# Patient Record
Sex: FEMALE | Race: WHITE | Employment: UNEMPLOYED | URBAN - METROPOLITAN AREA
[De-identification: names, ages, dates, MRNs, and addresses within clinical notes are randomized per-mention and may not be internally consistent; named-entity substitution may affect disease eponyms.]

---

## 2017-12-20 PROBLEM — K21.9 GASTROESOPHAGEAL REFLUX DISEASE WITHOUT ESOPHAGITIS: Status: ACTIVE | Noted: 2017-12-20

## 2017-12-20 PROBLEM — I10 ESSENTIAL HYPERTENSION: Status: ACTIVE | Noted: 2017-12-20

## 2017-12-20 PROBLEM — E11.9 CONTROLLED TYPE 2 DIABETES MELLITUS WITHOUT COMPLICATION, WITHOUT LONG-TERM CURRENT USE OF INSULIN (HCC): Status: ACTIVE | Noted: 2017-12-20

## 2017-12-20 PROBLEM — G37.3 TRANSVERSE MYELITIS (HCC): Status: ACTIVE | Noted: 2017-12-20

## 2018-02-21 PROBLEM — I49.8 SINUS ARRHYTHMIA SEEN ON ELECTROCARDIOGRAM: Status: ACTIVE | Noted: 2018-02-21

## 2018-02-21 PROBLEM — I89.0 LYMPHEDEMA: Status: ACTIVE | Noted: 2018-02-21

## 2018-09-20 PROBLEM — I48.20 CHRONIC ATRIAL FIBRILLATION (HCC): Status: ACTIVE | Noted: 2018-09-20

## 2018-12-14 ENCOUNTER — TELEPHONE (OUTPATIENT)
Dept: PRIMARY CARE CLINIC | Age: 63
End: 2018-12-14

## 2018-12-14 NOTE — TELEPHONE ENCOUNTER
Margy Rush received my message. See original notes taken for this call. This diagnosis was already listed in her problem list and didn't need routed to Dr. Jeff Goins. Margy Beverly then states that was not the reason for the call. Margy Rush reports that [employer] denying patient's Lymphedema pump and/or boot. This would now require a peer to peer. Otherwise a new script with dx: severe Lymphedema along with clinical notes/office note that Lymphedema evaluation, clear details in sections of note (objective and subjective) with findings-severe edema. This info and office note must be submitted in order for insurance to cover the DME. Called patient and notified, scheduled appt.

## 2018-12-14 NOTE — TELEPHONE ENCOUNTER
Left detailed message on secure voicemail for [place] with update and confirmation of patient diagnosis.

## 2018-12-31 LAB
A/G RATIO: NORMAL
ALBUMIN SERPL-MCNC: NORMAL G/DL
ALP BLD-CCNC: NORMAL U/L
ALT SERPL-CCNC: NORMAL U/L
ANION GAP SERPL CALCULATED.3IONS-SCNC: NORMAL MMOL/L
AST SERPL-CCNC: NORMAL U/L
AVERAGE GLUCOSE: NORMAL
BASOPHILS ABSOLUTE: NORMAL /ΜL
BASOPHILS RELATIVE PERCENT: NORMAL %
BILIRUB SERPL-MCNC: NORMAL MG/DL (ref 0.1–1.4)
BILIRUBIN DIRECT: NORMAL MG/DL
BILIRUBIN, INDIRECT: NORMAL
BUN BLDV-MCNC: NORMAL MG/DL
BUN/CREAT BLD: NORMAL
CALCIUM SERPL-MCNC: NORMAL MG/DL
CHLORIDE BLD-SCNC: NORMAL MMOL/L
CHOLESTEROL, FASTING: 148
CO2: NORMAL MMOL/L
CREAT SERPL-MCNC: NORMAL MG/DL
EOSINOPHILS ABSOLUTE: NORMAL /ΜL
EOSINOPHILS RELATIVE PERCENT: NORMAL %
GFR AFRICAN AMERICAN: NORMAL
GFR NON-AFRICAN AMERICAN: NORMAL
GLOBULIN: NORMAL
GLUCOSE FASTING: NORMAL MG/DL
HBA1C MFR BLD: 5.5 %
HCT VFR BLD CALC: NORMAL % (ref 36–46)
HDLC SERPL-MCNC: 45 MG/DL (ref 35–70)
HEMOGLOBIN: NORMAL G/DL (ref 12–16)
LDL CHOLESTEROL CALCULATED: 75 MG/DL (ref 0–160)
LYMPHOCYTES ABSOLUTE: NORMAL /ΜL
LYMPHOCYTES RELATIVE PERCENT: NORMAL %
MCH RBC QN AUTO: NORMAL PG
MCHC RBC AUTO-ENTMCNC: NORMAL G/DL
MCV RBC AUTO: NORMAL FL
MONOCYTES ABSOLUTE: NORMAL /ΜL
MONOCYTES RELATIVE PERCENT: NORMAL %
NEUTROPHILS ABSOLUTE: NORMAL /ΜL
NEUTROPHILS RELATIVE PERCENT: NORMAL %
PDW BLD-RTO: NORMAL %
PLATELET # BLD: NORMAL K/ΜL
PMV BLD AUTO: NORMAL FL
POTASSIUM SERPL-SCNC: NORMAL MMOL/L
PROTEIN TOTAL: NORMAL G/DL
RBC # BLD: NORMAL 10^6/ΜL
SODIUM BLD-SCNC: NORMAL MMOL/L
T4 FREE: NORMAL
TRIGLYCERIDE, FASTING: 139
TSH SERPL DL<=0.05 MIU/L-ACNC: NORMAL UIU/ML
WBC # BLD: NORMAL 10^3/ML

## 2019-01-02 DIAGNOSIS — E11.9 CONTROLLED TYPE 2 DIABETES MELLITUS WITHOUT COMPLICATION, WITHOUT LONG-TERM CURRENT USE OF INSULIN (HCC): ICD-10-CM

## 2019-01-03 DIAGNOSIS — R53.83 FATIGUE, UNSPECIFIED TYPE: ICD-10-CM

## 2019-01-03 DIAGNOSIS — E11.9 CONTROLLED TYPE 2 DIABETES MELLITUS WITHOUT COMPLICATION, WITHOUT LONG-TERM CURRENT USE OF INSULIN (HCC): ICD-10-CM

## 2019-01-03 PROCEDURE — 36415 COLL VENOUS BLD VENIPUNCTURE: CPT | Performed by: FAMILY MEDICINE

## 2019-01-07 ENCOUNTER — OFFICE VISIT (OUTPATIENT)
Dept: PRIMARY CARE CLINIC | Age: 64
End: 2019-01-07
Payer: COMMERCIAL

## 2019-01-07 VITALS — DIASTOLIC BLOOD PRESSURE: 76 MMHG | SYSTOLIC BLOOD PRESSURE: 126 MMHG | OXYGEN SATURATION: 95 % | HEART RATE: 62 BPM

## 2019-01-07 DIAGNOSIS — R10.2 PELVIC PAIN: ICD-10-CM

## 2019-01-07 DIAGNOSIS — I49.9 IRREGULAR HEART RATE: ICD-10-CM

## 2019-01-07 DIAGNOSIS — M54.50 ACUTE MIDLINE LOW BACK PAIN WITHOUT SCIATICA: Primary | ICD-10-CM

## 2019-01-07 DIAGNOSIS — I89.0 LYMPHEDEMA: ICD-10-CM

## 2019-01-07 PROCEDURE — G8421 BMI NOT CALCULATED: HCPCS | Performed by: FAMILY MEDICINE

## 2019-01-07 PROCEDURE — 1036F TOBACCO NON-USER: CPT | Performed by: FAMILY MEDICINE

## 2019-01-07 PROCEDURE — 99214 OFFICE O/P EST MOD 30 MIN: CPT | Performed by: FAMILY MEDICINE

## 2019-01-07 PROCEDURE — G8482 FLU IMMUNIZE ORDER/ADMIN: HCPCS | Performed by: FAMILY MEDICINE

## 2019-01-07 PROCEDURE — 93000 ELECTROCARDIOGRAM COMPLETE: CPT | Performed by: FAMILY MEDICINE

## 2019-01-07 PROCEDURE — G8427 DOCREV CUR MEDS BY ELIG CLIN: HCPCS | Performed by: FAMILY MEDICINE

## 2019-01-07 PROCEDURE — 3017F COLORECTAL CA SCREEN DOC REV: CPT | Performed by: FAMILY MEDICINE

## 2019-01-07 ASSESSMENT — ENCOUNTER SYMPTOMS
EYE DISCHARGE: 0
BACK PAIN: 1
RHINORRHEA: 0
SHORTNESS OF BREATH: 0
DIARRHEA: 0
COUGH: 0
SORE THROAT: 0
WHEEZING: 0
EYE REDNESS: 0
NAUSEA: 0
VOMITING: 0
ABDOMINAL PAIN: 0

## 2019-01-16 DIAGNOSIS — R10.2 PELVIC PAIN: ICD-10-CM

## 2019-01-18 DIAGNOSIS — R79.89 ABNORMAL THYROID BLOOD TEST: ICD-10-CM

## 2019-01-18 DIAGNOSIS — R53.83 FATIGUE, UNSPECIFIED TYPE: Primary | ICD-10-CM

## 2019-01-21 DIAGNOSIS — M54.50 ACUTE MIDLINE LOW BACK PAIN WITHOUT SCIATICA: ICD-10-CM

## 2019-01-28 DIAGNOSIS — K21.9 GASTROESOPHAGEAL REFLUX DISEASE WITHOUT ESOPHAGITIS: ICD-10-CM

## 2019-01-28 RX ORDER — DULOXETIN HYDROCHLORIDE 60 MG/1
CAPSULE, DELAYED RELEASE ORAL
Qty: 28 CAPSULE | Refills: 2 | Status: SHIPPED | OUTPATIENT
Start: 2019-01-28 | End: 2019-04-30 | Stop reason: SDUPTHER

## 2019-01-28 RX ORDER — PYRIDOSTIGMINE BROMIDE 60 MG/1
TABLET ORAL
Qty: 28 TABLET | Refills: 2 | Status: SHIPPED | OUTPATIENT
Start: 2019-01-28 | End: 2019-04-30 | Stop reason: SDUPTHER

## 2019-01-28 RX ORDER — FUROSEMIDE 40 MG/1
TABLET ORAL
Qty: 28 TABLET | Refills: 2 | Status: SHIPPED | OUTPATIENT
Start: 2019-01-28 | End: 2019-04-30 | Stop reason: SDUPTHER

## 2019-01-28 RX ORDER — OMEPRAZOLE 20 MG/1
CAPSULE, DELAYED RELEASE ORAL
Qty: 28 CAPSULE | Refills: 2 | Status: SHIPPED | OUTPATIENT
Start: 2019-01-28 | End: 2019-04-30 | Stop reason: SDUPTHER

## 2019-01-28 RX ORDER — RIVAROXABAN 20 MG/1
TABLET, FILM COATED ORAL
Qty: 28 TABLET | Refills: 2 | Status: SHIPPED | OUTPATIENT
Start: 2019-01-28 | End: 2019-04-30 | Stop reason: SDUPTHER

## 2019-01-28 RX ORDER — POTASSIUM CHLORIDE 750 MG/1
TABLET, FILM COATED, EXTENDED RELEASE ORAL
Qty: 28 TABLET | Refills: 2 | Status: SHIPPED | OUTPATIENT
Start: 2019-01-28 | End: 2019-04-30 | Stop reason: SDUPTHER

## 2019-01-28 RX ORDER — OXYBUTYNIN CHLORIDE 5 MG/1
TABLET ORAL
Qty: 56 TABLET | Refills: 2 | Status: SHIPPED | OUTPATIENT
Start: 2019-01-28 | End: 2019-04-30 | Stop reason: SDUPTHER

## 2019-02-25 DIAGNOSIS — I10 ESSENTIAL HYPERTENSION: ICD-10-CM

## 2019-02-25 DIAGNOSIS — E11.9 CONTROLLED TYPE 2 DIABETES MELLITUS WITHOUT COMPLICATION, WITHOUT LONG-TERM CURRENT USE OF INSULIN (HCC): ICD-10-CM

## 2019-02-27 RX ORDER — ATORVASTATIN CALCIUM 20 MG/1
TABLET, FILM COATED ORAL
Qty: 28 TABLET | Refills: 0 | Status: SHIPPED | OUTPATIENT
Start: 2019-02-27 | End: 2019-04-02 | Stop reason: SDUPTHER

## 2019-02-27 RX ORDER — LOSARTAN POTASSIUM 25 MG/1
TABLET ORAL
Qty: 28 TABLET | Refills: 0 | Status: SHIPPED | OUTPATIENT
Start: 2019-02-27 | End: 2019-04-02 | Stop reason: SDUPTHER

## 2019-03-01 ENCOUNTER — TELEPHONE (OUTPATIENT)
Dept: PRIMARY CARE CLINIC | Age: 64
End: 2019-03-01

## 2019-03-07 DIAGNOSIS — G37.3 TRANSVERSE MYELITIS (HCC): ICD-10-CM

## 2019-03-08 RX ORDER — OXYCODONE HYDROCHLORIDE AND ACETAMINOPHEN 5; 325 MG/1; MG/1
1 TABLET ORAL EVERY 6 HOURS PRN
Qty: 60 TABLET | Refills: 0 | Status: SHIPPED | OUTPATIENT
Start: 2019-03-08 | End: 2019-04-24 | Stop reason: SDUPTHER

## 2019-03-08 RX ORDER — POLYETHYLENE GLYCOL 3350 17 G/17G
17 POWDER, FOR SOLUTION ORAL DAILY
Qty: 1530 G | Refills: 1 | Status: SHIPPED | OUTPATIENT
Start: 2019-03-08 | End: 2019-04-07

## 2019-03-27 ENCOUNTER — OFFICE VISIT (OUTPATIENT)
Dept: PRIMARY CARE CLINIC | Age: 64
End: 2019-03-27
Payer: COMMERCIAL

## 2019-03-27 VITALS — OXYGEN SATURATION: 97 % | DIASTOLIC BLOOD PRESSURE: 86 MMHG | HEART RATE: 57 BPM | SYSTOLIC BLOOD PRESSURE: 132 MMHG

## 2019-03-27 DIAGNOSIS — I48.20 CHRONIC ATRIAL FIBRILLATION (HCC): ICD-10-CM

## 2019-03-27 DIAGNOSIS — I89.0 LYMPHEDEMA: ICD-10-CM

## 2019-03-27 DIAGNOSIS — Z12.31 SCREENING MAMMOGRAM, ENCOUNTER FOR: ICD-10-CM

## 2019-03-27 DIAGNOSIS — E11.9 CONTROLLED TYPE 2 DIABETES MELLITUS WITHOUT COMPLICATION, WITHOUT LONG-TERM CURRENT USE OF INSULIN (HCC): Primary | ICD-10-CM

## 2019-03-27 PROCEDURE — 99214 OFFICE O/P EST MOD 30 MIN: CPT | Performed by: FAMILY MEDICINE

## 2019-03-27 PROCEDURE — 1036F TOBACCO NON-USER: CPT | Performed by: FAMILY MEDICINE

## 2019-03-27 PROCEDURE — 2022F DILAT RTA XM EVC RTNOPTHY: CPT | Performed by: FAMILY MEDICINE

## 2019-03-27 PROCEDURE — G8421 BMI NOT CALCULATED: HCPCS | Performed by: FAMILY MEDICINE

## 2019-03-27 PROCEDURE — G8427 DOCREV CUR MEDS BY ELIG CLIN: HCPCS | Performed by: FAMILY MEDICINE

## 2019-03-27 PROCEDURE — G8482 FLU IMMUNIZE ORDER/ADMIN: HCPCS | Performed by: FAMILY MEDICINE

## 2019-03-27 PROCEDURE — 3017F COLORECTAL CA SCREEN DOC REV: CPT | Performed by: FAMILY MEDICINE

## 2019-03-27 PROCEDURE — 3046F HEMOGLOBIN A1C LEVEL >9.0%: CPT | Performed by: FAMILY MEDICINE

## 2019-03-27 ASSESSMENT — ENCOUNTER SYMPTOMS
VOMITING: 0
NAUSEA: 0
RHINORRHEA: 0
EYE DISCHARGE: 0
COUGH: 0
SORE THROAT: 0
EYE REDNESS: 0
ABDOMINAL PAIN: 0
WHEEZING: 0
DIARRHEA: 0
SHORTNESS OF BREATH: 0

## 2019-03-27 ASSESSMENT — PATIENT HEALTH QUESTIONNAIRE - PHQ9
SUM OF ALL RESPONSES TO PHQ QUESTIONS 1-9: 0
SUM OF ALL RESPONSES TO PHQ QUESTIONS 1-9: 0
SUM OF ALL RESPONSES TO PHQ9 QUESTIONS 1 & 2: 0
1. LITTLE INTEREST OR PLEASURE IN DOING THINGS: 0
2. FEELING DOWN, DEPRESSED OR HOPELESS: 0

## 2019-04-02 DIAGNOSIS — E11.9 CONTROLLED TYPE 2 DIABETES MELLITUS WITHOUT COMPLICATION, WITHOUT LONG-TERM CURRENT USE OF INSULIN (HCC): ICD-10-CM

## 2019-04-02 DIAGNOSIS — I10 ESSENTIAL HYPERTENSION: ICD-10-CM

## 2019-04-02 RX ORDER — ATORVASTATIN CALCIUM 20 MG/1
TABLET, FILM COATED ORAL
Qty: 28 TABLET | Refills: 0 | Status: SHIPPED | OUTPATIENT
Start: 2019-04-02 | End: 2019-04-30 | Stop reason: SDUPTHER

## 2019-04-02 RX ORDER — LOSARTAN POTASSIUM 25 MG/1
TABLET ORAL
Qty: 28 TABLET | Refills: 0 | Status: SHIPPED | OUTPATIENT
Start: 2019-04-02 | End: 2019-04-30 | Stop reason: SDUPTHER

## 2019-04-04 ENCOUNTER — HOSPITAL ENCOUNTER (OUTPATIENT)
Dept: MAMMOGRAPHY | Age: 64
Discharge: HOME | End: 2019-04-06
Payer: COMMERCIAL

## 2019-04-04 DIAGNOSIS — Z12.31 SCREENING MAMMOGRAM, ENCOUNTER FOR: ICD-10-CM

## 2019-04-04 PROCEDURE — 77063 BREAST TOMOSYNTHESIS BI: CPT

## 2019-04-24 ENCOUNTER — TELEPHONE (OUTPATIENT)
Dept: PRIMARY CARE CLINIC | Age: 64
End: 2019-04-24

## 2019-04-24 DIAGNOSIS — G37.3 TRANSVERSE MYELITIS (HCC): ICD-10-CM

## 2019-04-24 RX ORDER — OXYCODONE HYDROCHLORIDE AND ACETAMINOPHEN 5; 325 MG/1; MG/1
1 TABLET ORAL EVERY 6 HOURS PRN
Qty: 60 TABLET | Refills: 0 | Status: SHIPPED | OUTPATIENT
Start: 2019-04-24 | End: 2019-08-08 | Stop reason: SDUPTHER

## 2019-04-30 DIAGNOSIS — I10 ESSENTIAL HYPERTENSION: ICD-10-CM

## 2019-04-30 DIAGNOSIS — K21.9 GASTROESOPHAGEAL REFLUX DISEASE WITHOUT ESOPHAGITIS: ICD-10-CM

## 2019-04-30 DIAGNOSIS — E11.9 CONTROLLED TYPE 2 DIABETES MELLITUS WITHOUT COMPLICATION, WITHOUT LONG-TERM CURRENT USE OF INSULIN (HCC): ICD-10-CM

## 2019-04-30 RX ORDER — OXYBUTYNIN CHLORIDE 5 MG/1
TABLET ORAL
Qty: 56 TABLET | Refills: 0 | Status: SHIPPED | OUTPATIENT
Start: 2019-04-30 | End: 2019-06-28 | Stop reason: SDUPTHER

## 2019-04-30 RX ORDER — ATORVASTATIN CALCIUM 20 MG/1
TABLET, FILM COATED ORAL
Qty: 28 TABLET | Refills: 0 | Status: SHIPPED | OUTPATIENT
Start: 2019-04-30 | End: 2019-06-28 | Stop reason: SDUPTHER

## 2019-04-30 RX ORDER — LOSARTAN POTASSIUM 25 MG/1
TABLET ORAL
Qty: 28 TABLET | Refills: 0 | Status: SHIPPED | OUTPATIENT
Start: 2019-04-30 | End: 2019-06-28 | Stop reason: SDUPTHER

## 2019-04-30 RX ORDER — RIVAROXABAN 20 MG/1
TABLET, FILM COATED ORAL
Qty: 28 TABLET | Refills: 0 | Status: SHIPPED | OUTPATIENT
Start: 2019-04-30 | End: 2019-06-28 | Stop reason: SDUPTHER

## 2019-04-30 RX ORDER — OMEPRAZOLE 20 MG/1
CAPSULE, DELAYED RELEASE ORAL
Qty: 28 CAPSULE | Refills: 0 | Status: SHIPPED | OUTPATIENT
Start: 2019-04-30 | End: 2019-06-28 | Stop reason: SDUPTHER

## 2019-04-30 RX ORDER — POTASSIUM CHLORIDE 750 MG/1
TABLET, FILM COATED, EXTENDED RELEASE ORAL
Qty: 28 TABLET | Refills: 0 | Status: SHIPPED | OUTPATIENT
Start: 2019-04-30 | End: 2019-06-28 | Stop reason: SDUPTHER

## 2019-04-30 RX ORDER — DULOXETIN HYDROCHLORIDE 60 MG/1
CAPSULE, DELAYED RELEASE ORAL
Qty: 28 CAPSULE | Refills: 0 | Status: SHIPPED | OUTPATIENT
Start: 2019-04-30 | End: 2019-06-28 | Stop reason: SDUPTHER

## 2019-04-30 RX ORDER — FUROSEMIDE 40 MG/1
TABLET ORAL
Qty: 28 TABLET | Refills: 0 | Status: SHIPPED | OUTPATIENT
Start: 2019-04-30 | End: 2019-06-28 | Stop reason: SDUPTHER

## 2019-04-30 RX ORDER — PYRIDOSTIGMINE BROMIDE 60 MG/1
TABLET ORAL
Qty: 28 TABLET | Refills: 0 | Status: SHIPPED | OUTPATIENT
Start: 2019-04-30 | End: 2019-06-28 | Stop reason: SDUPTHER

## 2019-05-14 PROBLEM — G82.21 PARAPLEGIA, COMPLETE (HCC): Status: ACTIVE | Noted: 2019-05-14

## 2019-05-14 PROBLEM — N31.9 NEUROGENIC BLADDER: Status: ACTIVE | Noted: 2019-02-25

## 2019-05-14 PROBLEM — G82.21 PARAPLEGIA, COMPLETE (HCC): Status: ACTIVE | Noted: 2017-12-01

## 2019-06-04 ENCOUNTER — OFFICE VISIT (OUTPATIENT)
Dept: PRIMARY CARE CLINIC | Age: 64
End: 2019-06-04
Payer: COMMERCIAL

## 2019-06-04 VITALS — OXYGEN SATURATION: 93 % | DIASTOLIC BLOOD PRESSURE: 72 MMHG | SYSTOLIC BLOOD PRESSURE: 126 MMHG | HEART RATE: 70 BPM

## 2019-06-04 DIAGNOSIS — Z12.11 ENCOUNTER FOR SCREENING FOR MALIGNANT NEOPLASM OF COLON: ICD-10-CM

## 2019-06-04 DIAGNOSIS — G37.3 TRANSVERSE MYELITIS (HCC): Primary | ICD-10-CM

## 2019-06-04 DIAGNOSIS — K52.9 COLITIS: ICD-10-CM

## 2019-06-04 DIAGNOSIS — G82.21 PARAPLEGIA, COMPLETE (HCC): ICD-10-CM

## 2019-06-04 DIAGNOSIS — I48.20 CHRONIC ATRIAL FIBRILLATION (HCC): ICD-10-CM

## 2019-06-04 PROCEDURE — G8427 DOCREV CUR MEDS BY ELIG CLIN: HCPCS | Performed by: FAMILY MEDICINE

## 2019-06-04 PROCEDURE — 99214 OFFICE O/P EST MOD 30 MIN: CPT | Performed by: FAMILY MEDICINE

## 2019-06-04 PROCEDURE — 1036F TOBACCO NON-USER: CPT | Performed by: FAMILY MEDICINE

## 2019-06-04 PROCEDURE — G8421 BMI NOT CALCULATED: HCPCS | Performed by: FAMILY MEDICINE

## 2019-06-04 PROCEDURE — 3017F COLORECTAL CA SCREEN DOC REV: CPT | Performed by: FAMILY MEDICINE

## 2019-06-04 RX ORDER — AMOXICILLIN AND CLAVULANATE POTASSIUM 875; 125 MG/1; MG/1
1 TABLET, FILM COATED ORAL 2 TIMES DAILY
COMMUNITY
End: 2019-09-11

## 2019-06-04 RX ORDER — PROMETHAZINE HYDROCHLORIDE 25 MG/1
25 TABLET ORAL 4 TIMES DAILY PRN
Qty: 20 TABLET | Refills: 0 | Status: SHIPPED | OUTPATIENT
Start: 2019-06-04 | End: 2019-06-11

## 2019-06-04 ASSESSMENT — ENCOUNTER SYMPTOMS
NAUSEA: 0
DIARRHEA: 1
EYE REDNESS: 0
RHINORRHEA: 0
EYE DISCHARGE: 0
SHORTNESS OF BREATH: 0
ABDOMINAL PAIN: 0
VOMITING: 0
WHEEZING: 0
CONSTIPATION: 1
SORE THROAT: 0
COUGH: 0

## 2019-06-04 NOTE — PROGRESS NOTES
0    metoprolol tartrate (LOPRESSOR) 25 MG tablet TAKE (1) TABLET EVERY MORNING AND EVENING 56 tablet 0    omeprazole (PRILOSEC) 20 MG delayed release capsule TAKE (1) CAPSULE EVERY MORNING 28 capsule 0    XARELTO 20 MG TABS tablet TAKE (1) TABLET IN THE EVENING. 28 tablet 0    potassium chloride (KLOR-CON) 10 MEQ extended release tablet TAKE (1) TABLET EVERY MORNING 28 tablet 0    DULoxetine (CYMBALTA) 60 MG extended release capsule TAKE (1) CAPSULE EVERY MORNING 28 capsule 0    furosemide (LASIX) 40 MG tablet TAKE (1) TABLET EVERY MORNING 28 tablet 0    metFORMIN (GLUCOPHAGE) 500 MG tablet TAKE (2) TABLETS EVERY MORNING AND EVENING 112 tablet 0    cyclobenzaprine (FLEXERIL) 10 MG tablet Take 1 tablet by mouth 2 times daily 60 tablet 5    Multiple Vitamin (MULTIVITAMIN) tablet TAKE 1 TABLET ONCE DAILY 28 tablet 3    nystatin (MYCOSTATIN) 484913 UNIT/GM powder Apply 3 times daily. 45 g 3    Multiple Vitamins-Minerals (RA CENTRAL-HANNA SENIOR) TABS take 1 tablet by mouth once daily for PREVENTATIVE  0    potassium chloride (KLOR-CON M) 20 MEQ extended release tablet take 1 tablet by mouth twice a day WITH 10 MEQ TABLET TO EQUAL 30 MEQ 2 TIMES PER DAY  0    RA HEMORRHOIDAL 1-0.25-14.4-15 % CREA rectal cream apply TO RECTUM AREA once daily if needed for HEMMORROIDS  0    TRUEPLUS LANCETS 30G MISC use as directed TESTING ONCE A WEEK  0    hydrocortisone 2.5 % cream apply to RIGHT HAND AND ARM three times a day if needed for itching  0    TRUE METRIX BLOOD GLUCOSE TEST strip TEST ONCE A WEEK  0    RA ALLERGY 25 MG tablet take 1 tablet by mouth three times a day if needed for itching  0    Blood Glucose Monitoring Suppl (TRUE METRIX METER) w/Device KIT use as directed ONCE A WEEK  0    RA ACETAMINOPHEN 325 MG tablet take 1 tablet by mouth every 4 hours if needed for **MILD** PAIN/...  (REFER TO PRESCRIPTION NOTES). 0     No current facility-administered medications for this visit.       Allergies Allergen Reactions    Compazine [Prochlorperazine]      Cause neurological sx    Sulfa Antibiotics      Childhood allergy       Health Maintenance   Topic Date Due    Diabetic retinal exam  02/14/1967    HIV screen  02/14/1972    Shingles Vaccine (1 of 2) 02/14/2007    A1C test (Diabetic or Prediabetic)  12/31/2019    DTaP/Tdap/Td vaccine (3 - Td) 09/20/2028    Flu vaccine  Completed    Pneumococcal 0-64 years Vaccine  Completed       Subjective:      Review of Systems   Constitutional: Negative for chills and fever. HENT: Negative for rhinorrhea and sore throat. Eyes: Negative for discharge and redness. Respiratory: Negative for cough, shortness of breath and wheezing. Cardiovascular: Negative for chest pain and palpitations. Gastrointestinal: Positive for constipation and diarrhea. Negative for abdominal pain, nausea and vomiting. Genitourinary: Negative for dysuria and frequency. Musculoskeletal: Negative for arthralgias and myalgias. Neurological: Negative for dizziness, light-headedness and headaches. Psychiatric/Behavioral: Negative for sleep disturbance. Objective:     /72   Pulse 70   SpO2 93%   Physical Exam   Constitutional: She is oriented to person, place, and time. She appears well-developed and well-nourished. No distress. HENT:   Head: Normocephalic and atraumatic. Mouth/Throat: Oropharynx is clear and moist.   Eyes: Pupils are equal, round, and reactive to light. Conjunctivae are normal. Right eye exhibits no discharge. Left eye exhibits no discharge. No scleral icterus. Neck: No tracheal deviation present. No thyromegaly present. Cardiovascular: Normal rate and normal heart sounds. No carotid bruits  Irregular rate but controlled   Pulmonary/Chest: Effort normal and breath sounds normal. No respiratory distress. She has no wheezes. Musculoskeletal: She exhibits no edema. Lymphadenopathy:     She has no cervical adenopathy.    Neurological:

## 2019-06-28 DIAGNOSIS — E11.9 CONTROLLED TYPE 2 DIABETES MELLITUS WITHOUT COMPLICATION, WITHOUT LONG-TERM CURRENT USE OF INSULIN (HCC): ICD-10-CM

## 2019-06-28 DIAGNOSIS — I10 ESSENTIAL HYPERTENSION: ICD-10-CM

## 2019-06-28 DIAGNOSIS — K21.9 GASTROESOPHAGEAL REFLUX DISEASE WITHOUT ESOPHAGITIS: ICD-10-CM

## 2019-06-28 RX ORDER — FUROSEMIDE 40 MG/1
TABLET ORAL
Qty: 28 TABLET | Refills: 0 | Status: SHIPPED | OUTPATIENT
Start: 2019-06-28 | End: 2019-07-30 | Stop reason: SDUPTHER

## 2019-06-28 RX ORDER — POTASSIUM CHLORIDE 750 MG/1
TABLET, FILM COATED, EXTENDED RELEASE ORAL
Qty: 28 TABLET | Refills: 0 | Status: SHIPPED | OUTPATIENT
Start: 2019-06-28 | End: 2019-07-30 | Stop reason: SDUPTHER

## 2019-06-28 RX ORDER — DULOXETIN HYDROCHLORIDE 60 MG/1
CAPSULE, DELAYED RELEASE ORAL
Qty: 28 CAPSULE | Refills: 0 | Status: SHIPPED | OUTPATIENT
Start: 2019-06-28 | End: 2019-07-30 | Stop reason: SDUPTHER

## 2019-06-28 RX ORDER — PROMETHAZINE HYDROCHLORIDE 25 MG/1
TABLET ORAL
Qty: 20 TABLET | Refills: 0 | Status: SHIPPED | OUTPATIENT
Start: 2019-06-28 | End: 2019-07-31 | Stop reason: SDUPTHER

## 2019-06-28 RX ORDER — LOSARTAN POTASSIUM 25 MG/1
TABLET ORAL
Qty: 28 TABLET | Refills: 0 | Status: SHIPPED | OUTPATIENT
Start: 2019-06-28 | End: 2019-07-30 | Stop reason: SDUPTHER

## 2019-06-28 RX ORDER — ATORVASTATIN CALCIUM 20 MG/1
TABLET, FILM COATED ORAL
Qty: 28 TABLET | Refills: 0 | Status: SHIPPED | OUTPATIENT
Start: 2019-06-28 | End: 2019-07-30 | Stop reason: SDUPTHER

## 2019-06-28 RX ORDER — RIVAROXABAN 20 MG/1
TABLET, FILM COATED ORAL
Qty: 28 TABLET | Refills: 0 | Status: SHIPPED | OUTPATIENT
Start: 2019-06-28 | End: 2019-07-30 | Stop reason: SDUPTHER

## 2019-06-28 RX ORDER — OXYBUTYNIN CHLORIDE 5 MG/1
TABLET ORAL
Qty: 56 TABLET | Refills: 0 | Status: SHIPPED | OUTPATIENT
Start: 2019-06-28 | End: 2019-07-30 | Stop reason: SDUPTHER

## 2019-06-28 RX ORDER — OMEPRAZOLE 20 MG/1
CAPSULE, DELAYED RELEASE ORAL
Qty: 28 CAPSULE | Refills: 0 | Status: SHIPPED | OUTPATIENT
Start: 2019-06-28 | End: 2019-07-30 | Stop reason: SDUPTHER

## 2019-06-28 RX ORDER — PYRIDOSTIGMINE BROMIDE 60 MG/1
TABLET ORAL
Qty: 28 TABLET | Refills: 0 | Status: SHIPPED | OUTPATIENT
Start: 2019-06-28 | End: 2019-07-30 | Stop reason: SDUPTHER

## 2019-07-30 DIAGNOSIS — I10 ESSENTIAL HYPERTENSION: ICD-10-CM

## 2019-07-30 DIAGNOSIS — K21.9 GASTROESOPHAGEAL REFLUX DISEASE WITHOUT ESOPHAGITIS: ICD-10-CM

## 2019-07-30 DIAGNOSIS — E11.9 CONTROLLED TYPE 2 DIABETES MELLITUS WITHOUT COMPLICATION, WITHOUT LONG-TERM CURRENT USE OF INSULIN (HCC): ICD-10-CM

## 2019-07-31 RX ORDER — OXYBUTYNIN CHLORIDE 5 MG/1
TABLET ORAL
Qty: 56 TABLET | Refills: 0 | Status: SHIPPED | OUTPATIENT
Start: 2019-07-31 | End: 2019-09-09 | Stop reason: SDUPTHER

## 2019-07-31 RX ORDER — ATORVASTATIN CALCIUM 20 MG/1
TABLET, FILM COATED ORAL
Qty: 28 TABLET | Refills: 0 | Status: SHIPPED | OUTPATIENT
Start: 2019-07-31 | End: 2019-09-09 | Stop reason: SDUPTHER

## 2019-07-31 RX ORDER — LOSARTAN POTASSIUM 25 MG/1
TABLET ORAL
Qty: 28 TABLET | Refills: 0 | Status: SHIPPED | OUTPATIENT
Start: 2019-07-31 | End: 2019-09-09 | Stop reason: SDUPTHER

## 2019-07-31 RX ORDER — FUROSEMIDE 40 MG/1
TABLET ORAL
Qty: 28 TABLET | Refills: 0 | Status: SHIPPED | OUTPATIENT
Start: 2019-07-31 | End: 2019-09-09 | Stop reason: SDUPTHER

## 2019-07-31 RX ORDER — RIVAROXABAN 20 MG/1
TABLET, FILM COATED ORAL
Qty: 28 TABLET | Refills: 0 | Status: SHIPPED | OUTPATIENT
Start: 2019-07-31 | End: 2019-09-09 | Stop reason: SDUPTHER

## 2019-07-31 RX ORDER — OMEPRAZOLE 20 MG/1
CAPSULE, DELAYED RELEASE ORAL
Qty: 28 CAPSULE | Refills: 0 | Status: SHIPPED | OUTPATIENT
Start: 2019-07-31 | End: 2019-09-09 | Stop reason: SDUPTHER

## 2019-07-31 RX ORDER — POTASSIUM CHLORIDE 750 MG/1
TABLET, FILM COATED, EXTENDED RELEASE ORAL
Qty: 28 TABLET | Refills: 0 | Status: SHIPPED | OUTPATIENT
Start: 2019-07-31 | End: 2019-09-09 | Stop reason: SDUPTHER

## 2019-07-31 RX ORDER — PYRIDOSTIGMINE BROMIDE 60 MG/1
TABLET ORAL
Qty: 28 TABLET | Refills: 0 | Status: SHIPPED | OUTPATIENT
Start: 2019-07-31 | End: 2019-09-09 | Stop reason: SDUPTHER

## 2019-07-31 RX ORDER — DULOXETIN HYDROCHLORIDE 60 MG/1
CAPSULE, DELAYED RELEASE ORAL
Qty: 28 CAPSULE | Refills: 0 | Status: SHIPPED | OUTPATIENT
Start: 2019-07-31 | End: 2019-09-09 | Stop reason: SDUPTHER

## 2019-08-01 RX ORDER — PROMETHAZINE HYDROCHLORIDE 25 MG/1
TABLET ORAL
Qty: 20 TABLET | Refills: 0 | Status: SHIPPED | OUTPATIENT
Start: 2019-08-01 | End: 2019-09-09 | Stop reason: SDUPTHER

## 2019-08-08 DIAGNOSIS — G37.3 TRANSVERSE MYELITIS (HCC): ICD-10-CM

## 2019-08-08 RX ORDER — OXYCODONE HYDROCHLORIDE AND ACETAMINOPHEN 5; 325 MG/1; MG/1
1 TABLET ORAL EVERY 6 HOURS PRN
Qty: 60 TABLET | Refills: 0 | Status: SHIPPED | OUTPATIENT
Start: 2019-08-08 | End: 2019-10-30 | Stop reason: SDUPTHER

## 2019-08-26 ENCOUNTER — TELEPHONE (OUTPATIENT)
Dept: PRIMARY CARE CLINIC | Age: 64
End: 2019-08-26

## 2019-08-27 ENCOUNTER — TELEPHONE (OUTPATIENT)
Dept: PRIMARY CARE CLINIC | Age: 64
End: 2019-08-27

## 2019-08-27 DIAGNOSIS — G82.21 PARAPLEGIA, COMPLETE (HCC): Primary | ICD-10-CM

## 2019-08-28 NOTE — TELEPHONE ENCOUNTER
Ryan Christianson called back about this letter and lift DME. Letter written and faxed to [facility].

## 2019-09-03 ENCOUNTER — HOSPITAL ENCOUNTER (OUTPATIENT)
Age: 64
Setting detail: SPECIMEN
Discharge: HOME | End: 2019-09-03
Payer: COMMERCIAL

## 2019-09-03 LAB
ANION GAP SERPL CALCULATED.3IONS-SCNC: 13 MMOL/L (ref 9–17)
BUN BLDV-MCNC: 15 MG/DL (ref 8–23)
BUN/CREAT BLD: ABNORMAL (ref 9–20)
CALCIUM SERPL-MCNC: 8.9 MG/DL (ref 8.6–10.4)
CHLORIDE BLD-SCNC: 102 MMOL/L (ref 98–107)
CO2: 24 MMOL/L (ref 20–31)
CREAT SERPL-MCNC: 0.47 MG/DL (ref 0.5–0.9)
GFR AFRICAN AMERICAN: >60 ML/MIN
GFR NON-AFRICAN AMERICAN: >60 ML/MIN
GFR SERPL CREATININE-BSD FRML MDRD: ABNORMAL ML/MIN/{1.73_M2}
GFR SERPL CREATININE-BSD FRML MDRD: ABNORMAL ML/MIN/{1.73_M2}
GLUCOSE BLD-MCNC: 69 MG/DL (ref 70–99)
HCT VFR BLD CALC: 37.3 % (ref 36.3–47.1)
HEMOGLOBIN: 11.7 G/DL (ref 11.9–15.1)
MCH RBC QN AUTO: 29.3 PG (ref 25.2–33.5)
MCHC RBC AUTO-ENTMCNC: 31.4 G/DL (ref 28.4–34.8)
MCV RBC AUTO: 93.3 FL (ref 82.6–102.9)
NRBC AUTOMATED: 0 PER 100 WBC
PDW BLD-RTO: 14.3 % (ref 11.8–14.4)
PLATELET # BLD: 211 K/UL (ref 138–453)
PMV BLD AUTO: 12 FL (ref 8.1–13.5)
POTASSIUM SERPL-SCNC: 3.5 MMOL/L (ref 3.7–5.3)
RBC # BLD: 4 M/UL (ref 3.95–5.11)
SODIUM BLD-SCNC: 139 MMOL/L (ref 135–144)
WBC # BLD: 9.5 K/UL (ref 3.5–11.3)

## 2019-09-03 PROCEDURE — 80048 BASIC METABOLIC PNL TOTAL CA: CPT

## 2019-09-03 PROCEDURE — 85027 COMPLETE CBC AUTOMATED: CPT

## 2019-09-03 PROCEDURE — P9603 ONE-WAY ALLOW PRORATED MILES: HCPCS

## 2019-09-03 PROCEDURE — 36415 COLL VENOUS BLD VENIPUNCTURE: CPT

## 2019-09-05 ENCOUNTER — TELEPHONE (OUTPATIENT)
Dept: PRIMARY CARE CLINIC | Age: 64
End: 2019-09-05

## 2019-09-09 DIAGNOSIS — E11.9 CONTROLLED TYPE 2 DIABETES MELLITUS WITHOUT COMPLICATION, WITHOUT LONG-TERM CURRENT USE OF INSULIN (HCC): ICD-10-CM

## 2019-09-09 DIAGNOSIS — I10 ESSENTIAL HYPERTENSION: ICD-10-CM

## 2019-09-09 DIAGNOSIS — K21.9 GASTROESOPHAGEAL REFLUX DISEASE WITHOUT ESOPHAGITIS: ICD-10-CM

## 2019-09-09 RX ORDER — PROMETHAZINE HYDROCHLORIDE 25 MG/1
TABLET ORAL
Qty: 20 TABLET | Refills: 0 | Status: SHIPPED | OUTPATIENT
Start: 2019-09-09 | End: 2019-10-09 | Stop reason: SDUPTHER

## 2019-09-09 RX ORDER — LOSARTAN POTASSIUM 25 MG/1
TABLET ORAL
Qty: 30 TABLET | Refills: 3 | Status: SHIPPED | OUTPATIENT
Start: 2019-09-09 | End: 2020-04-23

## 2019-09-09 RX ORDER — RIVAROXABAN 20 MG/1
TABLET, FILM COATED ORAL
Qty: 30 TABLET | Refills: 3 | Status: SHIPPED | OUTPATIENT
Start: 2019-09-09 | End: 2020-04-23

## 2019-09-09 RX ORDER — OXYBUTYNIN CHLORIDE 5 MG/1
TABLET ORAL
Qty: 60 TABLET | Refills: 3 | Status: SHIPPED | OUTPATIENT
Start: 2019-09-09 | End: 2020-04-23

## 2019-09-09 RX ORDER — DULOXETIN HYDROCHLORIDE 60 MG/1
CAPSULE, DELAYED RELEASE ORAL
Qty: 30 CAPSULE | Refills: 3 | Status: SHIPPED | OUTPATIENT
Start: 2019-09-09 | End: 2020-04-23

## 2019-09-09 RX ORDER — FUROSEMIDE 40 MG/1
TABLET ORAL
Qty: 30 TABLET | Refills: 3 | Status: SHIPPED | OUTPATIENT
Start: 2019-09-09 | End: 2020-04-23

## 2019-09-09 RX ORDER — POTASSIUM CHLORIDE 750 MG/1
TABLET, FILM COATED, EXTENDED RELEASE ORAL
Qty: 30 TABLET | Refills: 3 | Status: SHIPPED | OUTPATIENT
Start: 2019-09-09 | End: 2020-04-23

## 2019-09-09 RX ORDER — PYRIDOSTIGMINE BROMIDE 60 MG/1
TABLET ORAL
Qty: 30 TABLET | Refills: 3 | Status: SHIPPED | OUTPATIENT
Start: 2019-09-09 | End: 2020-04-23

## 2019-09-09 RX ORDER — OMEPRAZOLE 20 MG/1
CAPSULE, DELAYED RELEASE ORAL
Qty: 28 CAPSULE | Refills: 0 | Status: SHIPPED | OUTPATIENT
Start: 2019-09-09 | End: 2019-10-09 | Stop reason: SDUPTHER

## 2019-09-09 RX ORDER — ATORVASTATIN CALCIUM 20 MG/1
TABLET, FILM COATED ORAL
Qty: 30 TABLET | Refills: 3 | Status: SHIPPED | OUTPATIENT
Start: 2019-09-09 | End: 2020-04-23

## 2019-09-11 ENCOUNTER — OFFICE VISIT (OUTPATIENT)
Dept: PRIMARY CARE CLINIC | Age: 64
End: 2019-09-11
Payer: COMMERCIAL

## 2019-09-11 VITALS — HEART RATE: 72 BPM | OXYGEN SATURATION: 97 % | SYSTOLIC BLOOD PRESSURE: 138 MMHG | DIASTOLIC BLOOD PRESSURE: 88 MMHG

## 2019-09-11 DIAGNOSIS — Z23 NEED FOR VIRAL IMMUNIZATION: ICD-10-CM

## 2019-09-11 DIAGNOSIS — E11.9 CONTROLLED TYPE 2 DIABETES MELLITUS WITHOUT COMPLICATION, WITHOUT LONG-TERM CURRENT USE OF INSULIN (HCC): Primary | ICD-10-CM

## 2019-09-11 LAB — HBA1C MFR BLD: 5.3 %

## 2019-09-11 PROCEDURE — 99213 OFFICE O/P EST LOW 20 MIN: CPT | Performed by: FAMILY MEDICINE

## 2019-09-11 PROCEDURE — 3017F COLORECTAL CA SCREEN DOC REV: CPT | Performed by: FAMILY MEDICINE

## 2019-09-11 PROCEDURE — 83036 HEMOGLOBIN GLYCOSYLATED A1C: CPT | Performed by: FAMILY MEDICINE

## 2019-09-11 PROCEDURE — G0008 ADMIN INFLUENZA VIRUS VAC: HCPCS | Performed by: FAMILY MEDICINE

## 2019-09-11 PROCEDURE — 90686 IIV4 VACC NO PRSV 0.5 ML IM: CPT | Performed by: FAMILY MEDICINE

## 2019-09-11 PROCEDURE — 1036F TOBACCO NON-USER: CPT | Performed by: FAMILY MEDICINE

## 2019-09-11 PROCEDURE — 3044F HG A1C LEVEL LT 7.0%: CPT | Performed by: FAMILY MEDICINE

## 2019-09-11 PROCEDURE — G8427 DOCREV CUR MEDS BY ELIG CLIN: HCPCS | Performed by: FAMILY MEDICINE

## 2019-09-11 PROCEDURE — 2022F DILAT RTA XM EVC RTNOPTHY: CPT | Performed by: FAMILY MEDICINE

## 2019-09-11 PROCEDURE — G8421 BMI NOT CALCULATED: HCPCS | Performed by: FAMILY MEDICINE

## 2019-09-11 RX ORDER — AMOXICILLIN AND CLAVULANATE POTASSIUM 875; 125 MG/1; MG/1
1 TABLET, FILM COATED ORAL 2 TIMES DAILY
Qty: 14 TABLET | Refills: 0 | Status: SHIPPED | OUTPATIENT
Start: 2019-09-11 | End: 2019-09-18

## 2019-09-11 ASSESSMENT — ENCOUNTER SYMPTOMS
DIARRHEA: 0
SHORTNESS OF BREATH: 0
SORE THROAT: 0
RHINORRHEA: 0
COUGH: 0
EYE DISCHARGE: 0
WHEEZING: 0
ABDOMINAL PAIN: 0
NAUSEA: 0
EYE REDNESS: 0
VOMITING: 0

## 2019-09-11 NOTE — PROGRESS NOTES
[facility] PRIMARY CARE  [address] [address] [facility] [address]  Dept: [phone]    Gaurav Wesley is a 60 y.o. female who presents today for her medical conditions/complaintsas noted below. Chief Complaint   Patient presents with    Diabetes     Follow up       HPI:     HPI  Patient here for recheck. Patient did have a respite care for 1 week by her stand-up lift was repaired. Denies any chest pain. No low blood sugar reactions. States having odor and some blood in the urine requesting Augmentin antibiotic. Denies any fevers or chills. No change in mental status. LDL Calculated (mg/dL)   Date Value   12/31/2018 75   02/09/2018 135       (goal LDL is <100)   BUN (mg/dL)   Date Value   09/03/2019 15     BP Readings from Last 3 Encounters:   09/11/19 138/88   06/04/19 126/72   03/27/19 132/86          (goal 120/80)    No past medical history on file. Past Surgical History:   Procedure Laterality Date    APPENDECTOMY  80    CHOLECYSTECTOMY, OPEN  1984    HYSTERECTOMY, TOTAL ABDOMINAL  1988       No family history on file. Social History     Tobacco Use    Smoking status: Never Smoker    Smokeless tobacco: Never Used   Substance Use Topics    Alcohol use: No      Current Outpatient Medications   Medication Sig Dispense Refill    metFORMIN (GLUCOPHAGE) 500 MG tablet TAKE (2) TABLETS EVERY MORNING AND ONE TABLET IN THE EVENING 270 tablet 3    amoxicillin-clavulanate (AUGMENTIN) 875-125 MG per tablet Take 1 tablet by mouth 2 times daily for 7 days 14 tablet 0    potassium chloride (KLOR-CON) 10 MEQ extended release tablet TAKE (1) TABLET EVERY MORNING 30 tablet 3    promethazine (PHENERGAN) 25 MG tablet TAKE (1) TABLET FOUR TIMES A DAY AS NEEDED FOR NAUSEA 20 tablet 0    atorvastatin (LIPITOR) 20 MG tablet TAKE (1) TABLET IN THE EVENING.  30 tablet 3    losartan (COZAAR) 25 MG tablet TAKE (1) TABLET EVERY MORNING 30 tablet 3    metoprolol person, place, and time. Flaccid on the right   Skin: Skin is warm. No rash noted. Psychiatric: She has a normal mood and affect. Her behavior is normal. Thought content normal.   Nursing note and vitals reviewed. Assessment:       Diagnosis Orders   1. Controlled type 2 diabetes mellitus without complication, without long-term current use of insulin (HCC)  [place] COLLECTION CAPILLARY BLOOD SPECIMEN    POCT glycosylated hemoglobin (Hb A1C)   2. Need for viral immunization  INFLUENZA, QUADV, 0.5ML, 6 MO AND OLDER, IM, PF, PREFILL SYR OR SDV (FLUZONE QUADV, PF)        Plan:    Augmentin for 7 days. Decrease metformin to 2 tablets in a.m. 1 in p.m. Flu shot today. Continue other medications as is    Return in about 3 months (around 12/11/2019), or medicare wellness. Orders Placed This Encounter   Procedures    INFLUENZA, QUADV, 0.5ML, 6 MO AND OLDER, IM, PF, PREFILL SYR OR SDV (FLUZONE QUADV, PF)    POCT glycosylated hemoglobin (Hb A1C)    [place] COLLECTION CAPILLARY BLOOD SPECIMEN     Orders Placed This Encounter   Medications    metFORMIN (GLUCOPHAGE) 500 MG tablet     Sig: TAKE (2) TABLETS EVERY MORNING AND ONE TABLET IN THE EVENING     Dispense:  270 tablet     Refill:  3    amoxicillin-clavulanate (AUGMENTIN) 875-125 MG per tablet     Sig: Take 1 tablet by mouth 2 times daily for 7 days     Dispense:  14 tablet     Refill:  0       Patient given educationalmaterials - see patient instructions. Discussed use, benefit, and side effectsof prescribed medications. All patient questions answered. Pt voiced understanding. Reviewed health maintenance. Instructed to continue current medications, diet andexercise. Patient agreed with treatment plan. Follow up as directed.      Electronicallysigned by Dedra Daniels MD on 9/11/2019 at 11:35 AM

## 2019-09-11 NOTE — PATIENT INSTRUCTIONS
pneumococcal vaccine (PCV13) and/or DTaP vaccine at the same time might be slightly more likely to have a seizure caused by fever. Ask your doctor for more information. Tell your doctor if a child who is getting flu vaccine has ever had a seizure  Problems that could happen after any injected vaccine:  · People sometimes faint after a medical procedure, including vaccination. Sitting or lying down for about 15 minutes can help prevent fainting, and injuries caused by a fall. Tell your doctor if you feel dizzy, or have vision changes or ringing in the ears. · Some people get severe pain in the shoulder and have difficulty moving the arm where a shot was given. This happens very rarely. · Any medication can cause a severe allergic reaction. Such reactions from a vaccine are very rare, estimated at about 1 in a million doses, and would happen within a few minutes to a few hours after the vaccination. As with any medicine, there is a very remote chance of a vaccine causing a serious injury or death. The safety of vaccines is always being monitored. For more information, visit: www.cdc.gov/vaccinesafety/. What if there is a serious reaction? What should I look for? · Look for anything that concerns you, such as signs of a severe allergic reaction, very high fever, or unusual behavior. Signs of a severe allergic reaction can include hives, swelling of the face and throat, difficulty breathing, a fast heartbeat, dizziness, and weakness - usually within a few minutes to a few hours after the vaccination. What should I do? · If you think it is a severe allergic reaction or other emergency that can't wait, call 9-1-1 and get the person to the nearest hospital. Otherwise, call your doctor. · Reactions should be reported to the \"Vaccine Adverse Event Reporting System\" (VAERS). Your doctor should file this report, or you can do it yourself through the VAERS website at www.vaers. [email or web address].gov, or by calling

## 2019-10-09 DIAGNOSIS — K21.9 GASTROESOPHAGEAL REFLUX DISEASE WITHOUT ESOPHAGITIS: ICD-10-CM

## 2019-10-09 RX ORDER — CYCLOBENZAPRINE HCL 10 MG
TABLET ORAL
Qty: 60 TABLET | Refills: 0 | Status: SHIPPED | OUTPATIENT
Start: 2019-10-09 | End: 2019-10-30 | Stop reason: SDUPTHER

## 2019-10-09 RX ORDER — PROMETHAZINE HYDROCHLORIDE 25 MG/1
TABLET ORAL
Qty: 20 TABLET | Refills: 0 | Status: SHIPPED | OUTPATIENT
Start: 2019-10-09 | End: 2019-11-21 | Stop reason: SDUPTHER

## 2019-10-09 RX ORDER — OMEPRAZOLE 20 MG/1
CAPSULE, DELAYED RELEASE ORAL
Qty: 28 CAPSULE | Refills: 0 | Status: SHIPPED | OUTPATIENT
Start: 2019-10-09 | End: 2019-11-21 | Stop reason: SDUPTHER

## 2019-10-30 DIAGNOSIS — G37.3 TRANSVERSE MYELITIS (HCC): ICD-10-CM

## 2019-10-30 RX ORDER — CYCLOBENZAPRINE HCL 10 MG
10 TABLET ORAL 2 TIMES DAILY
Qty: 60 TABLET | Refills: 5 | Status: SHIPPED | OUTPATIENT
Start: 2019-10-30 | End: 2020-09-09

## 2019-10-30 RX ORDER — OXYCODONE HYDROCHLORIDE AND ACETAMINOPHEN 5; 325 MG/1; MG/1
1 TABLET ORAL EVERY 6 HOURS PRN
Qty: 60 TABLET | Refills: 0 | Status: SHIPPED | OUTPATIENT
Start: 2019-10-30 | End: 2020-01-14 | Stop reason: SDUPTHER

## 2019-11-19 ENCOUNTER — TELEPHONE (OUTPATIENT)
Dept: PRIMARY CARE CLINIC | Age: 64
End: 2019-11-19

## 2019-11-21 DIAGNOSIS — K21.9 GASTROESOPHAGEAL REFLUX DISEASE WITHOUT ESOPHAGITIS: ICD-10-CM

## 2019-11-22 RX ORDER — PROMETHAZINE HYDROCHLORIDE 25 MG/1
TABLET ORAL
Qty: 20 TABLET | Refills: 0 | Status: SHIPPED | OUTPATIENT
Start: 2019-11-22 | End: 2020-04-02

## 2019-11-22 RX ORDER — OMEPRAZOLE 20 MG/1
CAPSULE, DELAYED RELEASE ORAL
Qty: 28 CAPSULE | Refills: 2 | Status: SHIPPED | OUTPATIENT
Start: 2019-11-22 | End: 2020-05-19

## 2020-01-13 ENCOUNTER — TELEPHONE (OUTPATIENT)
Dept: PRIMARY CARE CLINIC | Age: 65
End: 2020-01-13

## 2020-01-13 NOTE — TELEPHONE ENCOUNTER
Last filled 10/30/19  Last OV 9/11/19  [facility] listed. Pt has appt on the 29th of Jan with you.

## 2020-01-13 NOTE — TELEPHONE ENCOUNTER
According to chart, patient has an allergy to Sulfa and thus Bactrim has sulfa in it. Recommend get urine sample and have it tested.  Would she like Macrobid or Augmentin sent in?

## 2020-01-13 NOTE — TELEPHONE ENCOUNTER
Pt c/o urinary freq, strong odor times 1 week. Asking if Bactrim can be sent in to the [address] [facility] listed?

## 2020-01-14 RX ORDER — OXYCODONE HYDROCHLORIDE AND ACETAMINOPHEN 5; 325 MG/1; MG/1
1 TABLET ORAL EVERY 6 HOURS PRN
Qty: 60 TABLET | Refills: 0 | Status: SHIPPED | OUTPATIENT
Start: 2020-01-14 | End: 2020-01-29

## 2020-01-14 RX ORDER — AMOXICILLIN AND CLAVULANATE POTASSIUM 875; 125 MG/1; MG/1
1 TABLET, FILM COATED ORAL 2 TIMES DAILY
Qty: 14 TABLET | Refills: 0 | Status: SHIPPED | OUTPATIENT
Start: 2020-01-14 | End: 2020-01-21

## 2020-01-14 NOTE — TELEPHONE ENCOUNTER
Pt in wheelchair, and would need to call for transportation to come in for sample. She states she doesn't have a home care company. She states she usually uses Augmentin  [facility] [address].

## 2020-01-29 ENCOUNTER — OFFICE VISIT (OUTPATIENT)
Dept: PRIMARY CARE CLINIC | Age: 65
End: 2020-01-29
Payer: COMMERCIAL

## 2020-01-29 VITALS — DIASTOLIC BLOOD PRESSURE: 84 MMHG | SYSTOLIC BLOOD PRESSURE: 134 MMHG | HEART RATE: 75 BPM | OXYGEN SATURATION: 97 %

## 2020-01-29 LAB — HBA1C MFR BLD: 5.5 %

## 2020-01-29 PROCEDURE — 3044F HG A1C LEVEL LT 7.0%: CPT | Performed by: FAMILY MEDICINE

## 2020-01-29 PROCEDURE — 83036 HEMOGLOBIN GLYCOSYLATED A1C: CPT | Performed by: FAMILY MEDICINE

## 2020-01-29 PROCEDURE — G0439 PPPS, SUBSEQ VISIT: HCPCS | Performed by: FAMILY MEDICINE

## 2020-01-29 PROCEDURE — G8482 FLU IMMUNIZE ORDER/ADMIN: HCPCS | Performed by: FAMILY MEDICINE

## 2020-01-29 PROCEDURE — 3017F COLORECTAL CA SCREEN DOC REV: CPT | Performed by: FAMILY MEDICINE

## 2020-01-29 RX ORDER — NYSTATIN 100000 [USP'U]/G
POWDER TOPICAL 3 TIMES DAILY
Qty: 60 G | Refills: 3 | Status: SHIPPED | OUTPATIENT
Start: 2020-01-29 | End: 2020-05-19

## 2020-01-29 RX ORDER — OXYCODONE HYDROCHLORIDE AND ACETAMINOPHEN 5; 325 MG/1; MG/1
1 TABLET ORAL DAILY PRN
Qty: 30 TABLET | Refills: 0
Start: 2020-01-29 | End: 2020-02-28 | Stop reason: SDUPTHER

## 2020-01-29 ASSESSMENT — PATIENT HEALTH QUESTIONNAIRE - PHQ9
SUM OF ALL RESPONSES TO PHQ QUESTIONS 1-9: 2
SUM OF ALL RESPONSES TO PHQ QUESTIONS 1-9: 2

## 2020-01-29 NOTE — PATIENT INSTRUCTIONS
Personalized Preventive Plan for Jemal Screws - 1/29/2020  Medicare offers a range of preventive health benefits. Some of the tests and screenings are paid in full while other may be subject to a deductible, co-insurance, and/or copay. Some of these benefits include a comprehensive review of your medical history including lifestyle, illnesses that may run in your family, and various assessments and screenings as appropriate. After reviewing your medical record and screening and assessments performed today your provider may have ordered immunizations, labs, imaging, and/or referrals for you. A list of these orders (if applicable) as well as your Preventive Care list are included within your After Visit Summary for your review. Other Preventive Recommendations:    · A preventive eye exam performed by an eye specialist is recommended every 1-2 years to screen for glaucoma; cataracts, macular degeneration, and other eye disorders. · A preventive dental visit is recommended every 6 months. · Try to get at least 150 minutes of exercise per week or 10,000 steps per day on a pedometer . · Order or download the FREE \"Exercise & Physical Activity: Your Everyday Guide\" from [employer]. Call 9-664.412.6694 or search [employer] online. · You need 4809-2869 mg of calcium and 4762-5175 IU of vitamin D per day. It is possible to meet your calcium requirement with diet alone, but a vitamin D supplement is usually necessary to meet this goal.  · When exposed to the sun, use a sunscreen that protects against both UVA and UVB radiation with an SPF of 30 or greater. Reapply every 2 to 3 hours or after sweating, drying off with a towel, or swimming. · Always wear a seat belt when traveling in a car. Always wear a helmet when riding a bicycle or motorcycle.

## 2020-01-29 NOTE — PROGRESS NOTES
providers/suppliers regularly involved in providing care):   Patient Care Team:  Bebeto Ferguson MD as PCP - General (Family Medicine)  Bebeto Ferguson MD as PCP - [facility] Provider    Wt Readings from Last 3 Encounters:   No data found for Wt     Vitals:    01/29/20 1113   BP: 134/84   Pulse: 75   SpO2: 97%     There is no height or weight on file to calculate BMI. Based upon direct observation of the patient, evaluation of cognition reveals recent and remote memory intact. General Appearance: alert and oriented to person, place and time, well developed and well- nourished, in no acute distress  Skin: warm and dry, no rash or erythema  Head: normocephalic and atraumatic  Eyes: pupils equal, round, and reactive to light, extraocular eye movements intact, conjunctivae normal  ENT: tympanic membrane, external ear and ear canal normal bilaterally, nose without deformity, nasal mucosa and turbinates normal without polyps  Neck: supple and non-tender without mass, no thyromegaly or thyroid nodules, no cervical lymphadenopathy  Pulmonary/Chest: clear to auscultation bilaterally- no wheezes, rales or rhonchi, normal air movement, no respiratory distress  Cardiovascular: normal rate, regular rhythm, normal S1 and S2, no murmurs, rubs, clicks, or gallops, distal pulses intact, no carotid bruits  Abdomen: soft, non-tender, non-distended, normal bowel sounds, no masses or organomegaly  Extremities: no cyanosis, clubbing or edema  Musculoskeletal: normal range of motion, no joint swelling, deformity or tenderness  Neurologic: reflexes normal and symmetric, no cranial nerve deficit, gait, coordination and speech normal    Patient's complete Health Risk Assessment and screening values have been reviewed and are found in Flowsheets. The following problems were reviewed today and where indicated follow up appointments were made and/or referrals ordered.     Positive Risk Factor Screenings with Interventions:

## 2020-02-24 LAB
ANION GAP SERPL CALCULATED.3IONS-SCNC: NORMAL MMOL/L
BUN BLDV-MCNC: NORMAL MG/DL
BUN/CREAT BLD: NORMAL
CALCIUM SERPL-MCNC: NORMAL MG/DL
CHLORIDE BLD-SCNC: NORMAL MMOL/L
CHOLESTEROL, FASTING: 184
CO2: NORMAL
CREAT SERPL-MCNC: NORMAL MG/DL
GFR AFRICAN AMERICAN: NORMAL
GFR NON-AFRICAN AMERICAN: NORMAL
GLUCOSE FASTING: 83 MG/DL
HDLC SERPL-MCNC: 50 MG/DL (ref 35–70)
LDL CHOLESTEROL CALCULATED: 117 MG/DL (ref 0–160)
POTASSIUM SERPL-SCNC: NORMAL MMOL/L
SODIUM BLD-SCNC: NORMAL MMOL/L
T4 FREE: NORMAL
TRIGLYCERIDE, FASTING: 87
TSH SERPL DL<=0.05 MIU/L-ACNC: NORMAL M[IU]/L

## 2020-02-28 RX ORDER — OXYCODONE HYDROCHLORIDE AND ACETAMINOPHEN 5; 325 MG/1; MG/1
1 TABLET ORAL DAILY PRN
Qty: 30 TABLET | Refills: 0 | Status: SHIPPED | OUTPATIENT
Start: 2020-02-28 | End: 2020-03-27 | Stop reason: SDUPTHER

## 2020-03-20 ENCOUNTER — TELEPHONE (OUTPATIENT)
Dept: PRIMARY CARE CLINIC | Age: 65
End: 2020-03-20

## 2020-03-20 NOTE — TELEPHONE ENCOUNTER
Krystyna with [facility] calling and states she did a re-cert today. She states that there is no medication changes or plan of care changes. She would like to know if you are still going to sign orders. Please advise.

## 2020-03-27 RX ORDER — OXYCODONE HYDROCHLORIDE AND ACETAMINOPHEN 5; 325 MG/1; MG/1
1 TABLET ORAL DAILY PRN
Qty: 30 TABLET | Refills: 0 | Status: SHIPPED | OUTPATIENT
Start: 2020-03-27 | End: 2020-04-21 | Stop reason: SDUPTHER

## 2020-04-02 RX ORDER — PROMETHAZINE HYDROCHLORIDE 25 MG/1
TABLET ORAL
Qty: 20 TABLET | Refills: 0 | Status: SHIPPED | OUTPATIENT
Start: 2020-04-02

## 2020-04-21 RX ORDER — OXYCODONE HYDROCHLORIDE AND ACETAMINOPHEN 5; 325 MG/1; MG/1
1 TABLET ORAL DAILY PRN
Qty: 30 TABLET | Refills: 0 | Status: SHIPPED | OUTPATIENT
Start: 2020-04-21 | End: 2020-05-26

## 2020-04-22 ENCOUNTER — TELEPHONE (OUTPATIENT)
Dept: PRIMARY CARE CLINIC | Age: 65
End: 2020-04-22

## 2020-04-22 NOTE — TELEPHONE ENCOUNTER
Pt called stating her percocet 5-325mg was sent in yesterday 4/21/20 and pharmacy advised her it was to early to fill, fill day wouldn't be until next Thursday 4/27/20, she is asking if you could change the date?   (I was not sure how to go about this encounter, either a refill request again or telephone message)         Please advise       Please call pt to notify

## 2020-04-23 RX ORDER — FUROSEMIDE 40 MG/1
TABLET ORAL
Qty: 28 TABLET | Refills: 0 | Status: SHIPPED | OUTPATIENT
Start: 2020-04-23 | End: 2020-05-19

## 2020-04-23 RX ORDER — LOSARTAN POTASSIUM 25 MG/1
TABLET ORAL
Qty: 28 TABLET | Refills: 0 | Status: SHIPPED | OUTPATIENT
Start: 2020-04-23 | End: 2020-05-19

## 2020-04-23 RX ORDER — DULOXETIN HYDROCHLORIDE 60 MG/1
CAPSULE, DELAYED RELEASE ORAL
Qty: 28 CAPSULE | Refills: 0 | Status: SHIPPED | OUTPATIENT
Start: 2020-04-23 | End: 2020-05-19

## 2020-04-23 RX ORDER — ATORVASTATIN CALCIUM 20 MG/1
TABLET, FILM COATED ORAL
Qty: 28 TABLET | Refills: 0 | Status: SHIPPED | OUTPATIENT
Start: 2020-04-23 | End: 2020-05-19

## 2020-04-23 RX ORDER — RIVAROXABAN 20 MG/1
TABLET, FILM COATED ORAL
Qty: 28 TABLET | Refills: 0 | Status: SHIPPED | OUTPATIENT
Start: 2020-04-23 | End: 2020-05-19

## 2020-04-23 RX ORDER — POTASSIUM CHLORIDE 750 MG/1
TABLET, FILM COATED, EXTENDED RELEASE ORAL
Qty: 28 TABLET | Refills: 0 | Status: SHIPPED | OUTPATIENT
Start: 2020-04-23 | End: 2020-05-19

## 2020-04-23 RX ORDER — PYRIDOSTIGMINE BROMIDE 60 MG/1
TABLET ORAL
Qty: 28 TABLET | Refills: 0 | Status: SHIPPED | OUTPATIENT
Start: 2020-04-23 | End: 2020-05-19

## 2020-04-23 RX ORDER — OXYBUTYNIN CHLORIDE 5 MG/1
TABLET ORAL
Qty: 56 TABLET | Refills: 0 | Status: SHIPPED | OUTPATIENT
Start: 2020-04-23 | End: 2020-05-19

## 2020-05-26 ENCOUNTER — OFFICE VISIT (OUTPATIENT)
Dept: PRIMARY CARE CLINIC | Age: 65
End: 2020-05-26
Payer: COMMERCIAL

## 2020-05-26 VITALS
SYSTOLIC BLOOD PRESSURE: 138 MMHG | TEMPERATURE: 97.6 F | HEART RATE: 75 BPM | DIASTOLIC BLOOD PRESSURE: 86 MMHG | OXYGEN SATURATION: 97 %

## 2020-05-26 LAB — HBA1C MFR BLD: 5.8 %

## 2020-05-26 PROCEDURE — 4004F PT TOBACCO SCREEN RCVD TLK: CPT | Performed by: FAMILY MEDICINE

## 2020-05-26 PROCEDURE — 83036 HEMOGLOBIN GLYCOSYLATED A1C: CPT | Performed by: FAMILY MEDICINE

## 2020-05-26 PROCEDURE — 99214 OFFICE O/P EST MOD 30 MIN: CPT | Performed by: FAMILY MEDICINE

## 2020-05-26 PROCEDURE — 3044F HG A1C LEVEL LT 7.0%: CPT | Performed by: FAMILY MEDICINE

## 2020-05-26 PROCEDURE — 3017F COLORECTAL CA SCREEN DOC REV: CPT | Performed by: FAMILY MEDICINE

## 2020-05-26 PROCEDURE — 2022F DILAT RTA XM EVC RTNOPTHY: CPT | Performed by: FAMILY MEDICINE

## 2020-05-26 PROCEDURE — G8421 BMI NOT CALCULATED: HCPCS | Performed by: FAMILY MEDICINE

## 2020-05-26 PROCEDURE — G8427 DOCREV CUR MEDS BY ELIG CLIN: HCPCS | Performed by: FAMILY MEDICINE

## 2020-05-26 RX ORDER — POTASSIUM CHLORIDE 750 MG/1
TABLET, FILM COATED, EXTENDED RELEASE ORAL
Qty: 90 TABLET | Refills: 1 | Status: SHIPPED | OUTPATIENT
Start: 2020-05-26 | End: 2020-11-24

## 2020-05-26 RX ORDER — ATORVASTATIN CALCIUM 20 MG/1
TABLET, FILM COATED ORAL
Qty: 90 TABLET | Refills: 1 | Status: SHIPPED | OUTPATIENT
Start: 2020-05-26 | End: 2021-01-06

## 2020-05-26 RX ORDER — PYRIDOSTIGMINE BROMIDE 60 MG/1
TABLET ORAL
Qty: 90 TABLET | Refills: 1 | Status: SHIPPED | OUTPATIENT
Start: 2020-05-26 | End: 2021-01-06

## 2020-05-26 RX ORDER — OMEPRAZOLE 20 MG/1
CAPSULE, DELAYED RELEASE ORAL
Qty: 90 CAPSULE | Refills: 0 | Status: SHIPPED | OUTPATIENT
Start: 2020-05-26 | End: 2020-09-10

## 2020-05-26 RX ORDER — LOSARTAN POTASSIUM 25 MG/1
TABLET ORAL
Qty: 90 TABLET | Refills: 1 | Status: SHIPPED | OUTPATIENT
Start: 2020-05-26 | End: 2021-01-06

## 2020-05-26 RX ORDER — FUROSEMIDE 40 MG/1
TABLET ORAL
Qty: 90 TABLET | Refills: 1 | Status: SHIPPED | OUTPATIENT
Start: 2020-05-26 | End: 2021-01-07 | Stop reason: SDUPTHER

## 2020-05-26 RX ORDER — OXYBUTYNIN CHLORIDE 5 MG/1
TABLET ORAL
Qty: 180 TABLET | Refills: 1 | Status: SHIPPED | OUTPATIENT
Start: 2020-05-26 | End: 2021-02-24

## 2020-05-26 RX ORDER — DULOXETIN HYDROCHLORIDE 60 MG/1
CAPSULE, DELAYED RELEASE ORAL
Qty: 90 CAPSULE | Refills: 1 | Status: SHIPPED | OUTPATIENT
Start: 2020-05-26 | End: 2021-01-06

## 2020-05-26 RX ORDER — OXYCODONE HYDROCHLORIDE AND ACETAMINOPHEN 5; 325 MG/1; MG/1
1 TABLET ORAL DAILY PRN
Qty: 30 TABLET | Refills: 0 | Status: SHIPPED | OUTPATIENT
Start: 2020-05-26 | End: 2020-07-21 | Stop reason: SDUPTHER

## 2020-05-26 ASSESSMENT — ENCOUNTER SYMPTOMS
RHINORRHEA: 0
CONSTIPATION: 1
ABDOMINAL PAIN: 0
WHEEZING: 0
NAUSEA: 0
COUGH: 0
EYE DISCHARGE: 0
EYE REDNESS: 0
VOMITING: 0
SHORTNESS OF BREATH: 0
SORE THROAT: 0
DIARRHEA: 1

## 2020-05-26 NOTE — PROGRESS NOTES
[address] [place] [address] PRIMARY CARE  [address]  [facility] [address]  Dept: [phone]    Brian Aguilar is a 63 y.o. female who presents today for her medical conditions/complaintsas noted below. Chief Complaint   Patient presents with    Diabetes       HPI:     HPI  Patient here for diabetes recheck. Denies any low blood sugar reactions. No chest pain or shortness of breath. Patient did not get her Cologuard done. States having a hard time collecting a sample due to her transverse myelitis. Patient not checking her blood pressure outside the office. Patient states has been using her omeprazole only on an as-needed basis. Patient had blood work ordered by her neurologist.  Patient states bowels are regular. Can go 1 or 2 weeks without having a movement and will go 1 or 2 weeks with watery stools. States MiraLAX had not helped in the past.    LDL Calculated (mg/dL)   Date Value   02/24/2020 117   12/31/2018 75   02/09/2018 135       (goal LDL is <100)   BUN (mg/dL)   Date Value   09/03/2019 15     BP Readings from Last 3 Encounters:   05/26/20 138/86   01/29/20 134/84   09/11/19 138/88          (goal 120/80)    No past medical history on file. Past Surgical History:   Procedure Laterality Date    APPENDECTOMY  80    CHOLECYSTECTOMY, OPEN  48    FEMUR FRACTURE SURGERY Left 05/2018    HYSTERECTOMY, TOTAL ABDOMINAL  1988       No family history on file. Social History     Tobacco Use    Smoking status: Never Smoker    Smokeless tobacco: Never Used   Substance Use Topics    Alcohol use: No      Current Outpatient Medications   Medication Sig Dispense Refill    potassium chloride (KLOR-CON) 10 MEQ extended release tablet TAKE (1) TABLET EVERY MORNING 90 tablet 1    atorvastatin (LIPITOR) 20 MG tablet TAKE (1) TABLET IN THE EVENING.  90 tablet 1    furosemide (LASIX) 40 MG tablet TAKE (1) TABLET EVERY MORNING 90 tablet 1    pyridostigmine (MESTINON) 60 MG tablet TAKE (1) TABLET EVERY MORNING 90 tablet 1    losartan (COZAAR) 25 MG tablet TAKE (1) TABLET EVERY MORNING 90 tablet 1    oxybutynin (DITROPAN) 5 MG tablet TAKE (1) TABLET EVERY MORNING AND EVENING 180 tablet 1    DULoxetine (CYMBALTA) 60 MG extended release capsule TAKE (1) CAPSULE EVERY MORNING 90 capsule 1    rivaroxaban (XARELTO) 20 MG TABS tablet TAKE (1) TABLET IN THE EVENING. 90 tablet 1    omeprazole (PRILOSEC) 20 MG delayed release capsule TAKE (1) CAPSULE EVERY MORNING 90 capsule 0    metoprolol tartrate (LOPRESSOR) 25 MG tablet TAKE (1) TABLET EVERY MORNING AND EVENING 180 tablet 1    oxyCODONE-acetaminophen (PERCOCET) 5-325 MG per tablet Take 1 tablet by mouth daily as needed for Pain for up to 30 days.  30 tablet 0    NYAMYC 727601 UNIT/GM powder APPLY TOPICALLY 3 TO 4 TIMES DAILY 60 g 0    promethazine (PHENERGAN) 25 MG tablet TAKE (1) TABLET FOUR TIMES A DAY AS NEEDED FOR NAUSEA 20 tablet 0    metFORMIN (GLUCOPHAGE) 500 MG tablet Take 1 tablet by mouth 2 times daily (with meals) TAKE (2) TABLETS EVERY MORNING AND ONE TABLET IN THE EVENING 180 tablet 3    cyclobenzaprine (FLEXERIL) 10 MG tablet Take 1 tablet by mouth 2 times daily 60 tablet 5    Multiple Vitamin (MULTIVITAMIN) tablet TAKE 1 TABLET ONCE DAILY 28 tablet 3    Multiple Vitamins-Minerals (RA CENTRAL-HANNA SENIOR) TABS take 1 tablet by mouth once daily for PREVENTATIVE  0    potassium chloride (KLOR-CON M) 20 MEQ extended release tablet take 1 tablet by mouth twice a day WITH 10 MEQ TABLET TO EQUAL 30 MEQ 2 TIMES PER DAY  0    RA HEMORRHOIDAL 1-0.25-14.4-15 % CREA rectal cream apply TO RECTUM AREA once daily if needed for HEMMORROIDS  0    TRUEPLUS LANCETS 30G MISC use as directed TESTING ONCE A WEEK  0    hydrocortisone 2.5 % cream apply to RIGHT HAND AND ARM three times a day if needed for itching  0    TRUE METRIX BLOOD GLUCOSE TEST strip TEST ONCE A WEEK  0    RA ALLERGY 25 MG tablet take 1 tablet by mouth well-developed. HENT:      Head: Normocephalic and atraumatic. Eyes:      General: No scleral icterus. Right eye: No discharge. Left eye: No discharge. Conjunctiva/sclera: Conjunctivae normal.      Pupils: Pupils are equal, round, and reactive to light. Neck:      Thyroid: No thyromegaly. Trachea: No tracheal deviation. Cardiovascular:      Rate and Rhythm: Normal rate and regular rhythm. Heart sounds: Normal heart sounds. Comments: No carotid bruits  Pulmonary:      Effort: Pulmonary effort is normal. No respiratory distress. Breath sounds: Normal breath sounds. No wheezing. Lymphadenopathy:      Cervical: No cervical adenopathy. Skin:     General: Skin is warm. Findings: No rash. Neurological:      Mental Status: She is alert and oriented to person, place, and time. Psychiatric:         Behavior: Behavior normal.         Thought Content: Thought content normal.       Controlled Substance Monitoring:    Acute and Chronic Pain Monitoring:   RX Monitoring 5/26/2020   Periodic Controlled Substance Monitoring Possible medication side effects, risk of tolerance/dependence & alternative treatments discussed. ;No signs of potential drug abuse or diversion identified. Chronic Pain > 50 MEDD Obtained or confirmed \"Consent for Opioid Use\" on file. Chronic Pain > 120 MEDD Obtained or confirmed \"Medication Contract\" on file. Assessment:       Diagnosis Orders   1. Controlled type 2 diabetes mellitus without complication, without long-term current use of insulin ([facility])  [place] COLLECTION CAPILLARY BLOOD SPECIMEN    POCT glycosylated hemoglobin (Hb A1C)    atorvastatin (LIPITOR) 20 MG tablet   2. Essential hypertension  losartan (COZAAR) 25 MG tablet   3. Gastroesophageal reflux disease without esophagitis  omeprazole (PRILOSEC) 20 MG delayed release capsule   4.  Transverse myelitis ([address]  oxyCODONE-acetaminophen (PERCOCET) 5-325 MG per tablet        Plan:

## 2020-06-29 ENCOUNTER — TELEPHONE (OUTPATIENT)
Dept: PRIMARY CARE CLINIC | Age: 65
End: 2020-06-29

## 2020-06-29 NOTE — TELEPHONE ENCOUNTER
[address] calling and states the patient is requesting PT due to her legs.  Please approve/deny

## 2020-07-21 RX ORDER — OXYCODONE HYDROCHLORIDE AND ACETAMINOPHEN 5; 325 MG/1; MG/1
1 TABLET ORAL DAILY PRN
Qty: 30 TABLET | Refills: 0 | Status: SHIPPED | OUTPATIENT
Start: 2020-07-21 | End: 2020-09-09 | Stop reason: SDUPTHER

## 2020-08-04 ENCOUNTER — OFFICE VISIT (OUTPATIENT)
Dept: PRIMARY CARE CLINIC | Age: 65
End: 2020-08-04
Payer: COMMERCIAL

## 2020-08-04 VITALS
HEART RATE: 82 BPM | DIASTOLIC BLOOD PRESSURE: 78 MMHG | OXYGEN SATURATION: 96 % | TEMPERATURE: 97.5 F | SYSTOLIC BLOOD PRESSURE: 124 MMHG

## 2020-08-04 PROCEDURE — 3044F HG A1C LEVEL LT 7.0%: CPT | Performed by: FAMILY MEDICINE

## 2020-08-04 PROCEDURE — 4004F PT TOBACCO SCREEN RCVD TLK: CPT | Performed by: FAMILY MEDICINE

## 2020-08-04 PROCEDURE — 2022F DILAT RTA XM EVC RTNOPTHY: CPT | Performed by: FAMILY MEDICINE

## 2020-08-04 PROCEDURE — 99214 OFFICE O/P EST MOD 30 MIN: CPT | Performed by: FAMILY MEDICINE

## 2020-08-04 PROCEDURE — G8421 BMI NOT CALCULATED: HCPCS | Performed by: FAMILY MEDICINE

## 2020-08-04 PROCEDURE — G8427 DOCREV CUR MEDS BY ELIG CLIN: HCPCS | Performed by: FAMILY MEDICINE

## 2020-08-04 PROCEDURE — 3017F COLORECTAL CA SCREEN DOC REV: CPT | Performed by: FAMILY MEDICINE

## 2020-08-04 ASSESSMENT — PATIENT HEALTH QUESTIONNAIRE - PHQ9
2. FEELING DOWN, DEPRESSED OR HOPELESS: 0
SUM OF ALL RESPONSES TO PHQ QUESTIONS 1-9: 0
1. LITTLE INTEREST OR PLEASURE IN DOING THINGS: 0
SUM OF ALL RESPONSES TO PHQ QUESTIONS 1-9: 0
SUM OF ALL RESPONSES TO PHQ9 QUESTIONS 1 & 2: 0

## 2020-08-04 ASSESSMENT — ENCOUNTER SYMPTOMS
EYE DISCHARGE: 0
COUGH: 0
SORE THROAT: 0
NAUSEA: 0
ABDOMINAL PAIN: 0
VOMITING: 0
SHORTNESS OF BREATH: 0
EYE REDNESS: 0
RHINORRHEA: 0
DIARRHEA: 0
WHEEZING: 0

## 2020-08-04 NOTE — PROGRESS NOTES
tablet 1    oxybutynin (DITROPAN) 5 MG tablet TAKE (1) TABLET EVERY MORNING AND EVENING 180 tablet 1    DULoxetine (CYMBALTA) 60 MG extended release capsule TAKE (1) CAPSULE EVERY MORNING 90 capsule 1    rivaroxaban (XARELTO) 20 MG TABS tablet TAKE (1) TABLET IN THE EVENING.  90 tablet 1    omeprazole (PRILOSEC) 20 MG delayed release capsule TAKE (1) CAPSULE EVERY MORNING 90 capsule 0    metoprolol tartrate (LOPRESSOR) 25 MG tablet TAKE (1) TABLET EVERY MORNING AND EVENING 180 tablet 1    NYAMYC 733188 UNIT/GM powder APPLY TOPICALLY 3 TO 4 TIMES DAILY 60 g 0    promethazine (PHENERGAN) 25 MG tablet TAKE (1) TABLET FOUR TIMES A DAY AS NEEDED FOR NAUSEA 20 tablet 0    metFORMIN (GLUCOPHAGE) 500 MG tablet Take 1 tablet by mouth 2 times daily (with meals) TAKE (2) TABLETS EVERY MORNING AND ONE TABLET IN THE EVENING 180 tablet 3    cyclobenzaprine (FLEXERIL) 10 MG tablet Take 1 tablet by mouth 2 times daily 60 tablet 5    Multiple Vitamin (MULTIVITAMIN) tablet TAKE 1 TABLET ONCE DAILY 28 tablet 3    Multiple Vitamins-Minerals (RA CENTRAL-HANNA SENIOR) TABS take 1 tablet by mouth once daily for PREVENTATIVE  0    potassium chloride (KLOR-CON M) 20 MEQ extended release tablet take 1 tablet by mouth twice a day WITH 10 MEQ TABLET TO EQUAL 30 MEQ 2 TIMES PER DAY  0    RA HEMORRHOIDAL 1-0.25-14.4-15 % CREA rectal cream apply TO RECTUM AREA once daily if needed for HEMMORROIDS  0    TRUEPLUS LANCETS 30G MISC use as directed TESTING ONCE A WEEK  0    hydrocortisone 2.5 % cream apply to RIGHT HAND AND ARM three times a day if needed for itching  0    TRUE METRIX BLOOD GLUCOSE TEST strip TEST ONCE A WEEK  0    RA ALLERGY 25 MG tablet take 1 tablet by mouth three times a day if needed for itching  0    Blood Glucose Monitoring Suppl (TRUE METRIX METER) w/Device KIT use as directed ONCE A WEEK  0    RA ACETAMINOPHEN 325 MG tablet take 1 tablet by mouth every 4 hours if needed for **MILD** PAIN/...  (REFER TO PRESCRIPTION NOTES). 0     No current facility-administered medications for this visit. Allergies   Allergen Reactions    Compazine [Prochlorperazine]      Cause neurological sx    Sulfa Antibiotics      Childhood allergy       Health Maintenance   Topic Date Due    Shingles Vaccine (1 of 2) 02/14/2007    Colon cancer screen colonoscopy  02/14/2007    Flu vaccine (1) 09/01/2020    Annual Wellness Visit (AWV)  01/29/2021    Potassium monitoring  02/24/2021    Creatinine monitoring  02/24/2021    Breast cancer screen  04/04/2021    A1C test (Diabetic or Prediabetic)  05/26/2021    DTaP/Tdap/Td vaccine (3 - Td) 09/20/2028    Pneumococcal 0-64 years Vaccine  Completed    Hepatitis A vaccine  Aged Out    Hib vaccine  Aged Out    Meningococcal (ACWY) vaccine  Aged Out       Subjective:      Review of Systems   Constitutional: Negative for chills and fever. HENT: Negative for rhinorrhea and sore throat. Eyes: Negative for discharge and redness. Respiratory: Negative for cough, shortness of breath and wheezing. Cardiovascular: Negative for chest pain and palpitations. Gastrointestinal: Negative for abdominal pain, diarrhea, nausea and vomiting. Genitourinary: Negative for dysuria and frequency. Musculoskeletal: Negative for arthralgias and myalgias. Neurological: Negative for dizziness, light-headedness and headaches. Psychiatric/Behavioral: Negative for sleep disturbance. Objective:     /78   Pulse 82   Temp 97.5 °F (36.4 °C)   SpO2 96%   Physical Exam  Vitals signs and nursing note reviewed. Constitutional:       General: She is not in acute distress. Appearance: She is well-developed. HENT:      Head: Normocephalic and atraumatic. Right Ear: External ear normal.      Left Ear: External ear normal.   Eyes:      General: No scleral icterus. Right eye: No discharge. Left eye: No discharge.       Conjunctiva/sclera: Conjunctivae normal. Pupils: Pupils are equal, round, and reactive to light. Neck:      Thyroid: No thyromegaly. Trachea: No tracheal deviation. Cardiovascular:      Rate and Rhythm: Normal rate. Rhythm irregular. Heart sounds: Normal heart sounds. Comments: No carotid bruits  Pulmonary:      Effort: Pulmonary effort is normal. No respiratory distress. Breath sounds: Normal breath sounds. No wheezing. Lymphadenopathy:      Cervical: No cervical adenopathy. Skin:     General: Skin is warm and dry. Findings: No rash. Neurological:      Mental Status: She is alert and oriented to person, place, and time. Comments: Diabetic foot check: Bunions: none . Hammertoes mild. Plantar calluses mod. No cyanosis or clubbing. sensation intact on 6 of 6 test points with the 10 gram filament. Dorsalis pedis pulses intact bilaterally. Capillary refill at the toes was less than 2 seconds. No skin breakdown, erythema, blisters, scaling, or ulcers. No evidence of fungal infection. Emaciated skin noted between the toes. Psychiatric:         Behavior: Behavior normal.         Thought Content: Thought content normal.         Assessment:       Diagnosis Orders   1. Tinea pedis of both feet     2. Encounter for screening mammogram for malignant neoplasm of breast  [facility] DIGITAL SCREEN W OR WO CAD BILATERAL   3. Chronic atrial fibrillation     4. Controlled type 2 diabetes mellitus without complication, without long-term current use of insulin ([facility] [address]   DIABETES FOOT EXAM        Plan:    Order for diabetic shoes. Order for mammogram.  Loprox cream twice daily between the toes. Continue other medications as is. Return in about 3 months (around 11/4/2020).     Orders Placed This Encounter   Procedures    [facility] DIGITAL SCREEN W OR WO CAD BILATERAL     Standing Status:   Future     Standing Expiration Date:   10/4/2021     Order Specific Question:   Reason for exam:     Answer:   screen     DIABETES FOOT EXAM     Orders

## 2020-08-27 ENCOUNTER — HOSPITAL ENCOUNTER (OUTPATIENT)
Dept: MAMMOGRAPHY | Age: 65
Discharge: HOME | End: 2020-08-29
Payer: COMMERCIAL

## 2020-08-27 PROCEDURE — 77063 BREAST TOMOSYNTHESIS BI: CPT

## 2020-09-09 RX ORDER — OXYCODONE HYDROCHLORIDE AND ACETAMINOPHEN 5; 325 MG/1; MG/1
1 TABLET ORAL DAILY PRN
Qty: 30 TABLET | Refills: 0 | Status: SHIPPED | OUTPATIENT
Start: 2020-09-09 | End: 2020-11-24

## 2020-09-09 RX ORDER — BACLOFEN 10 MG/1
10 TABLET ORAL 3 TIMES DAILY
Qty: 90 TABLET | Refills: 5 | Status: SHIPPED | OUTPATIENT
Start: 2020-09-09 | End: 2022-04-19

## 2020-09-10 RX ORDER — OMEPRAZOLE 20 MG/1
CAPSULE, DELAYED RELEASE ORAL
Qty: 28 CAPSULE | Refills: 0 | Status: SHIPPED | OUTPATIENT
Start: 2020-09-10 | End: 2020-10-09

## 2020-09-14 ENCOUNTER — TELEPHONE (OUTPATIENT)
Dept: PRIMARY CARE CLINIC | Age: 65
End: 2020-09-14

## 2020-10-06 ENCOUNTER — TELEPHONE (OUTPATIENT)
Dept: PRIMARY CARE CLINIC | Age: 65
End: 2020-10-06

## 2020-10-06 NOTE — TELEPHONE ENCOUNTER
Pt states her daughter whom usually cares for her has been dx with covid. Pt states she is feeling weak and nauseous, she is drinking fluids but not really eating and has chills but hasn't checked her temp. Should pt go to ER to be seen for fluids and  assistance or what do you recommend? She states there is no one else that can help her.  She is a paraplegic

## 2020-10-07 ENCOUNTER — HOSPITAL ENCOUNTER (EMERGENCY)
Age: 65
Discharge: ANOTHER HOSPITAL | End: 2020-10-07
Attending: EMERGENCY MEDICINE
Payer: COMMERCIAL

## 2020-10-07 VITALS
TEMPERATURE: 98.2 F | HEART RATE: 99 BPM | DIASTOLIC BLOOD PRESSURE: 83 MMHG | OXYGEN SATURATION: 95 % | SYSTOLIC BLOOD PRESSURE: 143 MMHG | RESPIRATION RATE: 16 BRPM | BODY MASS INDEX: 37.21 KG/M2 | HEIGHT: 63 IN | WEIGHT: 210 LBS

## 2020-10-07 LAB
ABSOLUTE EOS #: 0 K/UL (ref 0–0.4)
ABSOLUTE IMMATURE GRANULOCYTE: ABNORMAL K/UL (ref 0–0.3)
ABSOLUTE LYMPH #: 1.5 K/UL (ref 1–4.8)
ABSOLUTE MONO #: 0.4 K/UL (ref 0.1–1.2)
ALBUMIN SERPL-MCNC: 3.8 G/DL (ref 3.5–5.2)
ALBUMIN/GLOBULIN RATIO: 1.3 (ref 1–2.5)
ALP BLD-CCNC: 86 U/L (ref 35–104)
ALT SERPL-CCNC: 12 U/L (ref 5–33)
ANION GAP SERPL CALCULATED.3IONS-SCNC: 13 MMOL/L (ref 9–17)
AST SERPL-CCNC: 16 U/L
BASOPHILS # BLD: 0 % (ref 0–2)
BASOPHILS ABSOLUTE: 0 K/UL (ref 0–0.2)
BILIRUB SERPL-MCNC: 1.21 MG/DL (ref 0.3–1.2)
BUN BLDV-MCNC: 19 MG/DL (ref 8–23)
BUN/CREAT BLD: ABNORMAL (ref 9–20)
CALCIUM SERPL-MCNC: 8.8 MG/DL (ref 8.6–10.4)
CHLORIDE BLD-SCNC: 102 MMOL/L (ref 98–107)
CO2: 22 MMOL/L (ref 20–31)
CREAT SERPL-MCNC: 0.5 MG/DL (ref 0.5–0.9)
DIFFERENTIAL TYPE: ABNORMAL
EOSINOPHILS RELATIVE PERCENT: 0 % (ref 1–4)
GFR AFRICAN AMERICAN: >60 ML/MIN
GFR NON-AFRICAN AMERICAN: >60 ML/MIN
GFR SERPL CREATININE-BSD FRML MDRD: ABNORMAL ML/MIN/{1.73_M2}
GFR SERPL CREATININE-BSD FRML MDRD: ABNORMAL ML/MIN/{1.73_M2}
GLUCOSE BLD-MCNC: 92 MG/DL (ref 70–99)
HCT VFR BLD CALC: 43.9 % (ref 36–46)
HEMOGLOBIN: 14.2 G/DL (ref 12–16)
IMMATURE GRANULOCYTES: ABNORMAL %
LACTIC ACID, SEPSIS WHOLE BLOOD: NORMAL MMOL/L (ref 0.5–1.9)
LACTIC ACID, SEPSIS: 1 MMOL/L (ref 0.5–1.9)
LYMPHOCYTES # BLD: 23 % (ref 24–44)
MCH RBC QN AUTO: 30 PG (ref 26–34)
MCHC RBC AUTO-ENTMCNC: 32.4 G/DL (ref 31–37)
MCV RBC AUTO: 92.6 FL (ref 80–100)
MONOCYTES # BLD: 7 % (ref 2–11)
NRBC AUTOMATED: ABNORMAL PER 100 WBC
PDW BLD-RTO: 14.5 % (ref 12.5–15.4)
PLATELET # BLD: 159 K/UL (ref 140–450)
PLATELET ESTIMATE: ABNORMAL
PMV BLD AUTO: 9.7 FL (ref 6–12)
POTASSIUM SERPL-SCNC: 3.6 MMOL/L (ref 3.7–5.3)
RBC # BLD: 4.74 M/UL (ref 4–5.2)
RBC # BLD: ABNORMAL 10*6/UL
SEG NEUTROPHILS: 70 % (ref 36–66)
SEGMENTED NEUTROPHILS ABSOLUTE COUNT: 4.7 K/UL (ref 1.8–7.7)
SODIUM BLD-SCNC: 137 MMOL/L (ref 135–144)
TOTAL PROTEIN: 6.7 G/DL (ref 6.4–8.3)
WBC # BLD: 6.7 K/UL (ref 3.5–11)
WBC # BLD: ABNORMAL 10*3/UL

## 2020-10-07 PROCEDURE — 80053 COMPREHEN METABOLIC PANEL: CPT

## 2020-10-07 PROCEDURE — 36415 COLL VENOUS BLD VENIPUNCTURE: CPT

## 2020-10-07 PROCEDURE — 2580000003 HC RX 258: Performed by: EMERGENCY MEDICINE

## 2020-10-07 PROCEDURE — 99283 EMERGENCY DEPT VISIT LOW MDM: CPT

## 2020-10-07 PROCEDURE — 85025 COMPLETE CBC W/AUTO DIFF WBC: CPT

## 2020-10-07 PROCEDURE — 83605 ASSAY OF LACTIC ACID: CPT

## 2020-10-07 RX ORDER — 0.9 % SODIUM CHLORIDE 0.9 %
1000 INTRAVENOUS SOLUTION INTRAVENOUS ONCE
Status: COMPLETED | OUTPATIENT
Start: 2020-10-07 | End: 2020-10-07

## 2020-10-07 RX ADMIN — SODIUM CHLORIDE 1000 ML: 9 INJECTION, SOLUTION INTRAVENOUS at 13:02

## 2020-10-07 NOTE — ED NOTES
At bedside with Dr Humaira Contreras to speak with patient. She was advised, per Dr Nikolas Castillo request, that if admitted she could be looking at out of pocket expense because her stay will probably not be covered by insurance due to observation status for respite care. Pt Upset and reports she cannot go home because her daughter cannot care for her due to her own illness with COVID. Pt agreeable to transfer to [facility] despite possible out of pocket expense due to insurance not covering her stay.  [facility] notified by Dr Kelvin Read, RN  10/07/20 [id number]

## 2020-10-07 NOTE — ED NOTES
Call placed to [facility] by Dr Yoandy Tinajero for transfer of patient.       Evelia Simon RN  10/07/20 9232

## 2020-10-07 NOTE — ED NOTES
Pt to room 12 via EMS cart. Pt reports 4 days ago she had an onset of diarrhea and nausea. Pt reports she lives with her daughter that is COVID-19 + and her daughter has been taking care of her. Pt reports she has had increased weakness and fatigue since onset of s/s. Pt reports she has not had a fever, denies any cough or congestion. Pt reports she is able to keep water down but reports a decreased appetite and inability to eat. Pt reports the reason for coming to ED today is because her caregiver can no longer take care of her due to her own illness. Pt reports she does have help in the morning but states she has no help during the day to toilet her or put her to bed. Pt alert and oriented speaking sentences no distress noted. Pt skin pink warm and dry, mucous membranes pink and moist. Pt abd soft no non tender.       Stacy Jaeger, ARABELLA  10/07/20 [id number]

## 2020-10-07 NOTE — TELEPHONE ENCOUNTER
Continue to increase fluids and use Delsym for cough.   If she should become too weak or too short of breath, then should go to the emergency room for evaluation

## 2020-10-07 NOTE — ED NOTES
Pt transported to [address] [place] [address] with all belongings.       Rick Vuong RN  10/07/20 [id number]

## 2020-10-07 NOTE — ED PROVIDER NOTES
[address]      Pt Name: Kaiden Fisher  MRN: [id number]  [place] 1955  Date of evaluation: 10/7/2020      CHIEF COMPLAINT       Chief Complaint   Patient presents with    Diarrhea    Nausea         HISTORY OF PRESENT ILLNESS      The patient presents with diarrhea and nausea since Saturday. It is now Wednesday. The patient has a history of transverse myelitis and is unable to stand on her legs. She has a caregiver that comes in once a day to help, but she is also relying upon her family member. Her family member is COVID positive. The patient has not really had a fever or difficulty breathing. She is concerned because her caregiver with [employer] is having difficulty caring for her. She thinks she may need to be admitted to the hospital because she has no other resources. The patient says she is thinks she is getting dehydrated. She is not confident she is been able to drink enough fluids. She has not noted any blood in her stool. She denies abdominal pain. Nothing makes her symptoms better or worse otherwise. REVIEW OF SYSTEMS       All systems reviewed and negative unless noted in HPI. The patient denies fever or constitutional symptoms. Denies vision change. Denies any sore throat or rhinorrhea. Denies any neck pain or stiffness. Denies chest pain or shortness of breath. Nausea and diarrhea; about 3 diarrheal stools per day. Denies any dysuria. Denies urinary frequency or hematuria. Denies musculoskeletal injury or pain. Right wrist fused. Wears leg braces. History of paraplegia secondary to transverse myelitis. Denies any skin rash or edema. No recent psychiatric issues. Takes blood thinners. History of DM.        PAST MEDICAL HISTORY    has a past medical history of Acute transverse myelitis in demyelinating disease of central nervous system ([address] Atrial fibrillation ([address] Depression, Hyperlipidemia, and Hypertension. SURGICAL HISTORY      has a past surgical history that includes Hysterectomy, total abdominal (1988); Cholecystectomy, open (1984); Appendectomy (1988); and Femur fracture surgery (Left, 05/2018). CURRENT MEDICATIONS       Previous Medications    ATORVASTATIN (LIPITOR) 20 MG TABLET    TAKE (1) TABLET IN THE EVENING. BACLOFEN (LIORESAL) 10 MG TABLET    Take 1 tablet by mouth 3 times daily    BLOOD GLUCOSE MONITORING SUPPL (TRUE METRIX METER) W/DEVICE KIT    use as directed ONCE A WEEK    CICLOPIROX (LOPROX) 0.77 % CREAM    Apply topically 2 times daily. DULOXETINE (CYMBALTA) 60 MG EXTENDED RELEASE CAPSULE    TAKE (1) CAPSULE EVERY MORNING    FUROSEMIDE (LASIX) 40 MG TABLET    TAKE (1) TABLET EVERY MORNING    HYDROCORTISONE 2.5 % CREAM    apply to RIGHT HAND AND ARM three times a day if needed for itching    LOSARTAN (COZAAR) 25 MG TABLET    TAKE (1) TABLET EVERY MORNING    METFORMIN (GLUCOPHAGE) 500 MG TABLET    TAKE (2) TABLETS EVERY MORNING AND 1 TABLET IN THE EVENING    METOPROLOL TARTRATE (LOPRESSOR) 25 MG TABLET    TAKE (1) TABLET EVERY MORNING AND EVENING    MULTIPLE VITAMIN (MULTIVITAMIN) TABLET    TAKE 1 TABLET ONCE DAILY    MULTIPLE VITAMINS-MINERALS (RA CENTRAL-HANNA SENIOR) TABS    take 1 tablet by mouth once daily for PREVENTATIVE    NYAMYC 947666 UNIT/GM POWDER    APPLY TOPICALLY 3 TO 4 TIMES DAILY    OMEPRAZOLE (PRILOSEC) 20 MG DELAYED RELEASE CAPSULE    TAKE (1) CAPSULE EVERY MORNING    OXYBUTYNIN (DITROPAN) 5 MG TABLET    TAKE (1) TABLET EVERY MORNING AND EVENING    OXYCODONE-ACETAMINOPHEN (PERCOCET) 5-325 MG PER TABLET    Take 1 tablet by mouth daily as needed for Pain for up to 30 days.     POTASSIUM CHLORIDE (KLOR-CON M) 20 MEQ EXTENDED RELEASE TABLET    take 1 tablet by mouth twice a day WITH 10 MEQ TABLET TO EQUAL 30 MEQ 2 TIMES PER DAY    POTASSIUM CHLORIDE (KLOR-CON) 10 MEQ EXTENDED RELEASE TABLET    TAKE (1) TABLET EVERY MORNING    PROMETHAZINE (PHENERGAN) 25 MG TABLET TAKE (1) TABLET FOUR TIMES A DAY AS NEEDED FOR NAUSEA    PYRIDOSTIGMINE (MESTINON) 60 MG TABLET    TAKE (1) TABLET EVERY MORNING    RA ACETAMINOPHEN 325 MG TABLET    take 1 tablet by mouth every 4 hours if needed for **MILD** PAIN/...  (REFER TO PRESCRIPTION NOTES). RA ALLERGY 25 MG TABLET    take 1 tablet by mouth three times a day if needed for itching    RA HEMORRHOIDAL 1-0.25-14.4-15 % CREA RECTAL CREAM    apply TO RECTUM AREA once daily if needed for HEMMORROIDS    RIVAROXABAN (XARELTO) 20 MG TABS TABLET    TAKE (1) TABLET IN THE EVENING. TRUE METRIX BLOOD GLUCOSE TEST STRIP    TEST ONCE A WEEK    TRUEPLUS LANCETS 30G MISC    use as directed TESTING ONCE A WEEK       ALLERGIES     is allergic to compazine [prochlorperazine] and sulfa antibiotics. FAMILY HISTORY     has no family status information on file. family history is not on file. SOCIAL HISTORY      reports that she has never smoked. She has never used smokeless tobacco. She reports that she does not drink alcohol or use drugs. PHYSICAL EXAM     INITIAL VITALS:  height is 5' 3\" (1.6 m) and weight is 95.3 kg (210 lb). Her oral temperature is 97.9 °F (36.6 °C). Her blood pressure is 143/83 (abnormal) and her pulse is 99. Her respiration is 16 and oxygen saturation is 95%. The patient is alert and oriented, in no apparent distress. HEENT is atraumatic. Pupils are PERRL at 4 mm with normal extraocular motion. Mucous membranes moist.    Neck is supple. Heart:  regular rate and rhythm. Lungs: No wheezes or respiratory distress. Abdomen: soft, nontender with no pain to palpation. No pulsatile mass. No rebound or guarding. Musculoskeletal exam shows no evidence of trauma. Normal distal pulses in all extremities. Skin: trace edema in legs, bilat. Neurological exam reveals cranial nerves 2 through 12 grossly intact. Right wrist fused; decreased tone in lower extremities with trace edema.  Patient has equal  and normal deep tendon reflexes. Psychiatric: no hallucinations or suicidal ideation. Lymphatics.:  No lymphadenopathy.          DIFFERENTIAL DIAGNOSIS/ MDM:     Covid-19, diarrhea, dehydration, electrolyte abnormality    DIAGNOSTIC RESULTS         LABS:  Results for orders placed or performed during the hospital encounter of 10/07/20   Lactate, Sepsis   Result Value Ref Range    Lactic Acid, Sepsis 1.0 0.5 - 1.9 mmol/L    Lactic Acid, Sepsis, Whole Blood NOT REPORTED 0.5 - 1.9 mmol/L   CBC Auto Differential   Result Value Ref Range    WBC 6.7 3.5 - 11.0 k/uL    RBC 4.74 4.0 - 5.2 m/uL    Hemoglobin 14.2 12.0 - 16.0 g/dL    Hematocrit 43.9 36 - 46 %    MCV 92.6 80 - 100 fL    MCH 30.0 26 - 34 pg    MCHC 32.4 31 - 37 g/dL    RDW 14.5 12.5 - 15.4 %    Platelets 122 015 - 454 k/uL    MPV 9.7 6.0 - 12.0 fL    NRBC Automated NOT REPORTED per 100 WBC    Differential Type NOT REPORTED     Seg Neutrophils 70 (H) 36 - 66 %    Lymphocytes 23 (L) 24 - 44 %    Monocytes 7 2 - 11 %    Eosinophils % 0 (L) 1 - 4 %    Basophils 0 0 - 2 %    Immature Granulocytes NOT REPORTED 0 %    Segs Absolute 4.70 1.8 - 7.7 k/uL    Absolute Lymph # 1.50 1.0 - 4.8 k/uL    Absolute Mono # 0.40 0.1 - 1.2 k/uL    Absolute Eos # 0.00 0.0 - 0.4 k/uL    Basophils Absolute 0.00 0.0 - 0.2 k/uL    Absolute Immature Granulocyte NOT REPORTED 0.00 - 0.30 k/uL    WBC Morphology NOT REPORTED     RBC Morphology NOT REPORTED     Platelet Estimate NOT REPORTED    Comprehensive Metabolic Panel   Result Value Ref Range    Glucose 92 70 - 99 mg/dL    BUN 19 8 - 23 mg/dL    CREATININE 0.50 0.50 - 0.90 mg/dL    Bun/Cre Ratio NOT REPORTED 9 - 20    Calcium 8.8 8.6 - 10.4 mg/dL    Sodium 137 135 - 144 mmol/L    Potassium 3.6 (L) 3.7 - 5.3 mmol/L    Chloride 102 98 - 107 mmol/L    CO2 22 20 - 31 mmol/L    Anion Gap 13 9 - 17 mmol/L    Alkaline Phosphatase 86 35 - 104 U/L    ALT 12 5 - 33 U/L    AST 16 <32 U/L    Total Bilirubin 1.21 (H) 0.3 - 1.2 mg/dL    Total Protein Emergency Physician         Hortencia Maddox MD  10/07/20 [id number]

## 2020-10-09 ENCOUNTER — TELEPHONE (OUTPATIENT)
Dept: PRIMARY CARE CLINIC | Age: 65
End: 2020-10-09

## 2020-10-09 RX ORDER — OMEPRAZOLE 20 MG/1
CAPSULE, DELAYED RELEASE ORAL
Qty: 28 CAPSULE | Refills: 0 | Status: SHIPPED | OUTPATIENT
Start: 2020-10-09 | End: 2021-01-06

## 2020-10-09 NOTE — TELEPHONE ENCOUNTER
Tj Sánchez from [place] (Pt's ) calling. Pt is at [facility], dx'd with covid. Pt's daughter also has covid. They are trying to place pt in [facility], or somewhere. Pt's daughter too sick to take care of her.  Tj Sánchez will let office know where pt will be. [phone]

## 2020-11-13 RX ORDER — OXYCODONE HYDROCHLORIDE AND ACETAMINOPHEN 5; 325 MG/1; MG/1
1 TABLET ORAL DAILY PRN
Qty: 30 TABLET | Refills: 0 | OUTPATIENT
Start: 2020-11-13 | End: 2020-12-13

## 2020-11-24 ENCOUNTER — OFFICE VISIT (OUTPATIENT)
Dept: PRIMARY CARE CLINIC | Age: 65
End: 2020-11-24
Payer: COMMERCIAL

## 2020-11-24 VITALS
OXYGEN SATURATION: 96 % | DIASTOLIC BLOOD PRESSURE: 74 MMHG | TEMPERATURE: 97 F | HEART RATE: 86 BPM | SYSTOLIC BLOOD PRESSURE: 128 MMHG

## 2020-11-24 LAB — HBA1C MFR BLD: 5.9 %

## 2020-11-24 PROCEDURE — 90686 IIV4 VACC NO PRSV 0.5 ML IM: CPT | Performed by: FAMILY MEDICINE

## 2020-11-24 PROCEDURE — G0008 ADMIN INFLUENZA VIRUS VAC: HCPCS | Performed by: FAMILY MEDICINE

## 2020-11-24 PROCEDURE — 99214 OFFICE O/P EST MOD 30 MIN: CPT | Performed by: FAMILY MEDICINE

## 2020-11-24 PROCEDURE — 1111F DSCHRG MED/CURRENT MED MERGE: CPT | Performed by: FAMILY MEDICINE

## 2020-11-24 PROCEDURE — 83036 HEMOGLOBIN GLYCOSYLATED A1C: CPT | Performed by: FAMILY MEDICINE

## 2020-11-24 RX ORDER — OXYCODONE HYDROCHLORIDE AND ACETAMINOPHEN 5; 325 MG/1; MG/1
1 TABLET ORAL DAILY PRN
Qty: 30 TABLET | Refills: 0 | Status: SHIPPED | OUTPATIENT
Start: 2020-11-24 | End: 2021-01-05 | Stop reason: SDUPTHER

## 2020-11-24 RX ORDER — OXYBUTYNIN CHLORIDE 5 MG/1
TABLET, EXTENDED RELEASE ORAL
COMMUNITY
Start: 2020-10-09 | End: 2020-11-24

## 2020-11-24 RX ORDER — NYSTATIN 100000 [USP'U]/G
POWDER TOPICAL
Qty: 60 G | Refills: 0 | Status: SHIPPED | OUTPATIENT
Start: 2020-11-24 | End: 2021-01-06

## 2020-11-24 RX ORDER — POTASSIUM CHLORIDE 750 MG/1
TABLET, FILM COATED, EXTENDED RELEASE ORAL
Qty: 90 TABLET | Refills: 3 | Status: SHIPPED | OUTPATIENT
Start: 2020-11-24 | End: 2021-10-21 | Stop reason: SDUPTHER

## 2020-11-24 ASSESSMENT — ENCOUNTER SYMPTOMS
DIARRHEA: 0
SHORTNESS OF BREATH: 0
EYE DISCHARGE: 0
EYE REDNESS: 0
SORE THROAT: 0
BACK PAIN: 1
NAUSEA: 0
ABDOMINAL PAIN: 0
COUGH: 0
RHINORRHEA: 0
VOMITING: 0
WHEEZING: 0

## 2020-11-24 ASSESSMENT — PATIENT HEALTH QUESTIONNAIRE - PHQ9
SUM OF ALL RESPONSES TO PHQ QUESTIONS 1-9: 0
SUM OF ALL RESPONSES TO PHQ9 QUESTIONS 1 & 2: 0
2. FEELING DOWN, DEPRESSED OR HOPELESS: 0
SUM OF ALL RESPONSES TO PHQ QUESTIONS 1-9: 0
1. LITTLE INTEREST OR PLEASURE IN DOING THINGS: 0
SUM OF ALL RESPONSES TO PHQ QUESTIONS 1-9: 0

## 2020-11-24 NOTE — PROGRESS NOTES
furosemide (LASIX) 40 MG tablet  TAKE (1) TABLET EVERY MORNING             hydrocortisone 2.5 % cream  apply to RIGHT HAND AND ARM three times a day if needed for itching             losartan (COZAAR) 25 MG tablet  TAKE (1) TABLET EVERY MORNING             metFORMIN (GLUCOPHAGE) 500 MG tablet  Take 1 tablet by mouth daily (with breakfast)             metoprolol tartrate (LOPRESSOR) 25 MG tablet  TAKE (1) TABLET EVERY MORNING AND EVENING             Multiple Vitamin (MULTIVITAMIN) tablet  TAKE 1 TABLET ONCE DAILY             Multiple Vitamins-Minerals (RA CENTRAL-HANNA SENIOR) TABS  take 1 tablet by mouth once daily for PREVENTATIVE             nystatin (NYAMYC) 969474 UNIT/GM powder  APPLY TOPICALLY 3 TO 4 TIMES DAILY             omeprazole (PRILOSEC) 20 MG delayed release capsule  TAKE (1) CAPSULE EVERY MORNING             oxybutynin (DITROPAN) 5 MG tablet  TAKE (1) TABLET EVERY MORNING AND EVENING             oxyCODONE-acetaminophen (PERCOCET) 5-325 MG per tablet  Take 1 tablet by mouth daily as needed for Pain for up to 30 days. potassium chloride (KLOR-CON) 10 MEQ extended release tablet  TAKE (1) TABLET EVERY MORNING             promethazine (PHENERGAN) 25 MG tablet  TAKE (1) TABLET FOUR TIMES A DAY AS NEEDED FOR NAUSEA             pyridostigmine (MESTINON) 60 MG tablet  TAKE (1) TABLET EVERY MORNING             RA ACETAMINOPHEN 325 MG tablet  take 1 tablet by mouth every 4 hours if needed for **MILD** PAIN/...  (REFER TO PRESCRIPTION NOTES). RA ALLERGY 25 MG tablet  take 1 tablet by mouth three times a day if needed for itching             RA HEMORRHOIDAL 1-0.25-14.4-15 % CREA rectal cream  apply TO RECTUM AREA once daily if needed for HEMMORROIDS             rivaroxaban (XARELTO) 20 MG TABS tablet  TAKE (1) TABLET IN THE EVENING.              TRUE METRIX BLOOD GLUCOSE TEST strip  TEST ONCE A WEEK             TRUEPLUS LANCETS 30G MISC  use as directed TESTING ONCE A WEEK Medications marked \"taking\" at this time  Outpatient Medications Marked as Taking for the 11/24/20 encounter (Office Visit) with Carolyn Nunez MD   Medication Sig Dispense Refill    nystatin [facility]) 924986 UNIT/GM powder APPLY TOPICALLY 3 TO 4 TIMES DAILY 60 g 0    oxyCODONE-acetaminophen (PERCOCET) 5-325 MG per tablet Take 1 tablet by mouth daily as needed for Pain for up to 30 days. 30 tablet 0    potassium chloride (KLOR-CON) 10 MEQ extended release tablet TAKE (1) TABLET EVERY MORNING 90 tablet 3    metFORMIN (GLUCOPHAGE) 500 MG tablet Take 1 tablet by mouth daily (with breakfast) 90 tablet 3    omeprazole (PRILOSEC) 20 MG delayed release capsule TAKE (1) CAPSULE EVERY MORNING 28 capsule 0    baclofen (LIORESAL) 10 MG tablet Take 1 tablet by mouth 3 times daily 90 tablet 5    ciclopirox (LOPROX) 0.77 % cream Apply topically 2 times daily. 90 g 1    atorvastatin (LIPITOR) 20 MG tablet TAKE (1) TABLET IN THE EVENING. 90 tablet 1    furosemide (LASIX) 40 MG tablet TAKE (1) TABLET EVERY MORNING 90 tablet 1    pyridostigmine (MESTINON) 60 MG tablet TAKE (1) TABLET EVERY MORNING 90 tablet 1    losartan (COZAAR) 25 MG tablet TAKE (1) TABLET EVERY MORNING 90 tablet 1    oxybutynin (DITROPAN) 5 MG tablet TAKE (1) TABLET EVERY MORNING AND EVENING 180 tablet 1    DULoxetine (CYMBALTA) 60 MG extended release capsule TAKE (1) CAPSULE EVERY MORNING 90 capsule 1    rivaroxaban (XARELTO) 20 MG TABS tablet TAKE (1) TABLET IN THE EVENING.  90 tablet 1    metoprolol tartrate (LOPRESSOR) 25 MG tablet TAKE (1) TABLET EVERY MORNING AND EVENING 180 tablet 1    promethazine (PHENERGAN) 25 MG tablet TAKE (1) TABLET FOUR TIMES A DAY AS NEEDED FOR NAUSEA 20 tablet 0    Multiple Vitamin (MULTIVITAMIN) tablet TAKE 1 TABLET ONCE DAILY 28 tablet 3    Multiple Vitamins-Minerals (RA CENTRAL-HANNA SENIOR) TABS take 1 tablet by mouth once daily for PREVENTATIVE  0    RA HEMORRHOIDAL 1-0.25-14.4-15 % CREA rectal cream apply TO RECTUM AREA once daily if needed for HEMMORROIDS  0    TRUEPLUS LANCETS 30G MISC use as directed TESTING ONCE A WEEK  0    hydrocortisone 2.5 % cream apply to RIGHT HAND AND ARM three times a day if needed for itching  0    TRUE METRIX BLOOD GLUCOSE TEST strip TEST ONCE A WEEK  0    RA ALLERGY 25 MG tablet take 1 tablet by mouth three times a day if needed for itching  0    Blood Glucose Monitoring Suppl (TRUE METRIX METER) w/Device KIT use as directed ONCE A WEEK  0    RA ACETAMINOPHEN 325 MG tablet take 1 tablet by mouth every 4 hours if needed for **MILD** PAIN/...  (REFER TO PRESCRIPTION NOTES). 0        Medications patient taking as of now reconciled against medications ordered at time of hospital discharge: Yes    Chief Complaint   Patient presents with    Other     Nursing home follow up    Flu Vaccine       HPI    Inpatient course: Discharge summary reviewed- see chart. Interval history/Current status: Patient hospitalized for Covid gastrointestinal.   was then discharged to skilled nursing facility.  bowels have resolved. Requesting Percocet to be given mostly at nighttime for her neuropathy. Denies any chest pain. Denies any other associated complaints. Medication list updated. Patient  has been using her Metformin at 1 daily. Review of Systems   Constitutional: Negative for chills and fever. HENT: Negative for rhinorrhea and sore throat. Eyes: Negative for discharge and redness. Respiratory: Negative for cough, shortness of breath and wheezing. Cardiovascular: Negative for chest pain and palpitations. Gastrointestinal: Negative for abdominal pain, diarrhea, nausea and vomiting. Genitourinary: Negative for dysuria and frequency. Musculoskeletal: Positive for back pain. Negative for arthralgias and myalgias. Neurological: Negative for dizziness, light-headedness and headaches. Psychiatric/Behavioral: Negative for sleep disturbance. Vitals:    11/24/20 1426   BP: 128/74   Pulse: 86   Temp: 97 °F (36.1 °C)   SpO2: 96%     There is no height or weight on file to calculate BMI. Wt Readings from Last 3 Encounters:   10/07/20 210 lb (95.3 kg)     BP Readings from Last 3 Encounters:   11/24/20 128/74   10/07/20 (!) 143/83   08/04/20 124/78       Physical Exam  Vitals signs and nursing note reviewed. Constitutional:       General: She is not in acute distress. Appearance: She is well-developed. She is not ill-appearing. HENT:      Head: Normocephalic and atraumatic. Right Ear: External ear normal.      Left Ear: External ear normal.   Eyes:      General: No scleral icterus. Right eye: No discharge. Left eye: No discharge. Conjunctiva/sclera: Conjunctivae normal.      Pupils: Pupils are equal, round, and reactive to light. Neck:      Thyroid: No thyromegaly. Trachea: No tracheal deviation. Cardiovascular:      Rate and Rhythm: Normal rate and regular rhythm. Heart sounds: Normal heart sounds. Pulmonary:      Effort: Pulmonary effort is normal. No respiratory distress. Breath sounds: Normal breath sounds. No wheezing. Lymphadenopathy:      Cervical: No cervical adenopathy. Skin:     General: Skin is warm. Findings: No rash. Neurological:      Mental Status: She is alert and oriented to person, place, and time. Psychiatric:         Mood and Affect: Mood normal.         Behavior: Behavior normal.         Thought Content: Thought content normal.             Assessment/Plan:  1. Need for vaccination  Flu shot today. - INFLUENZA, QUADV, 0.5ML, 6 MO AND OLDER, IM, PF, PREFILL SYR OR SDV (FLUZONE QUADV, PF)    2. Pneumonia due to COVID-19 virus  Resolved. No further treatment  - SC DISCHARGE MEDS RECONCILED W/ CURRENT OUTPATIENT MED LIST    3. Transverse myelitis ([address]  Renew Percocet  - oxyCODONE-acetaminophen (PERCOCET) 5-325 MG per tablet;  Take 1 tablet by mouth daily as needed for Pain for up to 30 days. Dispense: 30 tablet; Refill: 0    4. Paraplegia, complete (HCC)  Stable. No changes.         Medical Decision Making: moderate complexity

## 2020-11-25 ENCOUNTER — TELEPHONE (OUTPATIENT)
Dept: PRIMARY CARE CLINIC | Age: 65
End: 2020-11-25

## 2020-12-09 ENCOUNTER — TELEPHONE (OUTPATIENT)
Dept: PRIMARY CARE CLINIC | Age: 65
End: 2020-12-09

## 2020-12-09 RX ORDER — CEPHALEXIN 500 MG/1
500 CAPSULE ORAL 4 TIMES DAILY
Qty: 20 CAPSULE | Refills: 0 | Status: SHIPPED | OUTPATIENT
Start: 2020-12-09 | End: 2020-12-14

## 2020-12-09 NOTE — TELEPHONE ENCOUNTER
Pt has strong odor to her urine x3 days and frequency. Pharm [facility] [address]  Pt states she is unable to come in due to lack of transportation

## 2020-12-14 ENCOUNTER — TELEPHONE (OUTPATIENT)
Dept: PRIMARY CARE CLINIC | Age: 65
End: 2020-12-14

## 2020-12-14 NOTE — TELEPHONE ENCOUNTER
[facility] received a call from:    Name of Caller: Adry Smith    Relationship to patient: Healthcare Rep    Organization name: [facility] contact number: [phone]    Reason for call: Adry Smith is calling because patient needs orders for new leg braces for both legs.  Please advise 88

## 2020-12-22 RX ORDER — CYCLOBENZAPRINE HCL 10 MG
10 TABLET ORAL 2 TIMES DAILY
Qty: 60 TABLET | Refills: 5 | Status: SHIPPED | OUTPATIENT
Start: 2020-12-22 | End: 2021-12-02

## 2020-12-28 ENCOUNTER — TELEPHONE (OUTPATIENT)
Dept: PRIMARY CARE CLINIC | Age: 65
End: 2020-12-28

## 2020-12-28 NOTE — TELEPHONE ENCOUNTER
[address] calling to request to have the pt's demographic sheet faxed to her at [phone]  Faxed/scanned

## 2021-01-05 DIAGNOSIS — G37.3 TRANSVERSE MYELITIS (HCC): ICD-10-CM

## 2021-01-05 RX ORDER — OXYCODONE HYDROCHLORIDE AND ACETAMINOPHEN 5; 325 MG/1; MG/1
1 TABLET ORAL DAILY PRN
Qty: 30 TABLET | Refills: 0 | Status: SHIPPED | OUTPATIENT
Start: 2021-01-05 | End: 2021-02-15 | Stop reason: SDUPTHER

## 2021-01-06 DIAGNOSIS — I10 ESSENTIAL HYPERTENSION: ICD-10-CM

## 2021-01-06 DIAGNOSIS — K21.9 GASTROESOPHAGEAL REFLUX DISEASE WITHOUT ESOPHAGITIS: ICD-10-CM

## 2021-01-06 DIAGNOSIS — E11.9 CONTROLLED TYPE 2 DIABETES MELLITUS WITHOUT COMPLICATION, WITHOUT LONG-TERM CURRENT USE OF INSULIN (HCC): ICD-10-CM

## 2021-01-06 RX ORDER — NYSTATIN 100000 [USP'U]/G
POWDER TOPICAL
Qty: 60 G | Refills: 0 | Status: SHIPPED | OUTPATIENT
Start: 2021-01-06 | End: 2021-01-28

## 2021-01-06 RX ORDER — PYRIDOSTIGMINE BROMIDE 60 MG/1
TABLET ORAL
Qty: 28 TABLET | Refills: 0 | Status: SHIPPED | OUTPATIENT
Start: 2021-01-06 | End: 2021-01-28

## 2021-01-06 RX ORDER — DULOXETIN HYDROCHLORIDE 60 MG/1
CAPSULE, DELAYED RELEASE ORAL
Qty: 28 CAPSULE | Refills: 0 | Status: SHIPPED | OUTPATIENT
Start: 2021-01-06 | End: 2021-01-28

## 2021-01-06 RX ORDER — OMEPRAZOLE 20 MG/1
CAPSULE, DELAYED RELEASE ORAL
Qty: 28 CAPSULE | Refills: 0 | Status: SHIPPED | OUTPATIENT
Start: 2021-01-06 | End: 2021-01-28

## 2021-01-06 RX ORDER — LOSARTAN POTASSIUM 25 MG/1
TABLET ORAL
Qty: 28 TABLET | Refills: 0 | Status: SHIPPED | OUTPATIENT
Start: 2021-01-06 | End: 2021-01-28

## 2021-01-06 RX ORDER — ATORVASTATIN CALCIUM 20 MG/1
TABLET, FILM COATED ORAL
Qty: 28 TABLET | Refills: 0 | Status: SHIPPED | OUTPATIENT
Start: 2021-01-06 | End: 2021-01-28

## 2021-01-08 RX ORDER — FUROSEMIDE 40 MG/1
TABLET ORAL
Qty: 90 TABLET | Refills: 1 | Status: SHIPPED | OUTPATIENT
Start: 2021-01-08 | End: 2021-06-17

## 2021-01-28 DIAGNOSIS — I10 ESSENTIAL HYPERTENSION: ICD-10-CM

## 2021-01-28 DIAGNOSIS — K21.9 GASTROESOPHAGEAL REFLUX DISEASE WITHOUT ESOPHAGITIS: ICD-10-CM

## 2021-01-28 DIAGNOSIS — E11.9 CONTROLLED TYPE 2 DIABETES MELLITUS WITHOUT COMPLICATION, WITHOUT LONG-TERM CURRENT USE OF INSULIN (HCC): ICD-10-CM

## 2021-01-28 RX ORDER — NYSTATIN 100000 [USP'U]/G
POWDER TOPICAL
Qty: 60 G | Refills: 0 | Status: SHIPPED | OUTPATIENT
Start: 2021-01-28 | End: 2021-02-25

## 2021-01-28 RX ORDER — OMEPRAZOLE 20 MG/1
CAPSULE, DELAYED RELEASE ORAL
Qty: 28 CAPSULE | Refills: 0 | Status: SHIPPED | OUTPATIENT
Start: 2021-01-28 | End: 2021-02-25

## 2021-01-28 RX ORDER — LOSARTAN POTASSIUM 25 MG/1
TABLET ORAL
Qty: 28 TABLET | Refills: 0 | Status: SHIPPED | OUTPATIENT
Start: 2021-01-28 | End: 2021-02-25

## 2021-01-28 RX ORDER — ATORVASTATIN CALCIUM 20 MG/1
TABLET, FILM COATED ORAL
Qty: 28 TABLET | Refills: 0 | Status: SHIPPED | OUTPATIENT
Start: 2021-01-28 | End: 2021-02-25

## 2021-01-28 RX ORDER — DULOXETIN HYDROCHLORIDE 60 MG/1
CAPSULE, DELAYED RELEASE ORAL
Qty: 28 CAPSULE | Refills: 0 | Status: SHIPPED | OUTPATIENT
Start: 2021-01-28 | End: 2021-02-25

## 2021-01-28 RX ORDER — PYRIDOSTIGMINE BROMIDE 60 MG/1
TABLET ORAL
Qty: 28 TABLET | Refills: 0 | Status: SHIPPED | OUTPATIENT
Start: 2021-01-28 | End: 2021-02-25

## 2021-02-15 ENCOUNTER — TELEPHONE (OUTPATIENT)
Dept: PRIMARY CARE CLINIC | Age: 66
End: 2021-02-15

## 2021-02-15 DIAGNOSIS — G37.3 TRANSVERSE MYELITIS (HCC): ICD-10-CM

## 2021-02-15 RX ORDER — OXYCODONE HYDROCHLORIDE AND ACETAMINOPHEN 5; 325 MG/1; MG/1
1 TABLET ORAL DAILY PRN
Qty: 30 TABLET | Refills: 0 | Status: SHIPPED | OUTPATIENT
Start: 2021-02-15 | End: 2021-03-22 | Stop reason: SDUPTHER

## 2021-02-15 NOTE — TELEPHONE ENCOUNTER
Patient is requesting her pain medication be refilled at [facility] # [phone]  # [phone]  The name she stated was not listed so I could not enter it. Patient can be reached at [phone]. Okay to leave a message.  Last OV 11/21/2020    next OV 02/24/2021

## 2021-02-23 ENCOUNTER — TELEPHONE (OUTPATIENT)
Dept: PRIMARY CARE CLINIC | Age: 66
End: 2021-02-23

## 2021-02-23 NOTE — TELEPHONE ENCOUNTER
Sandra  with Addy Arcos. Nothing new to report on pt. Pt has appt with Dr. Adrianna Arnold on 2/24/21.  Heath Nesbitt  [phone]

## 2021-02-24 ENCOUNTER — OFFICE VISIT (OUTPATIENT)
Dept: PRIMARY CARE CLINIC | Age: 66
End: 2021-02-24
Payer: COMMERCIAL

## 2021-02-24 VITALS
HEIGHT: 63 IN | DIASTOLIC BLOOD PRESSURE: 74 MMHG | BODY MASS INDEX: 37.2 KG/M2 | TEMPERATURE: 97.1 F | SYSTOLIC BLOOD PRESSURE: 128 MMHG | HEART RATE: 87 BPM | OXYGEN SATURATION: 98 %

## 2021-02-24 DIAGNOSIS — R23.4 WOUND ESCHAR OF FOOT: ICD-10-CM

## 2021-02-24 DIAGNOSIS — E11.9 CONTROLLED TYPE 2 DIABETES MELLITUS WITHOUT COMPLICATION, WITHOUT LONG-TERM CURRENT USE OF INSULIN (HCC): Primary | ICD-10-CM

## 2021-02-24 LAB — HBA1C MFR BLD: 5.6 %

## 2021-02-24 PROCEDURE — 83036 HEMOGLOBIN GLYCOSYLATED A1C: CPT | Performed by: FAMILY MEDICINE

## 2021-02-24 PROCEDURE — 99213 OFFICE O/P EST LOW 20 MIN: CPT | Performed by: FAMILY MEDICINE

## 2021-02-24 RX ORDER — OMEPRAZOLE 20 MG/1
20 TABLET, DELAYED RELEASE ORAL
COMMUNITY
Start: 2020-11-03 | End: 2022-04-19

## 2021-02-24 RX ORDER — OXYBUTYNIN CHLORIDE 10 MG/1
TABLET, EXTENDED RELEASE ORAL
COMMUNITY
Start: 2021-02-04

## 2021-02-24 SDOH — ECONOMIC STABILITY: FOOD INSECURITY: WITHIN THE PAST 12 MONTHS, YOU WORRIED THAT YOUR FOOD WOULD RUN OUT BEFORE YOU GOT MONEY TO BUY MORE.: NEVER TRUE

## 2021-02-24 SDOH — ECONOMIC STABILITY: TRANSPORTATION INSECURITY
IN THE PAST 12 MONTHS, HAS LACK OF TRANSPORTATION KEPT YOU FROM MEETINGS, WORK, OR FROM GETTING THINGS NEEDED FOR DAILY LIVING?: NO

## 2021-02-24 SDOH — ECONOMIC STABILITY: TRANSPORTATION INSECURITY
IN THE PAST 12 MONTHS, HAS THE LACK OF TRANSPORTATION KEPT YOU FROM MEDICAL APPOINTMENTS OR FROM GETTING MEDICATIONS?: NO

## 2021-02-24 ASSESSMENT — ENCOUNTER SYMPTOMS
EYE DISCHARGE: 0
SHORTNESS OF BREATH: 0
ABDOMINAL PAIN: 0
VOMITING: 0
COUGH: 0
DIARRHEA: 0
SORE THROAT: 0
NAUSEA: 0
RHINORRHEA: 0
WHEEZING: 0
EYE REDNESS: 0

## 2021-02-24 ASSESSMENT — PATIENT HEALTH QUESTIONNAIRE - PHQ9
SUM OF ALL RESPONSES TO PHQ QUESTIONS 1-9: 0
2. FEELING DOWN, DEPRESSED OR HOPELESS: 0
1. LITTLE INTEREST OR PLEASURE IN DOING THINGS: 0

## 2021-02-24 NOTE — PROGRESS NOTES
[facility]  [address]  [facility] [address]  Dept: [phone]    Manuel Justice is a 61 y.o. female Established patient, who presents today for her medical conditions/complaintsas noted below. Chief Complaint   Patient presents with    3 Month Follow-Up     Diabetes       HPI:     HPI   Patient here today for 3 month follow up. She has a history of diabetes for which she takes metformin. She reports that she tolerates the medication well. She does not regularly check her blood sugars at home. Patient denies low blood sugar reactions. She denies chest pain, shortness of breath, lightheadedness, or dizziness. Patient has a history of hypertension and hyperlipidemia for which she takes atorvastatin, metoprolol, and losartan. She is tolerating the medications well and denies any side effects. She doesn't check her BP regularly at home. Patient states that she normally has AFOs for her shoes. She is in the process of getting new shoes fitted so she doesn't have to wear them. She hopes to have them pretty soon. On the side of her left foot she reports that she has a sore. Sore has been there for the past 3-4 months. She is unsure if the area is getting worse. She thinks it has to do with the AFO and friction.      Reviewed prior notes None  Reviewed previous Labs    LDL Calculated (mg/dL)   Date Value   02/24/2020 117   12/31/2018 75   02/09/2018 135       (goal LDL is <100)   AST (U/L)   Date Value   10/07/2020 16     ALT (U/L)   Date Value   10/07/2020 12     BUN (mg/dL)   Date Value   10/07/2020 19     Hemoglobin A1C (%)   Date Value   02/24/2021 5.6     BP Readings from Last 3 Encounters:   02/24/21 128/74   11/24/20 128/74   10/07/20 (!) 143/83          (goal 120/80)    Past Medical History:   Diagnosis Date    Acute transverse myelitis in demyelinating disease of central nervous system ([facility] [address]     Atrial fibrillation ([facility] [address] [facility]     Depression     Hyperlipidemia     Hypertension       Past Surgical History:   Procedure Laterality Date    APPENDECTOMY  1988    CHOLECYSTECTOMY, OPEN  48    FEMUR FRACTURE SURGERY Left 05/2018    HYSTERECTOMY, TOTAL ABDOMINAL  1988       No family history on file. Social History     Tobacco Use    Smoking status: Never Smoker    Smokeless tobacco: Never Used   Substance Use Topics    Alcohol use: No      Current Outpatient Medications   Medication Sig Dispense Refill    omeprazole (PRILOSEC OTC) 20 MG tablet Take 20 mg by mouth every morning (before breakfast)      metFORMIN (GLUCOPHAGE) 500 MG tablet Take 1 tablet by mouth daily (with breakfast) 90 tablet 3    oxyCODONE-acetaminophen (PERCOCET) 5-325 MG per tablet Take 1 tablet by mouth daily as needed for Pain for up to 30 days. 30 tablet 0    omeprazole (PRILOSEC) 20 MG delayed release capsule TAKE (1) CAPSULE EVERY MORNING BEFORE BREAKFAST 28 capsule 0    nystatin (NYSTATIN) 008756 UNIT/GM powder APPLY TOPICALLY 3 TO 4 TIMES DAILY 60 g 0    atorvastatin (LIPITOR) 20 MG tablet TAKE (1) TABLET IN THE EVENING. 28 tablet 0    rivaroxaban (XARELTO) 20 MG TABS tablet TAKE (1) TABLET IN THE EVENING.  28 tablet 0    metoprolol tartrate (LOPRESSOR) 25 MG tablet TAKE (1) TABLET EVERY MORNING AND EVENING 56 tablet 0    pyridostigmine (MESTINON) 60 MG tablet TAKE (1) TABLET EVERY MORNING 28 tablet 0    losartan (COZAAR) 25 MG tablet TAKE (1) TABLET EVERY MORNING 28 tablet 0    DULoxetine (CYMBALTA) 60 MG extended release capsule TAKE (1) CAPSULE EVERY MORNING 28 capsule 0    furosemide (LASIX) 40 MG tablet TAKE (1) TABLET EVERY MORNING 90 tablet 1    cyclobenzaprine (FLEXERIL) 10 MG tablet Take 1 tablet by mouth 2 times daily 60 tablet 5    potassium chloride (KLOR-CON) 10 MEQ extended release tablet TAKE (1) TABLET EVERY MORNING 90 tablet 3    baclofen (LIORESAL) 10 MG tablet Take 1 tablet by mouth 3 times daily 90 tablet 5    ciclopirox (LOPROX) 0.77 % cream Apply topically 2 times daily. 90 g 1    promethazine (PHENERGAN) 25 MG tablet TAKE (1) TABLET FOUR TIMES A DAY AS NEEDED FOR NAUSEA 20 tablet 0    Multiple Vitamin (MULTIVITAMIN) tablet TAKE 1 TABLET ONCE DAILY 28 tablet 3    Multiple Vitamins-Minerals (RA CENTRAL-HANNA SENIOR) TABS take 1 tablet by mouth once daily for PREVENTATIVE  0    RA HEMORRHOIDAL 1-0.25-14.4-15 % CREA rectal cream apply TO RECTUM AREA once daily if needed for HEMMORROIDS  0    TRUEPLUS LANCETS 30G MISC use as directed TESTING ONCE A WEEK  0    hydrocortisone 2.5 % cream apply to RIGHT HAND AND ARM three times a day if needed for itching  0    TRUE METRIX BLOOD GLUCOSE TEST strip TEST ONCE A WEEK  0    RA ALLERGY 25 MG tablet take 1 tablet by mouth three times a day if needed for itching  0    Blood Glucose Monitoring Suppl (TRUE METRIX METER) w/Device KIT use as directed ONCE A WEEK  0    RA ACETAMINOPHEN 325 MG tablet take 1 tablet by mouth every 4 hours if needed for **MILD** PAIN/...  (REFER TO PRESCRIPTION NOTES). 0    oxybutynin (DITROPAN-XL) 10 MG extended release tablet        No current facility-administered medications for this visit. Allergies   Allergen Reactions    Compazine [Prochlorperazine]      Cause neurological sx    Sulfa Antibiotics      Childhood allergy       Health Maintenance   Topic Date Due    Shingles Vaccine (1 of 2) 02/14/2007    Colon cancer screen colonoscopy  02/14/2007    Potassium monitoring  10/07/2021    Creatinine monitoring  10/07/2021    A1C test (Diabetic or Prediabetic)  02/24/2022    Breast cancer screen  08/27/2022    DTaP/Tdap/Td vaccine (3 - Td) 09/20/2028    Flu vaccine  Completed    Pneumococcal 0-64 years Vaccine  Completed    Hepatitis A vaccine  Aged Out    Hib vaccine  Aged Out    Meningococcal (ACWY) vaccine  Aged Out       Subjective:      Review of Systems   Constitutional: Negative for chills and fever. HENT: Negative for rhinorrhea and sore throat. Eyes: Negative for discharge and redness. Respiratory: Negative for cough, shortness of breath and wheezing. Cardiovascular: Negative for chest pain and palpitations. Gastrointestinal: Negative for abdominal pain, diarrhea, nausea and vomiting. Genitourinary: Negative for dysuria and frequency. Musculoskeletal: Negative for arthralgias and myalgias. Skin: Positive for wound. Neurological: Negative for dizziness, light-headedness and headaches. Psychiatric/Behavioral: Negative for sleep disturbance. Objective:     /74   Pulse 87   Temp 97.1 °F (36.2 °C)   Ht 5' 3\" (1.6 m)   SpO2 98%   BMI 37.20 kg/m²   Physical Exam  Vitals signs and nursing note reviewed. Constitutional:       General: She is not in acute distress. Appearance: She is well-developed. She is not ill-appearing. HENT:      Head: Normocephalic and atraumatic. Right Ear: External ear normal.      Left Ear: External ear normal.   Eyes:      General: No scleral icterus. Right eye: No discharge. Left eye: No discharge. Conjunctiva/sclera: Conjunctivae normal.      Pupils: Pupils are equal, round, and reactive to light. Neck:      Thyroid: No thyromegaly. Trachea: No tracheal deviation. Cardiovascular:      Rate and Rhythm: Normal rate and regular rhythm. Heart sounds: Normal heart sounds. Pulmonary:      Effort: Pulmonary effort is normal. No respiratory distress. Breath sounds: Normal breath sounds. No wheezing. Lymphadenopathy:      Cervical: No cervical adenopathy. Skin:     General: Skin is warm. Findings: No rash. Comments: 1 cm lesion to the lateral plantar aspect of the left foot just posterior to the left 5th toe   Neurological:      Mental Status: She is alert and oriented to person, place, and time. Psychiatric:         Mood and Affect: Mood normal.         Behavior: Behavior normal.         Thought Content:  Thought content normal. Assessment:       Diagnosis Orders   1. Controlled type 2 diabetes mellitus without complication, without long-term current use of insulin (HCC)  DE COLLECTION CAPILLARY BLOOD SPECIMEN    POCT glycosylated hemoglobin (Hb A1C)   2. Wound eschar of foot  Gerda Miles DPM, Cher Copeland        Plan:    refer to Podiatry for wound on left foot  Decrease metformin to daily    Return in about 3 months (around 5/24/2021). Orders Placed This Encounter   Procedures   Roberto Cunningham DPM, PodCher dietz     Referral Priority:   Routine     Referral Type:   Eval and Treat     Referral Reason:   Specialty Services Required     Referred to Provider:   Dougie Kidd DPM     Requested Specialty:   Podiatry     Number of Visits Requested:   1    POCT glycosylated hemoglobin (Hb A1C)    DE COLLECTION CAPILLARY BLOOD SPECIMEN     Orders Placed This Encounter   Medications    metFORMIN (GLUCOPHAGE) 500 MG tablet     Sig: Take 1 tablet by mouth daily (with breakfast)     Dispense:  90 tablet     Refill:  3       Patient given educationalmaterials - see patient instructions. Discussed use, benefit, and side effectsof prescribed medications. All patient questions answered. Pt voiced understanding. Reviewed health maintenance. Instructed to continue current medications, diet andexercise. Patient agreed with treatment plan. Follow up as directed.      Electronicallysigned by Mirela Carpenter MD on 2/24/2021 at 12:06 PM

## 2021-02-25 DIAGNOSIS — I10 ESSENTIAL HYPERTENSION: ICD-10-CM

## 2021-02-25 DIAGNOSIS — K21.9 GASTROESOPHAGEAL REFLUX DISEASE WITHOUT ESOPHAGITIS: ICD-10-CM

## 2021-02-25 DIAGNOSIS — E11.9 CONTROLLED TYPE 2 DIABETES MELLITUS WITHOUT COMPLICATION, WITHOUT LONG-TERM CURRENT USE OF INSULIN (HCC): ICD-10-CM

## 2021-02-25 RX ORDER — NYSTATIN 100000 [USP'U]/G
POWDER TOPICAL
Qty: 60 G | Refills: 0 | Status: SHIPPED | OUTPATIENT
Start: 2021-02-25 | End: 2021-08-16

## 2021-02-25 RX ORDER — ATORVASTATIN CALCIUM 20 MG/1
TABLET, FILM COATED ORAL
Qty: 28 TABLET | Refills: 0 | Status: SHIPPED | OUTPATIENT
Start: 2021-02-25 | End: 2021-03-11 | Stop reason: SDUPTHER

## 2021-02-25 RX ORDER — PYRIDOSTIGMINE BROMIDE 60 MG/1
TABLET ORAL
Qty: 28 TABLET | Refills: 0 | Status: SHIPPED | OUTPATIENT
Start: 2021-02-25 | End: 2021-03-25

## 2021-02-25 RX ORDER — LOSARTAN POTASSIUM 25 MG/1
TABLET ORAL
Qty: 28 TABLET | Refills: 0 | Status: SHIPPED | OUTPATIENT
Start: 2021-02-25 | End: 2021-03-11 | Stop reason: SDUPTHER

## 2021-02-25 RX ORDER — DULOXETIN HYDROCHLORIDE 60 MG/1
CAPSULE, DELAYED RELEASE ORAL
Qty: 28 CAPSULE | Refills: 0 | Status: SHIPPED | OUTPATIENT
Start: 2021-02-25 | End: 2021-03-25

## 2021-02-25 RX ORDER — OMEPRAZOLE 20 MG/1
CAPSULE, DELAYED RELEASE ORAL
Qty: 28 CAPSULE | Refills: 0 | Status: SHIPPED | OUTPATIENT
Start: 2021-02-25 | End: 2021-03-31

## 2021-03-11 ENCOUNTER — TELEPHONE (OUTPATIENT)
Dept: PRIMARY CARE CLINIC | Age: 66
End: 2021-03-11

## 2021-03-11 DIAGNOSIS — E11.9 CONTROLLED TYPE 2 DIABETES MELLITUS WITHOUT COMPLICATION, WITHOUT LONG-TERM CURRENT USE OF INSULIN (HCC): ICD-10-CM

## 2021-03-11 DIAGNOSIS — I10 ESSENTIAL HYPERTENSION: ICD-10-CM

## 2021-03-11 RX ORDER — CEPHALEXIN 500 MG/1
500 CAPSULE ORAL 4 TIMES DAILY
Qty: 20 CAPSULE | Refills: 0 | Status: SHIPPED | OUTPATIENT
Start: 2021-03-11 | End: 2021-03-25

## 2021-03-12 RX ORDER — LOSARTAN POTASSIUM 25 MG/1
25 TABLET ORAL DAILY
Qty: 28 TABLET | Refills: 11 | Status: SHIPPED | OUTPATIENT
Start: 2021-03-12 | End: 2022-03-02

## 2021-03-12 RX ORDER — ATORVASTATIN CALCIUM 20 MG/1
20 TABLET, FILM COATED ORAL DAILY
Qty: 28 TABLET | Refills: 11 | Status: SHIPPED | OUTPATIENT
Start: 2021-03-12 | End: 2022-03-02

## 2021-03-15 NOTE — TELEPHONE ENCOUNTER
Noted losartan and atorvastatin refills approved, thank you!    =======================================================   For Pharmacy Admin Tracking Only    PHSO: Yes  Total # of Interventions Recommended: 2  - Refills Provided #: 2  Recommended intervention potential cost savings: 1  Total Interventions Accepted: 2  Time Spent (min): 15
Renetta Duckworth MD, patient out of refills. Rx pended for your signature/modification as appropriate    LOV: 2/24/21  Next: 5/26/21    Thank you,  Robert Ascencio, PharmD, [place]  Direct: [phone]  Department, toll free: [phone], option 7     =======================================================   Reviewed and agree with PharmD candidate below.
filled on 03/04/2021 for 28 day supply. 0 refills remaining. Billed through [facility]   Component Value Date    CHOL 207 02/09/2018    TRIG 150 02/09/2018    HDL 50 02/24/2020    LDLCALC 117 02/24/2020     ALT   Date Value Ref Range Status   10/07/2020 12 5 - 33 U/L Final     AST   Date Value Ref Range Status   10/07/2020 16 <32 U/L Final     The 10-year ASCVD risk score (Marlene Saleh, et al., 2013) is: 12.6%*    Values used to calculate the score:      Age: 61 years      Sex: Female      Is Non- : No      Diabetic: Yes      Tobacco smoker: No      Systolic Blood Pressure: 809 mmHg      Is BP treated: Yes      HDL Cholesterol: 50 mg/dL      Total Cholesterol: 184 mg/dL*      * - Cholesterol units were assumed for this score calculation     PLAN  The following are interventions that have been identified:   Per pharmacy- patient is getting pre-packaged medications therefore patient is not eligible for a 90 DS    No patient out reach planned at this time. - Patient follows closely with PCP and regularly has office visits   - Per pharmacy: they follow closely with patient as well. Pharmacy states they are pretty good at faxing Dr. Willian Kingston ( patient's PCP) for refills when needed.   - Patient is out of refills for her atorvastatin and Losartan- current fill will not make it to her next appt with PCP on 05/26/2021    Leon Bahena, PharmD Candidate of [address] [place], [place] [address]  [facility], toll free: [phone], option 7

## 2021-03-22 DIAGNOSIS — G37.3 TRANSVERSE MYELITIS (HCC): ICD-10-CM

## 2021-03-22 RX ORDER — OXYCODONE HYDROCHLORIDE AND ACETAMINOPHEN 5; 325 MG/1; MG/1
1 TABLET ORAL DAILY PRN
Qty: 30 TABLET | Refills: 0 | Status: SHIPPED | OUTPATIENT
Start: 2021-03-22 | End: 2021-04-27 | Stop reason: SDUPTHER

## 2021-03-25 RX ORDER — CEPHALEXIN 500 MG/1
500 CAPSULE ORAL 4 TIMES DAILY
Qty: 20 CAPSULE | Refills: 0 | Status: SHIPPED | OUTPATIENT
Start: 2021-03-25 | End: 2021-08-26

## 2021-03-25 RX ORDER — PYRIDOSTIGMINE BROMIDE 60 MG/1
TABLET ORAL
Qty: 28 TABLET | Refills: 0 | Status: SHIPPED | OUTPATIENT
Start: 2021-03-25 | End: 2021-04-23

## 2021-03-25 RX ORDER — DULOXETIN HYDROCHLORIDE 60 MG/1
CAPSULE, DELAYED RELEASE ORAL
Qty: 28 CAPSULE | Refills: 0 | Status: SHIPPED | OUTPATIENT
Start: 2021-03-25 | End: 2021-04-23

## 2021-03-31 DIAGNOSIS — K21.9 GASTROESOPHAGEAL REFLUX DISEASE WITHOUT ESOPHAGITIS: ICD-10-CM

## 2021-03-31 RX ORDER — OMEPRAZOLE 20 MG/1
CAPSULE, DELAYED RELEASE ORAL
Qty: 28 CAPSULE | Refills: 0 | Status: SHIPPED | OUTPATIENT
Start: 2021-03-31 | End: 2021-04-23

## 2021-04-07 ENCOUNTER — TELEPHONE (OUTPATIENT)
Dept: PRIMARY CARE CLINIC | Age: 66
End: 2021-04-07

## 2021-04-07 RX ORDER — AMOXICILLIN 500 MG/1
500 CAPSULE ORAL 3 TIMES DAILY
Qty: 21 CAPSULE | Refills: 0 | Status: SHIPPED | OUTPATIENT
Start: 2021-04-07 | End: 2021-04-14

## 2021-04-07 NOTE — TELEPHONE ENCOUNTER
Pt calling with c/o UTI sx. Pt states she has urinary urgency, burning and bad odor x1-2 weeks. I offered the patient a appt but she needs transportation and it takes 3 days for the request to be approved. Pt wants to know if something can be sent in? [facility].

## 2021-04-22 DIAGNOSIS — K21.9 GASTROESOPHAGEAL REFLUX DISEASE WITHOUT ESOPHAGITIS: ICD-10-CM

## 2021-04-23 RX ORDER — OMEPRAZOLE 20 MG/1
CAPSULE, DELAYED RELEASE ORAL
Qty: 28 CAPSULE | Refills: 0 | Status: SHIPPED | OUTPATIENT
Start: 2021-04-23 | End: 2021-05-19

## 2021-04-23 RX ORDER — DULOXETIN HYDROCHLORIDE 60 MG/1
CAPSULE, DELAYED RELEASE ORAL
Qty: 28 CAPSULE | Refills: 0 | Status: SHIPPED | OUTPATIENT
Start: 2021-04-23 | End: 2021-05-19

## 2021-04-23 RX ORDER — PYRIDOSTIGMINE BROMIDE 60 MG/1
TABLET ORAL
Qty: 28 TABLET | Refills: 0 | Status: SHIPPED | OUTPATIENT
Start: 2021-04-23 | End: 2021-05-19

## 2021-04-27 DIAGNOSIS — G37.3 TRANSVERSE MYELITIS (HCC): ICD-10-CM

## 2021-04-27 RX ORDER — OXYCODONE HYDROCHLORIDE AND ACETAMINOPHEN 5; 325 MG/1; MG/1
1 TABLET ORAL DAILY PRN
Qty: 30 TABLET | Refills: 0 | Status: SHIPPED | OUTPATIENT
Start: 2021-04-27 | End: 2021-05-21

## 2021-05-05 ENCOUNTER — OFFICE VISIT (OUTPATIENT)
Dept: PODIATRY | Age: 66
End: 2021-05-05
Payer: MEDICARE

## 2021-05-05 VITALS — HEIGHT: 63 IN | BODY MASS INDEX: 35.44 KG/M2 | WEIGHT: 200 LBS

## 2021-05-05 DIAGNOSIS — G82.21 PARAPLEGIA, COMPLETE (HCC): ICD-10-CM

## 2021-05-05 DIAGNOSIS — G37.3 TRANSVERSE MYELITIS (HCC): Primary | ICD-10-CM

## 2021-05-05 DIAGNOSIS — E11.9 CONTROLLED TYPE 2 DIABETES MELLITUS WITHOUT COMPLICATION, WITHOUT LONG-TERM CURRENT USE OF INSULIN (HCC): ICD-10-CM

## 2021-05-05 DIAGNOSIS — L97.521 ULCERATED, FOOT, LEFT, LIMITED TO BREAKDOWN OF SKIN (HCC): ICD-10-CM

## 2021-05-05 PROCEDURE — 3017F COLORECTAL CA SCREEN DOC REV: CPT | Performed by: PODIATRIST

## 2021-05-05 PROCEDURE — 2022F DILAT RTA XM EVC RTNOPTHY: CPT | Performed by: PODIATRIST

## 2021-05-05 PROCEDURE — 99204 OFFICE O/P NEW MOD 45 MIN: CPT | Performed by: PODIATRIST

## 2021-05-05 PROCEDURE — 1036F TOBACCO NON-USER: CPT | Performed by: PODIATRIST

## 2021-05-05 PROCEDURE — G8417 CALC BMI ABV UP PARAM F/U: HCPCS | Performed by: PODIATRIST

## 2021-05-05 PROCEDURE — 3044F HG A1C LEVEL LT 7.0%: CPT | Performed by: PODIATRIST

## 2021-05-05 PROCEDURE — G8427 DOCREV CUR MEDS BY ELIG CLIN: HCPCS | Performed by: PODIATRIST

## 2021-05-05 ASSESSMENT — ENCOUNTER SYMPTOMS
VOMITING: 0
DIARRHEA: 0
CONSTIPATION: 0
NAUSEA: 0
COLOR CHANGE: 0

## 2021-05-05 NOTE — PROGRESS NOTES
[facility]  New Patient History and Physical    Chief Complaint:   Chief Complaint   Patient presents with    Wound Check     left foot, AFO AND SHOES CAUSED IT     Other     last a1c 5.6       HPI: Fabiola Melo is a 61 y.o. female who presents to the office today complaining of spot on her left foot. She has transverse myelitis. Was in AFO's both feet, but feet are changing. The brace rubbed on her left foot, no pain. Getting new AFO's. Using a band aid for protection. She lives with her daughter. Currently denies F/C/N/V. Allergies   Allergen Reactions    Compazine [Prochlorperazine]      Cause neurological sx    Sulfa Antibiotics      Childhood allergy       Past Medical History:   Diagnosis Date    Acute transverse myelitis in demyelinating disease of central nervous system [facility]     Atrial fibrillation [facility] [facility]     Depression     Hyperlipidemia     Hypertension        Prior to Admission medications    Medication Sig Start Date End Date Taking? Authorizing Provider   oxyCODONE-acetaminophen (PERCOCET) 5-325 MG per tablet Take 1 tablet by mouth daily as needed for Pain for up to 30 days.  4/27/21 5/27/21 Yes Nani Calderon MD   omeprazole (PRILOSEC) 20 MG delayed release capsule TAKE (1) CAPSULE EVERY MORNING BEFORE BREAKFAST 4/23/21  Yes Nani Calderon MD   rivaroxaban (XARELTO) 20 MG TABS tablet TAKE (1) TABLET IN THE EVENING. 4/23/21  Yes Nani Calderon MD   DULoxetine (CYMBALTA) 60 MG extended release capsule TAKE (1) CAPSULE EVERY MORNING 4/23/21  Yes Nani Calderon MD   pyridostigmine (MESTINON) 60 MG tablet TAKE (1) TABLET EVERY MORNING 4/23/21  Yes Nani Calderon MD   metoprolol tartrate (LOPRESSOR) 25 MG tablet TAKE (1) TABLET EVERY MORNING AND EVENING 4/23/21  Yes Nani Calderon MD   cephALEXin (KEFLEX) 500 MG capsule TAKE 1 CAPSULE BY MOUTH 4 TIMES DAILY 3/25/21  Yes Nani Calderon MD   atorvastatin (LIPITOR) 20 MG tablet Take 1 tablet by mouth daily 3/12/21  Yes Rosa Hermosillo MD   losartan (COZAAR) 25 MG tablet Take 1 tablet by mouth daily 3/12/21  Yes Rosa Hermosillo MD   nystatin (NYSTATIN) 416962 UNIT/GM powder APPLY TOPICALLY 3 TO 4 TIMES DAILY 2/25/21  Yes Rosa Hermosillo MD   omeprazole (PRILOSEC OTC) 20 MG tablet Take 20 mg by mouth every morning (before breakfast) 11/3/20  Yes Historical Provider, MD   oxybutynin (DITROPAN-XL) 10 MG extended release tablet  2/4/21  Yes Historical Provider, MD   metFORMIN (GLUCOPHAGE) 500 MG tablet Take 1 tablet by mouth daily (with breakfast) 2/24/21  Yes Rosa Hermosillo MD   furosemide (LASIX) 40 MG tablet TAKE (1) TABLET EVERY MORNING 1/8/21  Yes Rosa Hermosillo MD   cyclobenzaprine (FLEXERIL) 10 MG tablet Take 1 tablet by mouth 2 times daily 12/22/20  Yes Rosa Hermosillo MD   potassium chloride (KLOR-CON) 10 MEQ extended release tablet TAKE (1) TABLET EVERY MORNING 11/24/20  Yes Rosa Hermosillo MD   baclofen (LIORESAL) 10 MG tablet Take 1 tablet by mouth 3 times daily 9/9/20  Yes Rosa Hermosillo MD   ciclopirox (LOPROX) 0.77 % cream Apply topically 2 times daily.  8/4/20  Yes Rosa Hermosillo MD   promethazine (PHENERGAN) 25 MG tablet TAKE (1) TABLET FOUR TIMES A DAY AS NEEDED FOR NAUSEA 4/2/20  Yes LITA Murcia - CNP   Multiple Vitamin (MULTIVITAMIN) tablet TAKE 1 TABLET ONCE DAILY 4/30/18  Yes Rosa Hermosillo MD   Multiple Vitamins-Minerals (RA CENTRAL-HANNA SENIOR) TABS take 1 tablet by mouth once daily for PREVENTATIVE 12/4/17  Yes Historical Provider, MD MO HEMORRHOIDAL 1-0.25-14.4-15 % CREA rectal cream apply TO RECTUM AREA once daily if needed for HEMMORROIDS 12/4/17  Yes Historical Provider, MD   TRUEPLUS LANCETS 30G MISC use as directed TESTING ONCE A WEEK 12/5/17  Yes Historical Provider, MD   hydrocortisone 2.5 % cream apply to RIGHT HAND AND ARM three times a day if needed for itching 12/4/17  Yes Historical Provider, MD VASQUEZ METCHUNG BLOOD GLUCOSE TEST strip TEST ONCE A WEEK 12/5/17  Yes Historical Provider, MD MO ALLERGY 25 MG tablet take 1 tablet by mouth three times a day if needed for itching 12/4/17  Yes Historical Provider, MD   Blood Glucose Monitoring Suppl (TRUE METRIX METER) w/Device KIT use as directed ONCE A WEEK 12/5/17  Yes Historical Provider, MD MO ACETAMINOPHEN 325 MG tablet take 1 tablet by mouth every 4 hours if needed for **MILD** PAIN/...  (REFER TO PRESCRIPTION NOTES). 12/4/17  Yes Historical Provider, MD       Past Surgical History:   Procedure Laterality Date    APPENDECTOMY  1988    CHOLECYSTECTOMY, OPEN  48    FEMUR FRACTURE SURGERY Left 05/2018    HYSTERECTOMY, TOTAL ABDOMINAL  1988       No family history on file. Social History     Tobacco Use    Smoking status: Never Smoker    Smokeless tobacco: Never Used   Substance Use Topics    Alcohol use: No       Review of Systems   Constitutional: Negative for chills, diaphoresis, fatigue, fever and unexpected weight change. Cardiovascular: Negative for leg swelling. Gastrointestinal: Negative for constipation, diarrhea, nausea and vomiting. Musculoskeletal: Negative for arthralgias, gait problem and joint swelling. Skin: Positive for wound. Negative for color change, pallor and rash. Neurological: Positive for weakness and numbness. Lower Extremity Physical Examination:     Vitals: There were no vitals filed for this visit. General: AAO x 3 in NAD. Vascular: DP and PT pulses palpable 2/4, Bilateral.  CFT <3 seconds, Bilateral.  Hair growth absent to the level of the digits, Bilateral.  Edema present, Bilateral.  Varicosities absent, Bilateral. Erythema absent, Bilateral    Neurological:  Sensation absent to light touch to level of digits, Bilateral.    Musculoskeletal: ankle contracture bilateral, foot inverted.      Integument: Warm, dry, supple, Bilateral.  Dry eschar plantar left 5th metatarsal head, stable, no fluctuance, no pain, no erythema, no drainage. Gait analysis:     Asessment: Patient is a 61 y.o. female with:   1. Transverse myelitis ([address]    2. Ulcerated, foot, left, limited to breakdown of skin ([address]    3. Controlled type 2 diabetes mellitus without complication, without long-term current use of insulin ([facility])    4. Paraplegia, complete ([address]          Plan: Pt was evaluated and examined. Patient was given personalized discharge instructions. Pt will follow up in  As needed or sooner if any problems arise. Diagnosis was discussed with the pt and all of their questions were answered in detail. Proper foot hygiene and care was discussed with the pt. Patient to check feet daily and contact the office with any questions/problems/concerns. Other comorbidity noted and will be managed by PCP. The lesion was partially debrided to healthy pink skin. No bleeding was noted. No ulceration noted. The patient tolerated the procedure well and without complication. Apperature pad applied. Discussed with patient options for offloading pressure on the area. Obtain soft AFO's from     No orders of the defined types were placed in this encounter. No orders of the defined types were placed in this encounter. RTC in as needed.     5/5/2021

## 2021-05-19 DIAGNOSIS — K21.9 GASTROESOPHAGEAL REFLUX DISEASE WITHOUT ESOPHAGITIS: ICD-10-CM

## 2021-05-19 RX ORDER — DULOXETIN HYDROCHLORIDE 60 MG/1
CAPSULE, DELAYED RELEASE ORAL
Qty: 28 CAPSULE | Refills: 0 | Status: SHIPPED | OUTPATIENT
Start: 2021-05-19 | End: 2021-06-17

## 2021-05-19 RX ORDER — OMEPRAZOLE 20 MG/1
CAPSULE, DELAYED RELEASE ORAL
Qty: 28 CAPSULE | Refills: 0 | Status: SHIPPED | OUTPATIENT
Start: 2021-05-19 | End: 2021-06-17

## 2021-05-19 RX ORDER — PYRIDOSTIGMINE BROMIDE 60 MG/1
TABLET ORAL
Qty: 28 TABLET | Refills: 0 | Status: SHIPPED | OUTPATIENT
Start: 2021-05-19 | End: 2021-06-17

## 2021-05-20 DIAGNOSIS — G37.3 TRANSVERSE MYELITIS (HCC): ICD-10-CM

## 2021-05-21 RX ORDER — OXYCODONE HYDROCHLORIDE AND ACETAMINOPHEN 5; 325 MG/1; MG/1
TABLET ORAL
Qty: 30 TABLET | Refills: 0 | Status: SHIPPED | OUTPATIENT
Start: 2021-05-21 | End: 2021-05-26

## 2021-05-26 ENCOUNTER — TELEPHONE (OUTPATIENT)
Dept: PRIMARY CARE CLINIC | Age: 66
End: 2021-05-26

## 2021-05-26 ENCOUNTER — OFFICE VISIT (OUTPATIENT)
Dept: PRIMARY CARE CLINIC | Age: 66
End: 2021-05-26
Payer: MEDICARE

## 2021-05-26 VITALS — OXYGEN SATURATION: 94 % | DIASTOLIC BLOOD PRESSURE: 76 MMHG | SYSTOLIC BLOOD PRESSURE: 130 MMHG | HEART RATE: 97 BPM

## 2021-05-26 DIAGNOSIS — I48.20 CHRONIC ATRIAL FIBRILLATION (HCC): ICD-10-CM

## 2021-05-26 DIAGNOSIS — G82.21 PARAPLEGIA, COMPLETE (HCC): ICD-10-CM

## 2021-05-26 DIAGNOSIS — G37.3 TRANSVERSE MYELITIS (HCC): ICD-10-CM

## 2021-05-26 DIAGNOSIS — Z00.00 ROUTINE GENERAL MEDICAL EXAMINATION AT A HEALTH CARE FACILITY: ICD-10-CM

## 2021-05-26 DIAGNOSIS — E11.9 CONTROLLED TYPE 2 DIABETES MELLITUS WITHOUT COMPLICATION, WITHOUT LONG-TERM CURRENT USE OF INSULIN (HCC): Primary | ICD-10-CM

## 2021-05-26 PROBLEM — I49.8 SINUS ARRHYTHMIA SEEN ON ELECTROCARDIOGRAM: Status: RESOLVED | Noted: 2018-02-21 | Resolved: 2021-05-26

## 2021-05-26 LAB — HBA1C MFR BLD: 5.7 %

## 2021-05-26 PROCEDURE — G0439 PPPS, SUBSEQ VISIT: HCPCS | Performed by: FAMILY MEDICINE

## 2021-05-26 PROCEDURE — 3017F COLORECTAL CA SCREEN DOC REV: CPT | Performed by: FAMILY MEDICINE

## 2021-05-26 PROCEDURE — 83036 HEMOGLOBIN GLYCOSYLATED A1C: CPT | Performed by: FAMILY MEDICINE

## 2021-05-26 PROCEDURE — 3044F HG A1C LEVEL LT 7.0%: CPT | Performed by: FAMILY MEDICINE

## 2021-05-26 RX ORDER — OXYCODONE HYDROCHLORIDE AND ACETAMINOPHEN 5; 325 MG/1; MG/1
1 TABLET ORAL NIGHTLY
Qty: 30 TABLET | Refills: 0 | Status: SHIPPED | OUTPATIENT
Start: 2021-05-26 | End: 2021-07-07 | Stop reason: SDUPTHER

## 2021-05-26 ASSESSMENT — PATIENT HEALTH QUESTIONNAIRE - PHQ9
SUM OF ALL RESPONSES TO PHQ QUESTIONS 1-9: 0
SUM OF ALL RESPONSES TO PHQ QUESTIONS 1-9: 0

## 2021-05-26 NOTE — PATIENT INSTRUCTIONS
Personalized Preventive Plan for Bib Austin - 5/26/2021  Medicare offers a range of preventive health benefits. Some of the tests and screenings are paid in full while other may be subject to a deductible, co-insurance, and/or copay. Some of these benefits include a comprehensive review of your medical history including lifestyle, illnesses that may run in your family, and various assessments and screenings as appropriate. After reviewing your medical record and screening and assessments performed today your provider may have ordered immunizations, labs, imaging, and/or referrals for you. A list of these orders (if applicable) as well as your Preventive Care list are included within your After Visit Summary for your review. Other Preventive Recommendations:    · A preventive eye exam performed by an eye specialist is recommended every 1-2 years to screen for glaucoma; cataracts, macular degeneration, and other eye disorders. · A preventive dental visit is recommended every 6 months. · Try to get at least 150 minutes of exercise per week or 10,000 steps per day on a pedometer . · Order or download the FREE \"Exercise & Physical Activity: Your Everyday Guide\" from [employer]. Call 8-312.563.4293 or search [employer] online. · You need 0331-0379 mg of calcium and 6718-4368 IU of vitamin D per day. It is possible to meet your calcium requirement with diet alone, but a vitamin D supplement is usually necessary to meet this goal.  · When exposed to the sun, use a sunscreen that protects against both UVA and UVB radiation with an SPF of 30 or greater. Reapply every 2 to 3 hours or after sweating, drying off with a towel, or swimming. · Always wear a seat belt when traveling in a car. Always wear a helmet when riding a bicycle or motorcycle.

## 2021-05-26 NOTE — PROGRESS NOTES
Medicare Annual Wellness Visit  Name: Master Washington Date: 2021   MRN: [id number] Sex: Female   Age: 61 y.o. Ethnicity: Non-/Non    : 1955 Race: [place] Prophet is here for Diabetes    Screenings for behavioral, psychosocial and functional/safety risks, and cognitive dysfunction are all negative except as indicated below. These results, as well as other patient data from the [address] form, are documented in Flowsheets linked to this Encounter. Allergies   Allergen Reactions    Compazine [Prochlorperazine]      Cause neurological sx    Sulfa Antibiotics      Childhood allergy         Prior to Visit Medications    Medication Sig Taking? Authorizing Provider   rivaroxaban (XARELTO) 20 MG TABS tablet Take 1 tablet by mouth daily (with breakfast) TAKE (1) TABLET IN THE EVENING. Yes Brant Rogers MD   oxyCODONE-acetaminophen (PERCOCET) 5-325 MG per tablet Take 1 tablet by mouth nightly for 30 days. Yes Brant Rogers MD   metoprolol tartrate (LOPRESSOR) 25 MG tablet TAKE (1) [place] Deist Yes Brant Rogers MD   DULoxetine (CYMBALTA) 60 MG extended release capsule TAKE (1) [place] Yes Brant Rogers MD   pyridostigmine (MESTINON) 60 MG tablet TAKE (1) TABLET EVERY MORNING Yes Brant Rogers MD   rivaroxaban (XARELTO) 20 MG TABS tablet TAKE (1) TABLET IN THE EVENING.  Yes Brant Rogers MD   omeprazole (PRILOSEC) 20 MG delayed release capsule TAKE (1) CAPSULE EVERY MORNING BEFORE BREAKFAST Yes Brant Rogers MD   cephALEXin (KEFLEX) 500 MG capsule TAKE 1 CAPSULE BY MOUTH 4 TIMES DAILY Yes Brant Rogers MD   atorvastatin (LIPITOR) 20 MG tablet Take 1 tablet by mouth daily Yes Brant Rogers MD   losartan (COZAAR) 25 MG tablet Take 1 tablet by mouth daily Yes Brant Rogers MD   nystatin (NYSTATIN) 318268 UNIT/GM powder APPLY TOPICALLY 3 TO 4 TIMES DAILY Yes Brant Rogers MD omeprazole (PRILOSEC OTC) 20 MG tablet Take 20 mg by mouth every morning (before breakfast) Yes Historical Provider, MD   oxybutynin (DITROPAN-XL) 10 MG extended release tablet  Yes Historical Provider, MD   metFORMIN (GLUCOPHAGE) 500 MG tablet Take 1 tablet by mouth daily (with breakfast) Yes Sree Levy MD   furosemide (LASIX) 40 MG tablet TAKE (1) TABLET EVERY MORNING Yes Sree Levy MD   cyclobenzaprine (FLEXERIL) 10 MG tablet Take 1 tablet by mouth 2 times daily Yes Sree Levy MD   potassium chloride (KLOR-CON) 10 MEQ extended release tablet TAKE (1) [place] Yes Sree Levy MD   baclofen (LIORESAL) 10 MG tablet Take 1 tablet by mouth 3 times daily Yes Sree Levy MD   ciclopirox (LOPROX) 0.77 % cream Apply topically 2 times daily.  Yes Sree Levy MD   promethazine (PHENERGAN) 25 MG tablet TAKE (1) TABLET FOUR TIMES A DAY AS NEEDED FOR NAUSEA Yes LITA Young - CNP   Multiple Vitamin (MULTIVITAMIN) tablet TAKE 1 TABLET ONCE DAILY Yes Sree Levy MD   Multiple Vitamins-Minerals (RA CENTRAL-HANNA [facility]) TABS take 1 tablet by mouth once daily for PREVENTATIVE Yes Historical Provider, MD MO HEMORRHOIDAL 1-0.25-14.4-15 % CREA rectal cream apply TO RECTUM AREA once daily if needed for HEMMORROIDS Yes Historical Provider, MD   TRUEPLUS LANCETS 30G MISC use as directed TESTING ONCE A WEEK Yes Historical Provider, MD   hydrocortisone 2.5 % cream apply to RIGHT HAND AND ARM three times a day if needed for itching Yes Historical Provider, MD   TRUE METRIX BLOOD GLUCOSE TEST strip TEST ONCE A WEEK Yes Historical Provider, MD MO ALLERGY 25 MG tablet take 1 tablet by mouth three times a day if needed for itching Yes Historical Provider, MD   Blood Glucose Monitoring Suppl (TRUE METRIX METER) w/Device KIT use as directed ONCE A WEEK Yes Historical ProviderMD MO ACETAMINOPHEN 325 MG tablet take 1 tablet by mouth every 4 hours if needed for non-distended, normal bowel sounds, no masses or organomegaly  Extremities: no cyanosis, clubbing. 1+ edema   Musculoskeletal: normal range of motion, no joint swelling, deformity or tenderness  Neurologic: reflexes normal and symmetric, no cranial nerve deficit, gait, coordination and speech normal    Patient's complete Health Risk Assessment and screening values have been reviewed and are found in Flowsheets. The following problems were reviewed today and where indicated follow up appointments were made and/or referrals ordered. Positive Risk Factor Screenings with Interventions:            General Health and ACP:  General  In general, how would you say your health is?: Fair  In the past 7 days, have you experienced any of the following?  New or Increased Pain, New or Increased Fatigue, Loneliness, Social Isolation, Stress or Anger?: None of These  Do you get the social and emotional support that you need?: Yes  Do you have a Living Will?: (!) No  Advance Directives     Power of [facility] Will ACP-Advance Directive ACP-Power of     Not on File Not on File Not on File Not on File      General Health Risk Interventions:  · No Living Will: Advance Care Planning addressed with patient today    Health Habits/Nutrition:  Health Habits/Nutrition  Do you exercise for at least 20 minutes 2-3 times per week?: (!) No  Have you lost any weight without trying in the past 3 months?: No  Do you eat only one meal per day?: No  Have you seen the dentist within the past year?: (!) No     Health Habits/Nutrition Interventions:  · Dental exam overdue:  patient encouraged to make appointment with his/her dentist    Hearing/Vision:  No exam data present  Hearing/Vision  Do you or your family notice any trouble with your hearing that hasn't been managed with hearing aids?: No  Do you have difficulty driving, watching TV, or doing any of your daily activities because of your eyesight?: No  Have you had an eye exam within the past year?: (!) No  Hearing/Vision Interventions:  · No concerns      ADL:  ADLs  In the past 7 days, did you need help from others to perform any of the following everyday activities? Eating, dressing, grooming, bathing, toileting, or walking/balance?: (!) Dressing, Grooming, Bathing, Toileting, Walking/Balance  In the past 7 days, did you need help from others to take care of any of the following? Laundry, housekeeping, banking/finances, shopping, telephone use, food preparation, transportation, or taking medications?: (!) Laundry, Food Preparation, Housekeeping, Transportation  ADL Interventions:  · Patient declines any further evaluation/treatment for this issue    Personalized Preventive Plan   Current Health Maintenance Status  Immunization History   Administered Date(s) Administered    COVID-19, Pfizer, PF, 30mcg/0.3mL 03/17/2021, 04/07/2021    Influenza A (H5N1) Monovalent Vaccine, Adjuvanted-2013 01/01/2015    Influenza, [id number] 01/01/2015    Influenza, Quadv, IM, PF (6 mo and older Fluzone, Flulaval, Fluarix, and 3 yrs and older Afluria) 01/01/2009, 10/01/2017, 09/20/2018, 09/11/2019, 11/24/2020    Pneumococcal Polysaccharide ([id number]) 12/20/2017    Tdap (Boostrix, Adacel) 01/01/2005, 09/20/2018        Health Maintenance   Topic Date Due    Shingles Vaccine (1 of 2) Never done    Colon cancer screen colonoscopy  Never done   [employer] Visit (AWV)  Never done    Potassium monitoring  10/07/2021    Creatinine monitoring  10/07/2021    A1C test (Diabetic or Prediabetic)  05/26/2022    Breast cancer screen  08/27/2022    Pneumococcal 0-64 years Vaccine (2 of 2) 12/20/2022    DTaP/Tdap/Td vaccine (3 - Td) 09/20/2028    Flu vaccine  Completed    COVID-19 Vaccine  Completed    Hepatitis A vaccine  Aged Out    Hib vaccine  Aged Out    Meningococcal (ACWY) vaccine  Aged Out     Recommendations for [place] Due: see orders and patient instructions/AVS.  .   Recommended screening schedule for the next 5-10 years is provided to the patient in written form: see Patient Instructions/AVS.    Andrés Hackett was seen today for diabetes. Diagnoses and all orders for this visit:    Controlled type 2 diabetes mellitus without complication, without long-term current use of insulin (HCC)  -     MO COLLECTION CAPILLARY BLOOD SPECIMEN  -     POCT glycosylated hemoglobin (Hb A1C)    Paraplegia, complete (HCC)  Stable     Chronic atrial fibrillation (HCC)  Restart Xarelto 20mg daily    Routine general medical examination at a health care facility  Done    Transverse myelitis ([facility]  -     oxyCODONE-acetaminophen (PERCOCET) 5-325 MG per tablet; Take 1 tablet by mouth nightly for 30 days. Other orders  -     rivaroxaban (XARELTO) 20 MG TABS tablet; Take 1 tablet by mouth daily (with breakfast) TAKE (1) TABLET IN THE EVENING. Shingrix vaccine today        Patient's current stand-up lift is broken. Patient needs to be able to get out of bed. Patient not able to function or do activities of daily living without it.   Patient needs a new one, or this one repaired

## 2021-05-26 NOTE — TELEPHONE ENCOUNTER
[facility] calling. Asking, if you would send over a corrected rx for the Xarelto? It has 2 directions on it. Pt, has been taking it in the evening. [facility] listed.

## 2021-06-17 DIAGNOSIS — K21.9 GASTROESOPHAGEAL REFLUX DISEASE WITHOUT ESOPHAGITIS: ICD-10-CM

## 2021-06-17 RX ORDER — DULOXETIN HYDROCHLORIDE 60 MG/1
CAPSULE, DELAYED RELEASE ORAL
Qty: 28 CAPSULE | Refills: 0 | Status: SHIPPED | OUTPATIENT
Start: 2021-06-17 | End: 2021-07-19

## 2021-06-17 RX ORDER — FUROSEMIDE 40 MG/1
TABLET ORAL
Qty: 28 TABLET | Refills: 0 | Status: SHIPPED | OUTPATIENT
Start: 2021-06-17 | End: 2021-07-19

## 2021-06-17 RX ORDER — PYRIDOSTIGMINE BROMIDE 60 MG/1
TABLET ORAL
Qty: 28 TABLET | Refills: 0 | Status: SHIPPED | OUTPATIENT
Start: 2021-06-17 | End: 2021-07-19

## 2021-06-17 RX ORDER — OMEPRAZOLE 20 MG/1
CAPSULE, DELAYED RELEASE ORAL
Qty: 28 CAPSULE | Refills: 0 | Status: SHIPPED | OUTPATIENT
Start: 2021-06-17 | End: 2021-07-19

## 2021-06-24 ENCOUNTER — TELEPHONE (OUTPATIENT)
Dept: PRIMARY CARE CLINIC | Age: 66
End: 2021-06-24

## 2021-06-24 NOTE — TELEPHONE ENCOUNTER
Pt states her stand up lift is malfunctioning and needs replaced. Asking for an rx. Uses it for transfers and can't ambulate without it.  Also needs the last OV note addended to state why it's needed etc.          [facility] on astrid no organomegaly/no distention/no bruit/nontender/soft/no guarding/no rebound tenderness/no rigidity/bowel sounds normal/no masses palpable

## 2021-07-06 ENCOUNTER — TELEPHONE (OUTPATIENT)
Dept: PRIMARY CARE CLINIC | Age: 66
End: 2021-07-06

## 2021-07-06 NOTE — TELEPHONE ENCOUNTER
Esthela Posey from the [facility] [employer] renewed pt's services. Asking if there is any service you think that the pt may need? Please Advise.   [phone]

## 2021-07-07 DIAGNOSIS — G37.3 TRANSVERSE MYELITIS (HCC): ICD-10-CM

## 2021-07-07 RX ORDER — OXYCODONE HYDROCHLORIDE AND ACETAMINOPHEN 5; 325 MG/1; MG/1
1 TABLET ORAL NIGHTLY
Qty: 30 TABLET | Refills: 0 | Status: SHIPPED | OUTPATIENT
Start: 2021-07-07 | End: 2021-08-11 | Stop reason: SDUPTHER

## 2021-07-14 ENCOUNTER — TELEPHONE (OUTPATIENT)
Dept: PRIMARY CARE CLINIC | Age: 66
End: 2021-07-14

## 2021-07-14 NOTE — TELEPHONE ENCOUNTER
Jamil RN with [facility] called stating pt was wanting respite stay at [facility] from 8/26 to 8/31 and they need the order, last note and med list faxed to [facility]. Juli Blackwood for order/respite stay?       DX Paraplegia

## 2021-07-19 DIAGNOSIS — K21.9 GASTROESOPHAGEAL REFLUX DISEASE WITHOUT ESOPHAGITIS: ICD-10-CM

## 2021-07-19 RX ORDER — PYRIDOSTIGMINE BROMIDE 60 MG/1
TABLET ORAL
Qty: 28 TABLET | Refills: 0 | Status: SHIPPED | OUTPATIENT
Start: 2021-07-19 | End: 2021-08-16

## 2021-07-19 RX ORDER — OMEPRAZOLE 20 MG/1
CAPSULE, DELAYED RELEASE ORAL
Qty: 28 CAPSULE | Refills: 0 | Status: SHIPPED | OUTPATIENT
Start: 2021-07-19 | End: 2021-08-16

## 2021-07-19 RX ORDER — FUROSEMIDE 40 MG/1
TABLET ORAL
Qty: 28 TABLET | Refills: 0 | Status: SHIPPED | OUTPATIENT
Start: 2021-07-19 | End: 2021-08-16

## 2021-07-19 RX ORDER — DULOXETIN HYDROCHLORIDE 60 MG/1
CAPSULE, DELAYED RELEASE ORAL
Qty: 28 CAPSULE | Refills: 0 | Status: SHIPPED | OUTPATIENT
Start: 2021-07-19 | End: 2021-08-16

## 2021-08-11 DIAGNOSIS — G37.3 TRANSVERSE MYELITIS (HCC): ICD-10-CM

## 2021-08-11 RX ORDER — OXYCODONE HYDROCHLORIDE AND ACETAMINOPHEN 5; 325 MG/1; MG/1
1 TABLET ORAL NIGHTLY
Qty: 30 TABLET | Refills: 0 | Status: SHIPPED | OUTPATIENT
Start: 2021-08-11 | End: 2021-09-10

## 2021-08-16 DIAGNOSIS — K21.9 GASTROESOPHAGEAL REFLUX DISEASE WITHOUT ESOPHAGITIS: ICD-10-CM

## 2021-08-16 RX ORDER — FUROSEMIDE 40 MG/1
TABLET ORAL
Qty: 28 TABLET | Refills: 0 | Status: SHIPPED | OUTPATIENT
Start: 2021-08-16 | End: 2021-09-14

## 2021-08-16 RX ORDER — NYSTATIN 100000 [USP'U]/G
POWDER TOPICAL
Qty: 60 G | Refills: 0 | Status: SHIPPED | OUTPATIENT
Start: 2021-08-16 | End: 2022-07-19

## 2021-08-16 RX ORDER — PYRIDOSTIGMINE BROMIDE 60 MG/1
TABLET ORAL
Qty: 28 TABLET | Refills: 0 | Status: SHIPPED | OUTPATIENT
Start: 2021-08-16 | End: 2021-09-14

## 2021-08-16 RX ORDER — OMEPRAZOLE 20 MG/1
CAPSULE, DELAYED RELEASE ORAL
Qty: 28 CAPSULE | Refills: 0 | Status: SHIPPED | OUTPATIENT
Start: 2021-08-16 | End: 2021-09-14

## 2021-08-16 RX ORDER — DULOXETIN HYDROCHLORIDE 60 MG/1
CAPSULE, DELAYED RELEASE ORAL
Qty: 28 CAPSULE | Refills: 0 | Status: SHIPPED | OUTPATIENT
Start: 2021-08-16 | End: 2021-09-14

## 2021-08-26 ENCOUNTER — OFFICE VISIT (OUTPATIENT)
Dept: PRIMARY CARE CLINIC | Age: 66
End: 2021-08-26
Payer: MEDICARE

## 2021-08-26 VITALS — HEIGHT: 63 IN | BODY MASS INDEX: 35.43 KG/M2

## 2021-08-26 DIAGNOSIS — G37.3 TRANSVERSE MYELITIS (HCC): ICD-10-CM

## 2021-08-26 DIAGNOSIS — Z13.220 ENCOUNTER FOR LIPID SCREENING FOR CARDIOVASCULAR DISEASE: ICD-10-CM

## 2021-08-26 DIAGNOSIS — Z13.6 ENCOUNTER FOR LIPID SCREENING FOR CARDIOVASCULAR DISEASE: ICD-10-CM

## 2021-08-26 DIAGNOSIS — Z12.31 ENCOUNTER FOR SCREENING MAMMOGRAM FOR MALIGNANT NEOPLASM OF BREAST: ICD-10-CM

## 2021-08-26 DIAGNOSIS — Z00.00 ANNUAL PHYSICAL EXAM: ICD-10-CM

## 2021-08-26 DIAGNOSIS — E66.01 SEVERE OBESITY (BMI 35.0-35.9 WITH COMORBIDITY) (HCC): ICD-10-CM

## 2021-08-26 DIAGNOSIS — E11.9 CONTROLLED TYPE 2 DIABETES MELLITUS WITHOUT COMPLICATION, WITHOUT LONG-TERM CURRENT USE OF INSULIN (HCC): Primary | ICD-10-CM

## 2021-08-26 DIAGNOSIS — I10 ESSENTIAL HYPERTENSION: ICD-10-CM

## 2021-08-26 DIAGNOSIS — G82.21 PARAPLEGIA, COMPLETE (HCC): ICD-10-CM

## 2021-08-26 DIAGNOSIS — I48.20 CHRONIC ATRIAL FIBRILLATION (HCC): ICD-10-CM

## 2021-08-26 LAB — HBA1C MFR BLD: 5.7 %

## 2021-08-26 PROCEDURE — G8427 DOCREV CUR MEDS BY ELIG CLIN: HCPCS | Performed by: FAMILY MEDICINE

## 2021-08-26 PROCEDURE — G8417 CALC BMI ABV UP PARAM F/U: HCPCS | Performed by: FAMILY MEDICINE

## 2021-08-26 PROCEDURE — 99214 OFFICE O/P EST MOD 30 MIN: CPT | Performed by: FAMILY MEDICINE

## 2021-08-26 PROCEDURE — 83036 HEMOGLOBIN GLYCOSYLATED A1C: CPT | Performed by: FAMILY MEDICINE

## 2021-08-26 PROCEDURE — 3044F HG A1C LEVEL LT 7.0%: CPT | Performed by: FAMILY MEDICINE

## 2021-08-26 PROCEDURE — 2022F DILAT RTA XM EVC RTNOPTHY: CPT | Performed by: FAMILY MEDICINE

## 2021-08-26 PROCEDURE — 3017F COLORECTAL CA SCREEN DOC REV: CPT | Performed by: FAMILY MEDICINE

## 2021-08-26 PROCEDURE — 1036F TOBACCO NON-USER: CPT | Performed by: FAMILY MEDICINE

## 2021-08-26 RX ORDER — OXYCODONE HYDROCHLORIDE AND ACETAMINOPHEN 5; 325 MG/1; MG/1
1 TABLET ORAL NIGHTLY
Qty: 30 TABLET | Refills: 0 | Status: SHIPPED | OUTPATIENT
Start: 2021-08-26 | End: 2021-08-26 | Stop reason: SDUPTHER

## 2021-08-26 RX ORDER — OXYCODONE HYDROCHLORIDE AND ACETAMINOPHEN 5; 325 MG/1; MG/1
1 TABLET ORAL NIGHTLY
Qty: 30 TABLET | Refills: 0 | Status: SHIPPED | OUTPATIENT
Start: 2021-08-26 | End: 2021-10-28 | Stop reason: SDUPTHER

## 2021-08-26 ASSESSMENT — ENCOUNTER SYMPTOMS
RHINORRHEA: 0
VOMITING: 0
SORE THROAT: 0
EYE REDNESS: 0
DIARRHEA: 0
NAUSEA: 0
WHEEZING: 0
EYE DISCHARGE: 0
ABDOMINAL PAIN: 0
COUGH: 0
SHORTNESS OF BREATH: 0

## 2021-08-26 ASSESSMENT — PATIENT HEALTH QUESTIONNAIRE - PHQ9
SUM OF ALL RESPONSES TO PHQ QUESTIONS 1-9: 0
SUM OF ALL RESPONSES TO PHQ QUESTIONS 1-9: 0
1. LITTLE INTEREST OR PLEASURE IN DOING THINGS: 0
2. FEELING DOWN, DEPRESSED OR HOPELESS: 0
SUM OF ALL RESPONSES TO PHQ QUESTIONS 1-9: 0
SUM OF ALL RESPONSES TO PHQ9 QUESTIONS 1 & 2: 0

## 2021-08-26 NOTE — PROGRESS NOTES
[facility] PRIMARY CARE  [address]  [facility] [address]  Dept: [phone]    Marii Pena is a 61 y.o. female Established patient, who presents today for her medical conditions/complaints as noted below. Chief Complaint   Patient presents with    Diabetes       HPI:     HPI  Patient here for diabetes recheck. Patient taking her Metformin at 1 daily. Patient states will be going in for respite care shortly. Denies any fevers or chills. Patient has been using her oxycodone at night to help sleep. Note from neurology was reviewed. Patient was started on Neurontin but states it makes her feel funny so she stopped the medication. Had been on it before. Patient on Mestinon from neurology. Do not see an associated diagnosis. Patient also requesting second Shingrix vaccine today. Patient states lymphedema boots have helped in the past.  Patient states having no irregular heartbeat at this time. Cannot tell when she is in her atrial fibrillation. Reviewed prior notes Neurology  Reviewed previous Labs    LDL Calculated (mg/dL)   Date Value   02/24/2020 117   12/31/2018 75   02/09/2018 135       (goal LDL is <100)   AST (U/L)   Date Value   10/07/2020 16     ALT (U/L)   Date Value   10/07/2020 12     BUN (mg/dL)   Date Value   10/07/2020 19     Hemoglobin A1C (%)   Date Value   08/26/2021 5.7     BP Readings from Last 3 Encounters:   05/26/21 130/76   02/24/21 128/74   11/24/20 128/74          (goal 120/80)    Past Medical History:   Diagnosis Date    Acute transverse myelitis in demyelinating disease of central nervous system ([facility]     Atrial fibrillation ([facility]     Depression     Hyperlipidemia     Hypertension       Past Surgical History:   Procedure Laterality Date    APPENDECTOMY  1988    CHOLECYSTECTOMY, OPEN  46    FEMUR FRACTURE SURGERY Left 05/2018    HYSTERECTOMY, TOTAL ABDOMINAL  1988       No family history on file.     Social History     Tobacco Use    Smoking status: Never Smoker    Smokeless tobacco: Never Used   Substance Use Topics    Alcohol use: No      Current Outpatient Medications   Medication Sig Dispense Refill    oxyCODONE-acetaminophen (PERCOCET) 5-325 MG per tablet Take 1 tablet by mouth nightly for 30 days. 30 tablet 0    DULoxetine (CYMBALTA) 60 MG extended release capsule TAKE (1) CAPSULE EVERY MORNING 28 capsule 0    metoprolol tartrate (LOPRESSOR) 25 MG tablet TAKE (1) TABLET EVERY MORNING AND EVENING 56 tablet 0    pyridostigmine (MESTINON) 60 MG tablet TAKE (1) TABLET EVERY MORNING 28 tablet 0    omeprazole (PRILOSEC) 20 MG delayed release capsule TAKE (1) CAPSULE EVERY MORNING BEFORE BREAKFAST 28 capsule 0    furosemide (LASIX) 40 MG tablet TAKE (1) TABLET EVERY MORNING 28 tablet 0    nystatin (NYSTATIN) 990253 UNIT/GM powder APPLY TOPICALLY 3 TO 4 TIMES DAILY 60 g 0    oxyCODONE-acetaminophen (PERCOCET) 5-325 MG per tablet Take 1 tablet by mouth nightly for 30 days. 30 tablet 0    rivaroxaban (XARELTO) 20 MG TABS tablet Take 1 tablet by mouth daily (with breakfast) 90 tablet 3    atorvastatin (LIPITOR) 20 MG tablet Take 1 tablet by mouth daily 28 tablet 11    losartan (COZAAR) 25 MG tablet Take 1 tablet by mouth daily 28 tablet 11    omeprazole (PRILOSEC OTC) 20 MG tablet Take 20 mg by mouth every morning (before breakfast)      oxybutynin (DITROPAN-XL) 10 MG extended release tablet       metFORMIN (GLUCOPHAGE) 500 MG tablet Take 1 tablet by mouth daily (with breakfast) 90 tablet 3    cyclobenzaprine (FLEXERIL) 10 MG tablet Take 1 tablet by mouth 2 times daily 60 tablet 5    potassium chloride (KLOR-CON) 10 MEQ extended release tablet TAKE (1) TABLET EVERY MORNING 90 tablet 3    baclofen (LIORESAL) 10 MG tablet Take 1 tablet by mouth 3 times daily 90 tablet 5    ciclopirox (LOPROX) 0.77 % cream Apply topically 2 times daily.  90 g 1    promethazine (PHENERGAN) 25 MG tablet TAKE (1) TABLET FOUR TIMES A DAY AS NEEDED FOR NAUSEA 20 tablet 0    Multiple Vitamin (MULTIVITAMIN) tablet TAKE 1 TABLET ONCE DAILY 28 tablet 3    Multiple Vitamins-Minerals (RA CENTRAL-HANNA SENIOR) TABS take 1 tablet by mouth once daily for PREVENTATIVE  0    RA HEMORRHOIDAL 1-0.25-14.4-15 % CREA rectal cream apply TO RECTUM AREA once daily if needed for HEMMORROIDS  0    TRUEPLUS LANCETS 30G MISC use as directed TESTING ONCE A WEEK  0    hydrocortisone 2.5 % cream apply to RIGHT HAND AND ARM three times a day if needed for itching  0    TRUE METRIX BLOOD GLUCOSE TEST strip TEST ONCE A WEEK  0    RA ALLERGY 25 MG tablet take 1 tablet by mouth three times a day if needed for itching  0    Blood Glucose Monitoring Suppl (TRUE METRIX METER) w/Device KIT use as directed ONCE A WEEK  0    RA ACETAMINOPHEN 325 MG tablet take 1 tablet by mouth every 4 hours if needed for **MILD** PAIN/...  (REFER TO PRESCRIPTION NOTES). 0     No current facility-administered medications for this visit. Allergies   Allergen Reactions    Compazine [Prochlorperazine]      Cause neurological sx    Sulfa Antibiotics      Childhood allergy       Health Maintenance   Topic Date Due    Colon cancer screen colonoscopy  Never done    Shingles Vaccine (2 of 2) 07/21/2021    Flu vaccine (1) 09/01/2021    Potassium monitoring  10/07/2021    Creatinine monitoring  10/07/2021    Annual Wellness Visit (AWV)  05/27/2022    A1C test (Diabetic or Prediabetic)  08/26/2022    Breast cancer screen  08/27/2022    Pneumococcal 0-64 years Vaccine (2 of 2 - PPSV23) 12/20/2022    DTaP/Tdap/Td vaccine (3 - Td or Tdap) 09/20/2028    COVID-19 Vaccine  Completed    Hepatitis A vaccine  Aged Out    Hib vaccine  Aged Out    Meningococcal (ACWY) vaccine  Aged Out       Subjective:      Review of Systems   Constitutional: Negative for chills and fever. HENT: Negative for rhinorrhea and sore throat. Eyes: Negative for discharge and redness.    Respiratory: Negative for cough, shortness of breath and wheezing. Cardiovascular: Positive for leg swelling. Negative for chest pain and palpitations. Gastrointestinal: Negative for abdominal pain, diarrhea, nausea and vomiting. Genitourinary: Negative for dysuria and frequency. Musculoskeletal: Negative for arthralgias and myalgias. Neurological: Negative for dizziness, light-headedness and headaches. Psychiatric/Behavioral: Negative for sleep disturbance. Objective:     Ht 5' 3\" (1.6 m)   BMI 35.43 kg/m²   Physical Exam  Vitals and nursing note reviewed. Constitutional:       General: She is not in acute distress. Appearance: She is well-developed. She is not ill-appearing. HENT:      Head: Normocephalic and atraumatic. Right Ear: External ear normal.      Left Ear: External ear normal.   Eyes:      General: No scleral icterus. Right eye: No discharge. Left eye: No discharge. Conjunctiva/sclera: Conjunctivae normal.      Pupils: Pupils are equal, round, and reactive to light. Neck:      Thyroid: No thyromegaly. Trachea: No tracheal deviation. Cardiovascular:      Rate and Rhythm: Normal rate and regular rhythm. Heart sounds: Normal heart sounds. Pulmonary:      Effort: Pulmonary effort is normal. No respiratory distress. Breath sounds: Normal breath sounds. No wheezing. Musculoskeletal:      Right lower leg: Edema present. Left lower leg: Edema present. Comments: 3+ nonpitting edema right leg, 2+ nonpitting edema left leg   Lymphadenopathy:      Cervical: No cervical adenopathy. Skin:     General: Skin is warm. Findings: No rash. Neurological:      Mental Status: She is alert and oriented to person, place, and time. Psychiatric:         Mood and Affect: Mood normal.         Behavior: Behavior normal.         Thought Content: Thought content normal.         Assessment:       Diagnosis Orders   1.  Controlled type 2 diabetes mellitus without complication, without long-term current use of insulin (HCC)  POCT glycosylated hemoglobin (Hb A1C)   2. Chronic atrial fibrillation (HCC)  T4, Free    TSH   3. Essential hypertension     4. Paraplegia, complete ([facility]     5. Severe obesity (BMI 35.0-35.9 with comorbidity) ([facility]     6. Transverse myelitis ([facility]  oxyCODONE-acetaminophen (PERCOCET) 5-325 MG per tablet    DISCONTINUED: oxyCODONE-acetaminophen (PERCOCET) 5-325 MG per tablet   7. Encounter for screening mammogram for malignant neoplasm of breast  ZEUS DIGITAL SCREEN W OR WO CAD BILATERAL   8. Annual physical exam  Basic Metabolic Panel, Fasting    Hepatic Function Panel   9. Encounter for lipid screening for cardiovascular disease  Lipid, Fasting        Plan:    Shingrix vaccine today. Mammogram ordered. Blood work ordered. Renew oxycodone. We will attempt to get lymphedema pumps approved for patient. States these have always worked in the past when she is in skilled nursing facility. Continue follow-up with neurology    Return in about 3 months (around 11/26/2021).     Orders Placed This Encounter   Procedures    ZEUS DIGITAL SCREEN W OR WO CAD BILATERAL     Standing Status:   Future     Standing Expiration Date:   10/26/2022     Order Specific Question:   Reason for exam:     Answer:   screen    Lipid, Fasting     Standing Status:   Future     Standing Expiration Date:   8/26/2022    Basic Metabolic Panel, Fasting     Standing Status:   Future     Standing Expiration Date:   8/26/2022    Hepatic Function Panel     Standing Status:   Future     Standing Expiration Date:   8/26/2022    T4, Free     Standing Status:   Future     Standing Expiration Date:   8/26/2022    TSH     Standing Status:   Future     Standing Expiration Date:   8/26/2022    POCT glycosylated hemoglobin (Hb A1C)     Orders Placed This Encounter   Medications    DISCONTD: oxyCODONE-acetaminophen (PERCOCET) 5-325 MG per tablet     Sig: Take 1 tablet by mouth nightly for 30 days. Dispense:  30 tablet     Refill:  0     Reduce doses taken as pain becomes manageable    oxyCODONE-acetaminophen (PERCOCET) 5-325 MG per tablet     Sig: Take 1 tablet by mouth nightly for 30 days. Dispense:  30 tablet     Refill:  0     Reduce doses taken as pain becomes manageable       Patient given educational materials - see patient instructions. Discussed use, benefit, and side effects of prescribed medications. All patient questions answered. Pt voiced understanding. Reviewed health maintenance. Instructed to continue current medications, diet andexercise. Patient agreed with treatment plan. Follow up as directed.      Electronicallysigned by Raul Hodge MD on 8/26/2021 at 11:42 AM

## 2021-09-13 DIAGNOSIS — K21.9 GASTROESOPHAGEAL REFLUX DISEASE WITHOUT ESOPHAGITIS: ICD-10-CM

## 2021-09-14 RX ORDER — DULOXETIN HYDROCHLORIDE 60 MG/1
CAPSULE, DELAYED RELEASE ORAL
Qty: 28 CAPSULE | Refills: 0 | Status: SHIPPED | OUTPATIENT
Start: 2021-09-14 | End: 2021-10-12

## 2021-09-14 RX ORDER — PYRIDOSTIGMINE BROMIDE 60 MG/1
TABLET ORAL
Qty: 28 TABLET | Refills: 0 | Status: SHIPPED | OUTPATIENT
Start: 2021-09-14 | End: 2021-10-12

## 2021-09-14 RX ORDER — OMEPRAZOLE 20 MG/1
CAPSULE, DELAYED RELEASE ORAL
Qty: 28 CAPSULE | Refills: 0 | Status: SHIPPED | OUTPATIENT
Start: 2021-09-14 | End: 2021-10-12

## 2021-09-14 RX ORDER — FUROSEMIDE 40 MG/1
TABLET ORAL
Qty: 28 TABLET | Refills: 0 | Status: SHIPPED | OUTPATIENT
Start: 2021-09-14 | End: 2021-10-12

## 2021-09-17 ENCOUNTER — TELEPHONE (OUTPATIENT)
Dept: PRIMARY CARE CLINIC | Age: 66
End: 2021-09-17

## 2021-09-17 NOTE — TELEPHONE ENCOUNTER
Pt states that she has not heard anything re the Lymphedema pumps for her legs. Rt leg, is starting to get a little bigger. Has a sm dot on calf that weeps a little. Having to put gauze on it. Asking for an update.   [phone]

## 2021-10-08 ENCOUNTER — TELEPHONE (OUTPATIENT)
Dept: PRIMARY CARE CLINIC | Age: 66
End: 2021-10-08

## 2021-10-08 RX ORDER — CEPHALEXIN 500 MG/1
500 CAPSULE ORAL 4 TIMES DAILY
Qty: 20 CAPSULE | Refills: 0 | Status: SHIPPED | OUTPATIENT
Start: 2021-10-08 | End: 2022-04-19

## 2021-10-08 NOTE — TELEPHONE ENCOUNTER
Pt has strong smelling, dark urine and frequency x7 days. Asking for an abx, pharm [facility]

## 2021-10-11 DIAGNOSIS — K21.9 GASTROESOPHAGEAL REFLUX DISEASE WITHOUT ESOPHAGITIS: ICD-10-CM

## 2021-10-12 RX ORDER — OMEPRAZOLE 20 MG/1
CAPSULE, DELAYED RELEASE ORAL
Qty: 28 CAPSULE | Refills: 0 | Status: SHIPPED | OUTPATIENT
Start: 2021-10-12 | End: 2021-11-08

## 2021-10-12 RX ORDER — PYRIDOSTIGMINE BROMIDE 60 MG/1
TABLET ORAL
Qty: 28 TABLET | Refills: 0 | Status: SHIPPED | OUTPATIENT
Start: 2021-10-12 | End: 2021-11-08

## 2021-10-12 RX ORDER — DULOXETIN HYDROCHLORIDE 60 MG/1
CAPSULE, DELAYED RELEASE ORAL
Qty: 28 CAPSULE | Refills: 0 | Status: SHIPPED | OUTPATIENT
Start: 2021-10-12 | End: 2021-11-08

## 2021-10-12 RX ORDER — FUROSEMIDE 40 MG/1
TABLET ORAL
Qty: 28 TABLET | Refills: 0 | Status: SHIPPED | OUTPATIENT
Start: 2021-10-12 | End: 2021-11-08

## 2021-10-19 ENCOUNTER — HOSPITAL ENCOUNTER (OUTPATIENT)
Dept: MAMMOGRAPHY | Age: 66
Discharge: HOME | End: 2021-10-21
Payer: MEDICARE

## 2021-10-19 ENCOUNTER — HOSPITAL ENCOUNTER (OUTPATIENT)
Age: 66
Discharge: HOME | End: 2021-10-19
Payer: MEDICARE

## 2021-10-19 DIAGNOSIS — I48.20 CHRONIC ATRIAL FIBRILLATION (HCC): ICD-10-CM

## 2021-10-19 DIAGNOSIS — Z00.00 ANNUAL PHYSICAL EXAM: ICD-10-CM

## 2021-10-19 DIAGNOSIS — Z13.220 ENCOUNTER FOR LIPID SCREENING FOR CARDIOVASCULAR DISEASE: ICD-10-CM

## 2021-10-19 DIAGNOSIS — Z13.6 ENCOUNTER FOR LIPID SCREENING FOR CARDIOVASCULAR DISEASE: ICD-10-CM

## 2021-10-19 DIAGNOSIS — Z12.31 ENCOUNTER FOR SCREENING MAMMOGRAM FOR MALIGNANT NEOPLASM OF BREAST: ICD-10-CM

## 2021-10-19 LAB
ALBUMIN SERPL-MCNC: 4.1 G/DL (ref 3.5–5.2)
ALBUMIN/GLOBULIN RATIO: 1.3 (ref 1–2.5)
ALP BLD-CCNC: 84 U/L (ref 35–104)
ALT SERPL-CCNC: 13 U/L (ref 5–33)
ANION GAP SERPL CALCULATED.3IONS-SCNC: 13 MMOL/L (ref 9–17)
AST SERPL-CCNC: 13 U/L
BILIRUB SERPL-MCNC: 0.88 MG/DL (ref 0.3–1.2)
BILIRUBIN DIRECT: 0.18 MG/DL
BILIRUBIN, INDIRECT: 0.7 MG/DL (ref 0–1)
BUN BLDV-MCNC: 14 MG/DL (ref 8–23)
BUN/CREAT BLD: ABNORMAL (ref 9–20)
CALCIUM SERPL-MCNC: 9.4 MG/DL (ref 8.6–10.4)
CHLORIDE BLD-SCNC: 103 MMOL/L (ref 98–107)
CHOLESTEROL, FASTING: 193 MG/DL
CHOLESTEROL/HDL RATIO: 3.4
CO2: 27 MMOL/L (ref 20–31)
CREAT SERPL-MCNC: 0.52 MG/DL (ref 0.5–0.9)
GFR AFRICAN AMERICAN: >60 ML/MIN
GFR NON-AFRICAN AMERICAN: >60 ML/MIN
GFR SERPL CREATININE-BSD FRML MDRD: ABNORMAL ML/MIN/{1.73_M2}
GFR SERPL CREATININE-BSD FRML MDRD: ABNORMAL ML/MIN/{1.73_M2}
GLOBULIN: NORMAL G/DL (ref 1.5–3.8)
GLUCOSE FASTING: 91 MG/DL (ref 70–99)
HDLC SERPL-MCNC: 56 MG/DL
LDL CHOLESTEROL: 117 MG/DL (ref 0–130)
POTASSIUM SERPL-SCNC: 3.6 MMOL/L (ref 3.7–5.3)
SODIUM BLD-SCNC: 143 MMOL/L (ref 135–144)
THYROXINE, FREE: 1.12 NG/DL (ref 0.93–1.7)
TOTAL PROTEIN: 7.2 G/DL (ref 6.4–8.3)
TRIGLYCERIDE, FASTING: 100 MG/DL
TSH SERPL DL<=0.05 MIU/L-ACNC: 4.07 MIU/L (ref 0.3–5)
VLDLC SERPL CALC-MCNC: NORMAL MG/DL (ref 1–30)

## 2021-10-19 PROCEDURE — 84439 ASSAY OF FREE THYROXINE: CPT

## 2021-10-19 PROCEDURE — 36415 COLL VENOUS BLD VENIPUNCTURE: CPT

## 2021-10-19 PROCEDURE — 80076 HEPATIC FUNCTION PANEL: CPT

## 2021-10-19 PROCEDURE — 77063 BREAST TOMOSYNTHESIS BI: CPT

## 2021-10-19 PROCEDURE — 80061 LIPID PANEL: CPT

## 2021-10-19 PROCEDURE — 84443 ASSAY THYROID STIM HORMONE: CPT

## 2021-10-19 PROCEDURE — 80048 BASIC METABOLIC PNL TOTAL CA: CPT

## 2021-10-21 RX ORDER — POTASSIUM CHLORIDE 750 MG/1
10 TABLET, FILM COATED, EXTENDED RELEASE ORAL 2 TIMES DAILY
Qty: 180 TABLET | Refills: 3 | Status: SHIPPED | OUTPATIENT
Start: 2021-10-21 | End: 2022-07-19 | Stop reason: SDUPTHER

## 2021-10-27 DIAGNOSIS — G37.3 TRANSVERSE MYELITIS (HCC): ICD-10-CM

## 2021-10-28 RX ORDER — OXYCODONE HYDROCHLORIDE AND ACETAMINOPHEN 5; 325 MG/1; MG/1
1 TABLET ORAL NIGHTLY
Qty: 30 TABLET | Refills: 0 | Status: SHIPPED | OUTPATIENT
Start: 2021-10-28 | End: 2021-11-30 | Stop reason: SDUPTHER

## 2021-11-04 RX ORDER — NITROFURANTOIN 25; 75 MG/1; MG/1
100 CAPSULE ORAL 2 TIMES DAILY
Qty: 14 CAPSULE | Refills: 0 | Status: SHIPPED | OUTPATIENT
Start: 2021-11-04 | End: 2021-11-11

## 2021-11-04 NOTE — TELEPHONE ENCOUNTER
Pt asking, if something other than Keflex be sent in for her to [facility]? Does not think that it has help with her frequency and strong odor.

## 2021-11-08 DIAGNOSIS — K21.9 GASTROESOPHAGEAL REFLUX DISEASE WITHOUT ESOPHAGITIS: ICD-10-CM

## 2021-11-08 RX ORDER — PYRIDOSTIGMINE BROMIDE 60 MG/1
TABLET ORAL
Qty: 28 TABLET | Refills: 0 | Status: SHIPPED | OUTPATIENT
Start: 2021-11-08 | End: 2021-12-02

## 2021-11-08 RX ORDER — FUROSEMIDE 40 MG/1
TABLET ORAL
Qty: 28 TABLET | Refills: 0 | Status: SHIPPED | OUTPATIENT
Start: 2021-11-08 | End: 2021-12-02

## 2021-11-08 RX ORDER — DULOXETIN HYDROCHLORIDE 60 MG/1
CAPSULE, DELAYED RELEASE ORAL
Qty: 28 CAPSULE | Refills: 0 | Status: SHIPPED | OUTPATIENT
Start: 2021-11-08 | End: 2021-12-02

## 2021-11-08 RX ORDER — OMEPRAZOLE 20 MG/1
CAPSULE, DELAYED RELEASE ORAL
Qty: 28 CAPSULE | Refills: 0 | Status: SHIPPED | OUTPATIENT
Start: 2021-11-08 | End: 2021-12-02

## 2021-11-22 ENCOUNTER — TELEPHONE (OUTPATIENT)
Dept: PRIMARY CARE CLINIC | Age: 66
End: 2021-11-22

## 2021-11-22 NOTE — TELEPHONE ENCOUNTER
Pt asking for orders for the following- 6 bags of briefs XL,2 bags pullups XL, 9 packs waterproof bed pads, 2 boxes large gloves. [facility] States she used to go to [facility] but her insurance has changed.

## 2021-11-30 DIAGNOSIS — G37.3 TRANSVERSE MYELITIS (HCC): ICD-10-CM

## 2021-11-30 RX ORDER — OXYCODONE HYDROCHLORIDE AND ACETAMINOPHEN 5; 325 MG/1; MG/1
1 TABLET ORAL NIGHTLY
Qty: 30 TABLET | Refills: 0 | Status: SHIPPED | OUTPATIENT
Start: 2021-11-30 | End: 2021-12-27 | Stop reason: SDUPTHER

## 2021-11-30 NOTE — TELEPHONE ENCOUNTER
----- Message from Samir Aguilar sent at 11/29/2021  1:45 PM EST -----  Subject: Refill Request    QUESTIONS  Name of Medication? oxyCODONE-acetaminophen (PERCOCET) 5-325 MG per tablet  Patient-reported dosage and instructions? 5-325 MG  How many days do you have left? 0  Preferred Pharmacy? [address] [place] [address] phone number (if available)? [phone]  Additional Information for Provider? Pt of Dr. Dandre Zamarripa called to request   medication refill. Prefers [facility].   ---------------------------------------------------------------------------  --------------  Sangeetha Fearing INFO  What is the best way for the office to contact you? OK to leave message on   voicemail  Preferred Call Back Phone Number?  [phone]

## 2021-12-02 ENCOUNTER — OFFICE VISIT (OUTPATIENT)
Dept: PRIMARY CARE CLINIC | Age: 66
End: 2021-12-02
Payer: MEDICAID

## 2021-12-02 VITALS
HEIGHT: 63 IN | SYSTOLIC BLOOD PRESSURE: 134 MMHG | HEART RATE: 96 BPM | OXYGEN SATURATION: 96 % | DIASTOLIC BLOOD PRESSURE: 82 MMHG | BODY MASS INDEX: 35.43 KG/M2

## 2021-12-02 DIAGNOSIS — I89.0 LYMPHEDEMA: ICD-10-CM

## 2021-12-02 DIAGNOSIS — K21.9 GASTROESOPHAGEAL REFLUX DISEASE WITHOUT ESOPHAGITIS: ICD-10-CM

## 2021-12-02 DIAGNOSIS — G37.3 TRANSVERSE MYELITIS (HCC): ICD-10-CM

## 2021-12-02 DIAGNOSIS — E11.9 CONTROLLED TYPE 2 DIABETES MELLITUS WITHOUT COMPLICATION, WITHOUT LONG-TERM CURRENT USE OF INSULIN (HCC): Primary | ICD-10-CM

## 2021-12-02 DIAGNOSIS — Z12.11 ENCOUNTER FOR SCREENING FOR MALIGNANT NEOPLASM OF COLON: ICD-10-CM

## 2021-12-02 DIAGNOSIS — G82.21 PARAPLEGIA, COMPLETE (HCC): ICD-10-CM

## 2021-12-02 LAB — HBA1C MFR BLD: 6.1 %

## 2021-12-02 PROCEDURE — 90471 IMMUNIZATION ADMIN: CPT | Performed by: FAMILY MEDICINE

## 2021-12-02 PROCEDURE — 83036 HEMOGLOBIN GLYCOSYLATED A1C: CPT | Performed by: FAMILY MEDICINE

## 2021-12-02 PROCEDURE — 99214 OFFICE O/P EST MOD 30 MIN: CPT | Performed by: FAMILY MEDICINE

## 2021-12-02 PROCEDURE — 90674 CCIIV4 VAC NO PRSV 0.5 ML IM: CPT | Performed by: FAMILY MEDICINE

## 2021-12-02 RX ORDER — FUROSEMIDE 40 MG/1
TABLET ORAL
Qty: 28 TABLET | Refills: 0 | Status: SHIPPED | OUTPATIENT
Start: 2021-12-02 | End: 2021-12-30

## 2021-12-02 RX ORDER — DULOXETIN HYDROCHLORIDE 60 MG/1
CAPSULE, DELAYED RELEASE ORAL
Qty: 28 CAPSULE | Refills: 0 | Status: SHIPPED | OUTPATIENT
Start: 2021-12-02 | End: 2021-12-30

## 2021-12-02 RX ORDER — CYCLOBENZAPRINE HCL 10 MG
TABLET ORAL
Qty: 60 TABLET | Refills: 0 | Status: SHIPPED | OUTPATIENT
Start: 2021-12-02 | End: 2021-12-30

## 2021-12-02 RX ORDER — OMEPRAZOLE 20 MG/1
CAPSULE, DELAYED RELEASE ORAL
Qty: 28 CAPSULE | Refills: 0 | Status: SHIPPED | OUTPATIENT
Start: 2021-12-02 | End: 2021-12-30

## 2021-12-02 RX ORDER — PYRIDOSTIGMINE BROMIDE 60 MG/1
TABLET ORAL
Qty: 28 TABLET | Refills: 0 | Status: SHIPPED | OUTPATIENT
Start: 2021-12-02 | End: 2021-12-30

## 2021-12-02 ASSESSMENT — ENCOUNTER SYMPTOMS
SORE THROAT: 0
EYE REDNESS: 0
DIARRHEA: 0
WHEEZING: 0
EYE DISCHARGE: 0
COUGH: 0
VOMITING: 0
RHINORRHEA: 0
SHORTNESS OF BREATH: 0
NAUSEA: 0
ABDOMINAL PAIN: 0

## 2021-12-02 NOTE — PROGRESS NOTES
[facility] PRIMARY CARE  [address]  [facility] [address]  Dept: [phone]    Luis Dent is a 61 y.o. female Established patient, who presents today for her medical conditions/complaints as noted below. Chief Complaint   Patient presents with    Diabetes     patient due for A1c. Pt said she checks her sugars sometimes. HPI:     HPI  Patient here for diabetes follow-up. Patient denies any chest heaviness or pressure sensations. Patient has been having difficulty doing her activities of daily living due to her transverse myelitis and paraplegia. Patient's wheelchair does not accommodate her being able to raise up to do dishes or food preparation. This does put patient at high risk for falls. Reviewed prior notes None  Reviewed previous Labs    LDL Cholesterol (mg/dL)   Date Value   10/19/2021 117     LDL Calculated (mg/dL)   Date Value   02/24/2020 117   12/31/2018 75   02/09/2018 135       (goal LDL is <100)   AST (U/L)   Date Value   10/19/2021 13     ALT (U/L)   Date Value   10/19/2021 13     BUN (mg/dL)   Date Value   10/19/2021 14     Hemoglobin A1C (%)   Date Value   12/02/2021 6.1     TSH (mIU/L)   Date Value   10/19/2021 4.07     BP Readings from Last 3 Encounters:   12/02/21 134/82   05/26/21 130/76   02/24/21 128/74          (goal 120/80)    Past Medical History:   Diagnosis Date    Acute transverse myelitis in demyelinating disease of central nervous system ([facility] [address]     Atrial fibrillation ([facility] [address]     Depression     Hyperlipidemia     Hypertension       Past Surgical History:   Procedure Laterality Date    APPENDECTOMY  1988    CHOLECYSTECTOMY, OPEN  46    FEMUR FRACTURE SURGERY Left 05/2018    HYSTERECTOMY, TOTAL ABDOMINAL  1988       No family history on file.     Social History     Tobacco Use    Smoking status: Never Smoker    Smokeless tobacco: Never Used   Substance Use Topics    Alcohol use: No      Current Outpatient Medications   Medication Sig Dispense Refill    metoprolol tartrate (LOPRESSOR) 25 MG tablet TAKE (1) TABLET EVERY MORNING AND EVENING 56 tablet 0    omeprazole (PRILOSEC) 20 MG delayed release capsule TAKE (1) CAPSULE EVERY MORNING BEFORE BREAKFAST 28 capsule 0    pyridostigmine (MESTINON) 60 MG tablet TAKE (1) TABLET EVERY MORNING 28 tablet 0    DULoxetine (CYMBALTA) 60 MG extended release capsule TAKE (1) CAPSULE EVERY MORNING 28 capsule 0    cyclobenzaprine (FLEXERIL) 10 MG tablet TAKE 1 TABLET 2 TIMES A DAY AS DIRECTED 60 tablet 0    oxyCODONE-acetaminophen (PERCOCET) 5-325 MG per tablet Take 1 tablet by mouth nightly for 30 days.  30 tablet 0    potassium chloride (KLOR-CON) 10 MEQ extended release tablet Take 1 tablet by mouth 2 times daily TAKE (1) TABLET EVERY MORNING 180 tablet 3    nystatin (NYSTATIN) 278174 UNIT/GM powder APPLY TOPICALLY 3 TO 4 TIMES DAILY 60 g 0    rivaroxaban (XARELTO) 20 MG TABS tablet Take 1 tablet by mouth daily (with breakfast) 90 tablet 3    atorvastatin (LIPITOR) 20 MG tablet Take 1 tablet by mouth daily 28 tablet 11    losartan (COZAAR) 25 MG tablet Take 1 tablet by mouth daily 28 tablet 11    oxybutynin (DITROPAN-XL) 10 MG extended release tablet       metFORMIN (GLUCOPHAGE) 500 MG tablet Take 1 tablet by mouth daily (with breakfast) 90 tablet 3    promethazine (PHENERGAN) 25 MG tablet TAKE (1) TABLET FOUR TIMES A DAY AS NEEDED FOR NAUSEA 20 tablet 0    RA HEMORRHOIDAL 1-0.25-14.4-15 % CREA rectal cream apply TO RECTUM AREA once daily if needed for HEMMORROIDS  0    TRUEPLUS LANCETS 30G MISC use as directed TESTING ONCE A WEEK  0    TRUE METRIX BLOOD GLUCOSE TEST strip TEST ONCE A WEEK  0    RA ALLERGY 25 MG tablet take 1 tablet by mouth three times a day if needed for itching  0    Blood Glucose Monitoring Suppl (TRUE METRIX METER) w/Device KIT use as directed ONCE A WEEK  0    RA ACETAMINOPHEN 325 MG tablet take 1 tablet by mouth every 4 hours if needed for **MILD** PAIN/...  (REFER TO PRESCRIPTION NOTES). 0    furosemide (LASIX) 40 MG tablet TAKE (1) TABLET EVERY MORNING (Patient not taking: Reported on 12/2/2021) 28 tablet 0    cephALEXin (KEFLEX) 500 MG capsule Take 1 capsule by mouth 4 times daily (Patient not taking: Reported on 12/2/2021) 20 capsule 0    omeprazole (PRILOSEC OTC) 20 MG tablet Take 20 mg by mouth every morning (before breakfast) (Patient not taking: Reported on 12/2/2021)      baclofen (LIORESAL) 10 MG tablet Take 1 tablet by mouth 3 times daily (Patient not taking: Reported on 12/2/2021) 90 tablet 5    ciclopirox (LOPROX) 0.77 % cream Apply topically 2 times daily. (Patient not taking: Reported on 12/2/2021) 90 g 1    Multiple Vitamin (MULTIVITAMIN) tablet TAKE 1 TABLET ONCE DAILY (Patient not taking: Reported on 12/2/2021) 28 tablet 3    Multiple Vitamins-Minerals (RA CENTRAL-HANNA SENIOR) TABS take 1 tablet by mouth once daily for PREVENTATIVE (Patient not taking: Reported on 12/2/2021)  0    hydrocortisone 2.5 % cream apply to RIGHT HAND AND ARM three times a day if needed for itching  0     No current facility-administered medications for this visit.      Allergies   Allergen Reactions    Compazine [Prochlorperazine]      Cause neurological sx    Sulfa Antibiotics      Childhood allergy       Health Maintenance   Topic Date Due    Diabetic retinal exam  Never done    HIV screen  Never done    Diabetic microalbuminuria test  Never done    Colon cancer screen colonoscopy  Never done    Diabetic foot exam  08/04/2021    Flu vaccine (1) 09/01/2021    Annual Wellness Visit (AWV)  05/27/2022    Lipid screen  10/19/2022    Potassium monitoring  10/19/2022    Creatinine monitoring  10/19/2022    A1C test (Diabetic or Prediabetic)  12/02/2022    Pneumococcal 0-64 years Vaccine (2 of 2 - PPSV23) 12/20/2022    Breast cancer screen  10/19/2023    DTaP/Tdap/Td vaccine (3 - Td or Tdap) 09/20/2028    Shingles Vaccine  Completed    COVID-19 Vaccine  Completed    Hepatitis C screen  Completed    Hepatitis A vaccine  Aged Out    Hib vaccine  Aged Out    Meningococcal (ACWY) vaccine  Aged Out       Subjective:      Review of Systems   Constitutional: Negative for chills and fever. HENT: Negative for rhinorrhea and sore throat. Eyes: Negative for discharge and redness. Respiratory: Negative for cough, shortness of breath and wheezing. Cardiovascular: Negative for chest pain and palpitations. Gastrointestinal: Negative for abdominal pain, diarrhea, nausea and vomiting. Genitourinary: Negative for dysuria and frequency. Musculoskeletal: Negative for arthralgias and myalgias. Neurological: Negative for dizziness, light-headedness and headaches. Psychiatric/Behavioral: Negative for sleep disturbance. Objective:     /82   Pulse 96   Ht 5' 3\" (1.6 m)   SpO2 96%   BMI 35.43 kg/m²   Physical Exam  Vitals and nursing note reviewed. Constitutional:       General: She is not in acute distress. Appearance: She is well-developed. She is not ill-appearing. HENT:      Head: Normocephalic and atraumatic. Right Ear: External ear normal.      Left Ear: External ear normal.   Eyes:      General: No scleral icterus. Right eye: No discharge. Left eye: No discharge. Conjunctiva/sclera: Conjunctivae normal.      Pupils: Pupils are equal, round, and reactive to light. Neck:      Thyroid: No thyromegaly. Trachea: No tracheal deviation. Cardiovascular:      Rate and Rhythm: Normal rate and regular rhythm. Heart sounds: Normal heart sounds. Pulmonary:      Effort: Pulmonary effort is normal. No respiratory distress. Breath sounds: Normal breath sounds. No wheezing. Musculoskeletal:      Right lower leg: Edema present. Left lower leg: Edema present. Lymphadenopathy:      Cervical: No cervical adenopathy. Skin:     General: Skin is warm. Findings: No rash. Neurological:      Mental Status: She is alert and oriented to person, place, and time. Comments: Patient with strength of 0/5 lower extremities. 2/5 upper extremities. Psychiatric:         Mood and Affect: Mood normal.         Behavior: Behavior normal.         Thought Content: Thought content normal.         Assessment:       Diagnosis Orders   1. Controlled type 2 diabetes mellitus without complication, without long-term current use of insulin ([facility])  POCT glycosylated hemoglobin (Hb A1C)    Microalbumin, Ur   2. Encounter for screening for malignant neoplasm of colon  Cologuard   3. Transverse myelitis ([facility]     4. Paraplegia, complete ([facility]     5. Lymphedema          Plan:    Cologuatahmina ordered  Flu shot today  Continue present medications as is  Patient using Percocet mostly at night to help sleep and due to pain in her legs. Patient continuing to have issues with insurance company in regards to lymphedema boots. They did work well for her when she was in the skilled nursing facility. Patient still with significant swelling mostly on the right leg. Believe patient does require a lift wheelchair. Is having difficulty doing her dishes at home as well as food preparation secondary to her paraplegia and not able to reach successfully for food or cleaning supplies. Return in about 3 months (around 3/2/2022). Orders Placed This Encounter   Procedures    [facility]     This test is performed by an external laboratory and is used for result attachment only. It is required that this order requisition be faxed to: [employer] @@ [phone]. See [email or web address] for further information.      Standing Status:   Future     Standing Expiration Date:   2/2/2022    INFLUENZA, MDCK QUADV, 2 YRS AND OLDER, IM, PF, PREFILL SYR OR SDV, 0.5ML (FLUCELVAX QUADV, PF)    Microalbumin, Ur     Standing Status:   Future     Standing Expiration Date:   12/2/2022    POCT glycosylated hemoglobin (Hb A1C) No orders of the defined types were placed in this encounter. Patient given educational materials - see patient instructions. Discussed use, benefit, and side effects of prescribed medications. All patient questions answered. Pt voiced understanding. Reviewed health maintenance. Instructed to continue current medications, diet andexercise. Patient agreed with treatment plan. Follow up as directed.      Electronicallysigned by Rosanna Strange MD on 12/2/2021 at 11:48 AM

## 2021-12-27 DIAGNOSIS — G37.3 TRANSVERSE MYELITIS (HCC): ICD-10-CM

## 2021-12-27 RX ORDER — OXYCODONE HYDROCHLORIDE AND ACETAMINOPHEN 5; 325 MG/1; MG/1
1 TABLET ORAL NIGHTLY
Qty: 30 TABLET | Refills: 0 | Status: SHIPPED | OUTPATIENT
Start: 2021-12-27 | End: 2022-01-31 | Stop reason: SDUPTHER

## 2021-12-30 DIAGNOSIS — K21.9 GASTROESOPHAGEAL REFLUX DISEASE WITHOUT ESOPHAGITIS: ICD-10-CM

## 2021-12-30 RX ORDER — FUROSEMIDE 40 MG/1
TABLET ORAL
Qty: 28 TABLET | Refills: 0 | Status: SHIPPED | OUTPATIENT
Start: 2021-12-30 | End: 2022-01-31

## 2021-12-30 RX ORDER — CYCLOBENZAPRINE HCL 10 MG
TABLET ORAL
Qty: 60 TABLET | Refills: 0 | Status: SHIPPED | OUTPATIENT
Start: 2021-12-30 | End: 2022-01-31

## 2021-12-30 RX ORDER — OMEPRAZOLE 20 MG/1
CAPSULE, DELAYED RELEASE ORAL
Qty: 28 CAPSULE | Refills: 0 | Status: SHIPPED | OUTPATIENT
Start: 2021-12-30 | End: 2022-01-31

## 2021-12-30 RX ORDER — DULOXETIN HYDROCHLORIDE 60 MG/1
CAPSULE, DELAYED RELEASE ORAL
Qty: 28 CAPSULE | Refills: 0 | Status: SHIPPED | OUTPATIENT
Start: 2021-12-30 | End: 2022-01-31

## 2021-12-30 RX ORDER — PYRIDOSTIGMINE BROMIDE 60 MG/1
TABLET ORAL
Qty: 28 TABLET | Refills: 0 | Status: SHIPPED | OUTPATIENT
Start: 2021-12-30 | End: 2022-01-31

## 2022-01-28 DIAGNOSIS — K21.9 GASTROESOPHAGEAL REFLUX DISEASE WITHOUT ESOPHAGITIS: ICD-10-CM

## 2022-01-31 DIAGNOSIS — G37.3 TRANSVERSE MYELITIS (HCC): ICD-10-CM

## 2022-01-31 RX ORDER — CYCLOBENZAPRINE HCL 10 MG
TABLET ORAL
Qty: 60 TABLET | Refills: 0 | Status: SHIPPED | OUTPATIENT
Start: 2022-01-31 | End: 2022-03-02

## 2022-01-31 RX ORDER — FUROSEMIDE 40 MG/1
TABLET ORAL
Qty: 28 TABLET | Refills: 0 | Status: SHIPPED | OUTPATIENT
Start: 2022-01-31 | End: 2022-03-02

## 2022-01-31 RX ORDER — DULOXETIN HYDROCHLORIDE 60 MG/1
CAPSULE, DELAYED RELEASE ORAL
Qty: 28 CAPSULE | Refills: 0 | Status: SHIPPED | OUTPATIENT
Start: 2022-01-31 | End: 2022-03-02

## 2022-01-31 RX ORDER — OMEPRAZOLE 20 MG/1
CAPSULE, DELAYED RELEASE ORAL
Qty: 28 CAPSULE | Refills: 0 | Status: SHIPPED | OUTPATIENT
Start: 2022-01-31 | End: 2022-03-02

## 2022-01-31 RX ORDER — PYRIDOSTIGMINE BROMIDE 60 MG/1
TABLET ORAL
Qty: 28 TABLET | Refills: 0 | Status: SHIPPED | OUTPATIENT
Start: 2022-01-31 | End: 2022-03-02

## 2022-01-31 RX ORDER — OXYCODONE HYDROCHLORIDE AND ACETAMINOPHEN 5; 325 MG/1; MG/1
1 TABLET ORAL NIGHTLY
Qty: 30 TABLET | Refills: 0 | Status: SHIPPED | OUTPATIENT
Start: 2022-01-31 | End: 2022-03-04 | Stop reason: SDUPTHER

## 2022-02-24 ENCOUNTER — TELEPHONE (OUTPATIENT)
Dept: PRIMARY CARE CLINIC | Age: 67
End: 2022-02-24

## 2022-02-24 NOTE — TELEPHONE ENCOUNTER
Patient is calling stating that she spoke with colton from the [address] [place] [address] that colton told the patient that he would like to speak with  to figure out what exactly needs to be done . So patient is giving us the information so Azael Klein can be called .     Azael Klein   [phone] Ext [phone]

## 2022-02-25 NOTE — TELEPHONE ENCOUNTER
Left detailed vm for Maria Victoria Rivera letting him know we need to know what he recommends for tx to call if he has any questions.

## 2022-03-01 DIAGNOSIS — K21.9 GASTROESOPHAGEAL REFLUX DISEASE WITHOUT ESOPHAGITIS: ICD-10-CM

## 2022-03-01 DIAGNOSIS — I10 ESSENTIAL HYPERTENSION: ICD-10-CM

## 2022-03-01 DIAGNOSIS — E11.9 CONTROLLED TYPE 2 DIABETES MELLITUS WITHOUT COMPLICATION, WITHOUT LONG-TERM CURRENT USE OF INSULIN (HCC): ICD-10-CM

## 2022-03-02 RX ORDER — LOSARTAN POTASSIUM 25 MG/1
TABLET ORAL
Qty: 28 TABLET | Refills: 0 | Status: SHIPPED | OUTPATIENT
Start: 2022-03-02 | End: 2022-05-11

## 2022-03-02 RX ORDER — CYCLOBENZAPRINE HCL 10 MG
TABLET ORAL
Qty: 60 TABLET | Refills: 0 | Status: SHIPPED | OUTPATIENT
Start: 2022-03-02 | End: 2022-05-11

## 2022-03-02 RX ORDER — PYRIDOSTIGMINE BROMIDE 60 MG/1
TABLET ORAL
Qty: 28 TABLET | Refills: 0 | Status: SHIPPED | OUTPATIENT
Start: 2022-03-02 | End: 2022-05-11

## 2022-03-02 RX ORDER — FUROSEMIDE 40 MG/1
TABLET ORAL
Qty: 28 TABLET | Refills: 0 | Status: SHIPPED | OUTPATIENT
Start: 2022-03-02 | End: 2022-05-11

## 2022-03-02 RX ORDER — OMEPRAZOLE 20 MG/1
CAPSULE, DELAYED RELEASE ORAL
Qty: 28 CAPSULE | Refills: 0 | Status: SHIPPED | OUTPATIENT
Start: 2022-03-02 | End: 2022-05-11

## 2022-03-02 RX ORDER — ATORVASTATIN CALCIUM 20 MG/1
TABLET, FILM COATED ORAL
Qty: 28 TABLET | Refills: 0 | Status: SHIPPED | OUTPATIENT
Start: 2022-03-02 | End: 2022-05-11

## 2022-03-02 RX ORDER — DULOXETIN HYDROCHLORIDE 60 MG/1
CAPSULE, DELAYED RELEASE ORAL
Qty: 28 CAPSULE | Refills: 0 | Status: SHIPPED | OUTPATIENT
Start: 2022-03-02 | End: 2022-05-11

## 2022-03-02 NOTE — TELEPHONE ENCOUNTER
Sully Arminda is not from [facility] clinic he is a DME supplier. He states patient can go to lymphodema clinic or can be seen by Dr. Anjum Howell for initial evaluation and then again in 4 weeks so she can get covered by insurance for lymphodema pump. He will be faxing forms to me if she decides for Dr. Anjum Howell to evaluate. Called patient to see what she would like to do, aid answered and said she was busy. When patient calls back I can speak with patient.

## 2022-03-04 ENCOUNTER — TELEPHONE (OUTPATIENT)
Dept: PRIMARY CARE CLINIC | Age: 67
End: 2022-03-04

## 2022-03-04 DIAGNOSIS — G37.3 TRANSVERSE MYELITIS (HCC): ICD-10-CM

## 2022-03-04 RX ORDER — OXYCODONE HYDROCHLORIDE AND ACETAMINOPHEN 5; 325 MG/1; MG/1
1 TABLET ORAL NIGHTLY
Qty: 30 TABLET | Refills: 0 | Status: SHIPPED | OUTPATIENT
Start: 2022-03-04 | End: 2022-04-08 | Stop reason: SDUPTHER

## 2022-03-04 NOTE — TELEPHONE ENCOUNTER
----- Message from Azalia Hale sent at 3/4/2022 10:25 AM EST -----  Subject: Refill Request    QUESTIONS  Name of Medication? oxyCODONE-acetaminophen (PERCOCET) 5-325 MG per tablet  Patient-reported dosage and instructions? One per night  How many days do you have left? 0  Preferred Pharmacy? [address] [place] [address] phone number (if available)? [phone]  ---------------------------------------------------------------------------  --------------  CALL BACK INFO  What is the best way for the office to contact you? OK to leave message on   voicemail  Preferred Call Back Phone Number?  [phone]

## 2022-04-07 DIAGNOSIS — G37.3 TRANSVERSE MYELITIS (HCC): ICD-10-CM

## 2022-04-08 RX ORDER — OXYCODONE HYDROCHLORIDE AND ACETAMINOPHEN 5; 325 MG/1; MG/1
1 TABLET ORAL NIGHTLY
Qty: 30 TABLET | Refills: 0 | Status: SHIPPED | OUTPATIENT
Start: 2022-04-08 | End: 2022-05-11 | Stop reason: SDUPTHER

## 2022-04-19 ENCOUNTER — OFFICE VISIT (OUTPATIENT)
Dept: PRIMARY CARE CLINIC | Age: 67
End: 2022-04-19
Payer: COMMERCIAL

## 2022-04-19 VITALS — SYSTOLIC BLOOD PRESSURE: 138 MMHG | HEART RATE: 52 BPM | OXYGEN SATURATION: 98 % | DIASTOLIC BLOOD PRESSURE: 88 MMHG

## 2022-04-19 DIAGNOSIS — Z23 NEED FOR VIRAL IMMUNIZATION: ICD-10-CM

## 2022-04-19 DIAGNOSIS — E11.9 CONTROLLED TYPE 2 DIABETES MELLITUS WITHOUT COMPLICATION, WITHOUT LONG-TERM CURRENT USE OF INSULIN (HCC): Primary | ICD-10-CM

## 2022-04-19 DIAGNOSIS — I89.0 LYMPHEDEMA OF BOTH LOWER EXTREMITIES: ICD-10-CM

## 2022-04-19 DIAGNOSIS — G82.21 PARAPLEGIA, COMPLETE (HCC): ICD-10-CM

## 2022-04-19 LAB — HBA1C MFR BLD: 6 %

## 2022-04-19 PROCEDURE — 83036 HEMOGLOBIN GLYCOSYLATED A1C: CPT | Performed by: FAMILY MEDICINE

## 2022-04-19 PROCEDURE — 99214 OFFICE O/P EST MOD 30 MIN: CPT | Performed by: FAMILY MEDICINE

## 2022-04-19 PROCEDURE — 90677 PCV20 VACCINE IM: CPT | Performed by: FAMILY MEDICINE

## 2022-04-19 PROCEDURE — 3044F HG A1C LEVEL LT 7.0%: CPT | Performed by: FAMILY MEDICINE

## 2022-04-19 RX ORDER — POTASSIUM CHLORIDE 750 MG/1
10 TABLET, EXTENDED RELEASE ORAL DAILY
COMMUNITY
Start: 2022-02-07 | End: 2022-04-19

## 2022-04-19 SDOH — ECONOMIC STABILITY: FOOD INSECURITY: WITHIN THE PAST 12 MONTHS, YOU WORRIED THAT YOUR FOOD WOULD RUN OUT BEFORE YOU GOT MONEY TO BUY MORE.: NEVER TRUE

## 2022-04-19 SDOH — ECONOMIC STABILITY: FOOD INSECURITY: WITHIN THE PAST 12 MONTHS, THE FOOD YOU BOUGHT JUST DIDN'T LAST AND YOU DIDN'T HAVE MONEY TO GET MORE.: NEVER TRUE

## 2022-04-19 ASSESSMENT — ENCOUNTER SYMPTOMS
ABDOMINAL PAIN: 0
SHORTNESS OF BREATH: 0
EYE REDNESS: 0
SORE THROAT: 0
NAUSEA: 0
EYE DISCHARGE: 0
WHEEZING: 0
RHINORRHEA: 0
COUGH: 0
DIARRHEA: 0
VOMITING: 0

## 2022-04-19 ASSESSMENT — SOCIAL DETERMINANTS OF HEALTH (SDOH): HOW HARD IS IT FOR YOU TO PAY FOR THE VERY BASICS LIKE FOOD, HOUSING, MEDICAL CARE, AND HEATING?: NOT HARD AT ALL

## 2022-04-19 NOTE — PROGRESS NOTES
[address] [facility]  [address] [facility] [address]  Dept: [phone]    Kyra Stroud is a 74 y.o. female Established patient, who presents today for her medical conditions/complaints as noted below. Chief Complaint   Patient presents with    Diabetes     Patient doesn't usually check blood sugar at home    Hypertension     Patient doesn't check B/P at home       HPI:     HPI  Pt here for face to face for lymphedema boots. Pt is wheelchair bound, not able to ambulate. Pt has been using stockings, with no improvement. Develops skin breakdown. Pt had lymphedema boots before at SNF with good improvement. Has marked improvement in swelling, less skin infection. Patient has been trying to elevate her feet as best she can but being paraplegic, limits her ability. Patient has been trying low-salt diet for her lymphedema. Reviewed prior notes None  Reviewed previous Labs    LDL Cholesterol (mg/dL)   Date Value   10/19/2021 117     LDL Calculated (mg/dL)   Date Value   02/24/2020 117   12/31/2018 75   02/09/2018 135       (goal LDL is <100)   AST (U/L)   Date Value   10/19/2021 13     ALT (U/L)   Date Value   10/19/2021 13     BUN (mg/dL)   Date Value   10/19/2021 14     Hemoglobin A1C (%)   Date Value   04/19/2022 6.0     TSH (mIU/L)   Date Value   10/19/2021 4.07     BP Readings from Last 3 Encounters:   04/19/22 138/88   12/02/21 134/82   05/26/21 130/76          (goal 120/80)    Past Medical History:   Diagnosis Date    Acute transverse myelitis in demyelinating disease of central nervous system ([address]     Atrial fibrillation ([facility]     Depression     Hyperlipidemia     Hypertension       Past Surgical History:   Procedure Laterality Date    APPENDECTOMY  1988    CHOLECYSTECTOMY, OPEN  48    FEMUR FRACTURE SURGERY Left 05/2018    HYSTERECTOMY, TOTAL ABDOMINAL  1988       No family history on file.     Social History     Tobacco Use    Smoking status: Never Smoker    Smokeless tobacco: Never Used   Substance Use Topics    Alcohol use: No      Current Outpatient Medications   Medication Sig Dispense Refill    oxyCODONE-acetaminophen (PERCOCET) 5-325 MG per tablet Take 1 tablet by mouth nightly for 30 days. 30 tablet 0    metFORMIN (GLUCOPHAGE) 500 MG tablet TAKE (1) TABLET EVERY MORNING (WITH BREAKFAST) 28 tablet 0    cyclobenzaprine (FLEXERIL) 10 MG tablet TAKE 1 TABLET 2 TIMES A DAY AS DIRECTED 60 tablet 0    DULoxetine (CYMBALTA) 60 MG extended release capsule TAKE (1) CAPSULE EVERY MORNING 28 capsule 0    furosemide (LASIX) 40 MG tablet TAKE (1) TABLET EVERY MORNING 28 tablet 0    pyridostigmine (MESTINON) 60 MG tablet TAKE (1) TABLET EVERY MORNING 28 tablet 0    omeprazole (PRILOSEC) 20 MG delayed release capsule TAKE (1) CAPSULE EVERY MORNING BEFORE BREAKFAST 28 capsule 0    metoprolol tartrate (LOPRESSOR) 25 MG tablet TAKE (1) TABLET EVERY MORNING AND EVENING 56 tablet 0    losartan (COZAAR) 25 MG tablet TAKE (1) TABLET EVERY MORNING 28 tablet 0    atorvastatin (LIPITOR) 20 MG tablet TAKE (1) TABLET IN THE EVENING.  28 tablet 0    potassium chloride (KLOR-CON) 10 MEQ extended release tablet Take 1 tablet by mouth 2 times daily TAKE (1) TABLET EVERY MORNING 180 tablet 3    nystatin (NYSTATIN) 933416 UNIT/GM powder APPLY TOPICALLY 3 TO 4 TIMES DAILY 60 g 0    rivaroxaban (XARELTO) 20 MG TABS tablet Take 1 tablet by mouth daily (with breakfast) 90 tablet 3    oxybutynin (DITROPAN-XL) 10 MG extended release tablet       promethazine (PHENERGAN) 25 MG tablet TAKE (1) TABLET FOUR TIMES A DAY AS NEEDED FOR NAUSEA 20 tablet 0    RA HEMORRHOIDAL 1-0.25-14.4-15 % CREA rectal cream apply TO RECTUM AREA once daily if needed for HEMMORROIDS  0    TRUEPLUS LANCETS 30G MISC use as directed TESTING ONCE A WEEK  0    hydrocortisone 2.5 % cream apply to RIGHT HAND AND ARM three times a day if needed for itching  0    TRUE METRIX BLOOD GLUCOSE TEST strip TEST ONCE A WEEK  0    RA ALLERGY 25 MG tablet take 1 tablet by mouth three times a day if needed for itching  0    Blood Glucose Monitoring Suppl (TRUE METRIX METER) w/Device KIT use as directed ONCE A WEEK  0    RA ACETAMINOPHEN 325 MG tablet take 1 tablet by mouth every 4 hours if needed for **MILD** PAIN/...  (REFER TO PRESCRIPTION NOTES). 0     No current facility-administered medications for this visit. Allergies   Allergen Reactions    Compazine [Prochlorperazine]      Cause neurological sx    Sulfa Antibiotics      Childhood allergy       Health Maintenance   Topic Date Due    HIV screen  Never done    Diabetic microalbuminuria test  Never done    Diabetic retinal exam  Never done    Colorectal Cancer Screen  Never done    DEXA (modify frequency per FRAX score)  Never done    Diabetic foot exam  08/04/2021    Annual Wellness Visit (AWV)  05/27/2022    Depression Screen  08/26/2022    Lipid screen  10/19/2022    Potassium monitoring  10/19/2022    Creatinine monitoring  10/19/2022    A1C test (Diabetic or Prediabetic)  04/19/2023    Breast cancer screen  10/19/2023    DTaP/Tdap/Td vaccine (3 - Td or Tdap) 09/20/2028    Flu vaccine  Completed    Shingles Vaccine  Completed    Pneumococcal 65+ years Vaccine  Completed    COVID-19 Vaccine  Completed    Hepatitis C screen  Completed    Hepatitis A vaccine  Aged Out    Hib vaccine  Aged Out    Meningococcal (ACWY) vaccine  Aged Out       Subjective:      Review of Systems   Constitutional: Negative for chills and fever. HENT: Negative for rhinorrhea and sore throat. Eyes: Negative for discharge and redness. Respiratory: Negative for cough, shortness of breath and wheezing. Cardiovascular: Positive for leg swelling. Negative for chest pain and palpitations. Gastrointestinal: Negative for abdominal pain, diarrhea, nausea and vomiting. Genitourinary: Negative for dysuria and frequency. Musculoskeletal: Negative for arthralgias and myalgias. Neurological: Negative for dizziness, light-headedness and headaches. Psychiatric/Behavioral: Negative for sleep disturbance. Objective:     /88   Pulse 52   SpO2 98%   Physical Exam  Vitals and nursing note reviewed. Constitutional:       General: She is not in acute distress. Appearance: She is well-developed. She is not ill-appearing. HENT:      Head: Normocephalic and atraumatic. Right Ear: External ear normal.      Left Ear: External ear normal.   Eyes:      General: No scleral icterus. Right eye: No discharge. Left eye: No discharge. Conjunctiva/sclera: Conjunctivae normal.      Pupils: Pupils are equal, round, and reactive to light. Neck:      Thyroid: No thyromegaly. Trachea: No tracheal deviation. Cardiovascular:      Rate and Rhythm: Normal rate and regular rhythm. Heart sounds: Normal heart sounds. Pulmonary:      Effort: Pulmonary effort is normal. No respiratory distress. Breath sounds: Normal breath sounds. No wheezing. Musculoskeletal:      Right lower leg: Edema present. Left lower leg: Edema present. Comments: 3+ nonpitting edema to the right leg, 2+ nonpitting edema to the left. Lymphadenopathy:      Cervical: No cervical adenopathy. Skin:     General: Skin is warm. Findings: No rash. Neurological:      Mental Status: She is alert and oriented to person, place, and time. Psychiatric:         Mood and Affect: Mood normal.         Behavior: Behavior normal.         Thought Content: Thought content normal.         Assessment:       Diagnosis Orders   1. Controlled type 2 diabetes mellitus without complication, without long-term current use of insulin ([facility])  POCT glycosylated hemoglobin (Hb A1C)    Microalbumin, Ur   2. Need for viral immunization  Pneumococcal Conjugate PCV20, PF (Prevnar 20)   3. Paraplegia, complete ([address]     4.  Lymphedema of both lower extremities          Plan:    Prevnar 20 today  Urine for microalbumin ordered  Diabetes mellitus under good control  Will try again for lymphedema boots. Definitely has helped patient in the past.    Return in about 3 months (around 7/19/2022). Orders Placed This Encounter   Procedures    Pneumococcal Conjugate PCV20, PF (Prevnar 20)    Microalbumin, Ur     Standing Status:   Future     Standing Expiration Date:   4/19/2023    POCT glycosylated hemoglobin (Hb A1C)     No orders of the defined types were placed in this encounter. Patient given educational materials - see patient instructions. Discussed use, benefit, and side effects of prescribed medications. All patient questions answered. Pt voiced understanding. Reviewed health maintenance. Instructed to continue current medications, diet andexercise. Patient agreed with treatment plan. Follow up as directed.      Electronicallysigned by Kamron Egan MD on 4/19/2022 at 12:10 PM

## 2022-04-26 ENCOUNTER — NURSE ONLY (OUTPATIENT)
Dept: PRIMARY CARE CLINIC | Age: 67
End: 2022-04-26

## 2022-04-26 NOTE — PROGRESS NOTES
Patient came in today for bilateral leg measurements to obtain lymphedema pumps. 4/19/22 measurements:    Left ankle: 11.5 inches   Left calf: 19.5 inches   Right ankle: 12.25 inches   Right calf: 22.5 inches    4/26/22 measurements:   Left ankle: 11.5 inches   Left calf: 17 inches   Right ankle: 12 inches   Right calf: 21 inches      Consulted with both Dr Harvey Dove and Sharad Avelar. Per Sharad Avelar, she will fax information needed to lymphedema clinic. I asked that patient call if she doesn't hear from them. Verbal understanding per patient. Per patients incontinence, she is unable to void for a microalbumin.

## 2022-04-28 ENCOUNTER — TELEPHONE (OUTPATIENT)
Dept: PRIMARY CARE CLINIC | Age: 67
End: 2022-04-28

## 2022-04-28 NOTE — TELEPHONE ENCOUNTER
Cierra Rivera from [email or web address] wanted to let you know that in order to get her lymphedema pump they will need 4 consecutive weeks of notes. She will need another note around 05/19/22. We sent the order to [address] Spoke to pt she said she is not sure who her pump is through but she see's [facility] lymphedema clinic and she spoke to a rep that comes there about the pump. Called and lvm for Calderon Giordano to return my call. Not sure the notes are coming from our office for this as we have not seen her for 4 consecutive weeks.

## 2022-05-11 DIAGNOSIS — K21.9 GASTROESOPHAGEAL REFLUX DISEASE WITHOUT ESOPHAGITIS: ICD-10-CM

## 2022-05-11 DIAGNOSIS — G37.3 TRANSVERSE MYELITIS (HCC): ICD-10-CM

## 2022-05-11 DIAGNOSIS — E11.9 CONTROLLED TYPE 2 DIABETES MELLITUS WITHOUT COMPLICATION, WITHOUT LONG-TERM CURRENT USE OF INSULIN (HCC): ICD-10-CM

## 2022-05-11 DIAGNOSIS — I10 ESSENTIAL HYPERTENSION: ICD-10-CM

## 2022-05-11 RX ORDER — OMEPRAZOLE 20 MG/1
CAPSULE, DELAYED RELEASE ORAL
Qty: 28 CAPSULE | Refills: 0 | Status: SHIPPED | OUTPATIENT
Start: 2022-05-11 | End: 2022-06-08

## 2022-05-11 RX ORDER — LOSARTAN POTASSIUM 25 MG/1
TABLET ORAL
Qty: 28 TABLET | Refills: 0 | Status: SHIPPED | OUTPATIENT
Start: 2022-05-11 | End: 2022-06-08

## 2022-05-11 RX ORDER — PYRIDOSTIGMINE BROMIDE 60 MG/1
TABLET ORAL
Qty: 28 TABLET | Refills: 0 | Status: SHIPPED | OUTPATIENT
Start: 2022-05-11 | End: 2022-06-08

## 2022-05-11 RX ORDER — CYCLOBENZAPRINE HCL 10 MG
TABLET ORAL
Qty: 60 TABLET | Refills: 0 | Status: SHIPPED | OUTPATIENT
Start: 2022-05-11 | End: 2022-06-08

## 2022-05-11 RX ORDER — OXYCODONE HYDROCHLORIDE AND ACETAMINOPHEN 5; 325 MG/1; MG/1
1 TABLET ORAL NIGHTLY
Qty: 30 TABLET | Refills: 0 | Status: SHIPPED | OUTPATIENT
Start: 2022-05-11 | End: 2022-06-10 | Stop reason: SDUPTHER

## 2022-05-11 RX ORDER — ATORVASTATIN CALCIUM 20 MG/1
TABLET, FILM COATED ORAL
Qty: 28 TABLET | Refills: 0 | Status: SHIPPED | OUTPATIENT
Start: 2022-05-11 | End: 2022-06-08

## 2022-05-11 RX ORDER — FUROSEMIDE 40 MG/1
TABLET ORAL
Qty: 28 TABLET | Refills: 0 | Status: SHIPPED | OUTPATIENT
Start: 2022-05-11 | End: 2022-06-08

## 2022-05-11 RX ORDER — DULOXETIN HYDROCHLORIDE 60 MG/1
CAPSULE, DELAYED RELEASE ORAL
Qty: 28 CAPSULE | Refills: 0 | Status: SHIPPED | OUTPATIENT
Start: 2022-05-11 | End: 2022-06-08

## 2022-06-02 ENCOUNTER — TELEPHONE (OUTPATIENT)
Dept: PRIMARY CARE CLINIC | Age: 67
End: 2022-06-02

## 2022-06-08 DIAGNOSIS — E11.9 CONTROLLED TYPE 2 DIABETES MELLITUS WITHOUT COMPLICATION, WITHOUT LONG-TERM CURRENT USE OF INSULIN (HCC): ICD-10-CM

## 2022-06-08 DIAGNOSIS — K21.9 GASTROESOPHAGEAL REFLUX DISEASE WITHOUT ESOPHAGITIS: ICD-10-CM

## 2022-06-08 DIAGNOSIS — I10 ESSENTIAL HYPERTENSION: ICD-10-CM

## 2022-06-08 RX ORDER — DULOXETIN HYDROCHLORIDE 60 MG/1
CAPSULE, DELAYED RELEASE ORAL
Qty: 28 CAPSULE | Refills: 0 | Status: SHIPPED | OUTPATIENT
Start: 2022-06-08 | End: 2022-07-14

## 2022-06-08 RX ORDER — CYCLOBENZAPRINE HCL 10 MG
TABLET ORAL
Qty: 60 TABLET | Refills: 0 | Status: SHIPPED | OUTPATIENT
Start: 2022-06-08 | End: 2022-07-14

## 2022-06-08 RX ORDER — LOSARTAN POTASSIUM 25 MG/1
TABLET ORAL
Qty: 28 TABLET | Refills: 0 | Status: SHIPPED | OUTPATIENT
Start: 2022-06-08 | End: 2022-07-14

## 2022-06-08 RX ORDER — ATORVASTATIN CALCIUM 20 MG/1
TABLET, FILM COATED ORAL
Qty: 28 TABLET | Refills: 0 | Status: SHIPPED | OUTPATIENT
Start: 2022-06-08 | End: 2022-07-14

## 2022-06-08 RX ORDER — RIVAROXABAN 20 MG/1
TABLET, FILM COATED ORAL
Qty: 28 TABLET | Refills: 0 | Status: SHIPPED | OUTPATIENT
Start: 2022-06-08 | End: 2022-07-14

## 2022-06-08 RX ORDER — FUROSEMIDE 40 MG/1
TABLET ORAL
Qty: 28 TABLET | Refills: 0 | Status: SHIPPED | OUTPATIENT
Start: 2022-06-08 | End: 2022-07-14

## 2022-06-08 RX ORDER — PYRIDOSTIGMINE BROMIDE 60 MG/1
TABLET ORAL
Qty: 28 TABLET | Refills: 0 | Status: SHIPPED | OUTPATIENT
Start: 2022-06-08 | End: 2022-07-14

## 2022-06-08 RX ORDER — OMEPRAZOLE 20 MG/1
CAPSULE, DELAYED RELEASE ORAL
Qty: 28 CAPSULE | Refills: 0 | Status: SHIPPED | OUTPATIENT
Start: 2022-06-08 | End: 2022-07-14

## 2022-06-10 DIAGNOSIS — G37.3 TRANSVERSE MYELITIS (HCC): ICD-10-CM

## 2022-06-10 RX ORDER — OXYCODONE HYDROCHLORIDE AND ACETAMINOPHEN 5; 325 MG/1; MG/1
1 TABLET ORAL NIGHTLY
Qty: 30 TABLET | Refills: 0 | Status: SHIPPED | OUTPATIENT
Start: 2022-06-10 | End: 2022-07-19 | Stop reason: SDUPTHER

## 2022-06-10 NOTE — TELEPHONE ENCOUNTER
Pt asking, if you received the paper yet, re the respite stay? Said that she has not heard anything and starting to worry.

## 2022-06-13 NOTE — TELEPHONE ENCOUNTER
Patient notified, paperwork not received. TAIWO is needing H&P. Last progress note will be faxed to them.

## 2022-07-13 DIAGNOSIS — K21.9 GASTROESOPHAGEAL REFLUX DISEASE WITHOUT ESOPHAGITIS: ICD-10-CM

## 2022-07-13 DIAGNOSIS — I10 ESSENTIAL HYPERTENSION: ICD-10-CM

## 2022-07-13 DIAGNOSIS — E11.9 CONTROLLED TYPE 2 DIABETES MELLITUS WITHOUT COMPLICATION, WITHOUT LONG-TERM CURRENT USE OF INSULIN (HCC): ICD-10-CM

## 2022-07-14 RX ORDER — ATORVASTATIN CALCIUM 20 MG/1
TABLET, FILM COATED ORAL
Qty: 28 TABLET | Refills: 0 | Status: SHIPPED | OUTPATIENT
Start: 2022-07-14 | End: 2022-07-19

## 2022-07-14 RX ORDER — CYCLOBENZAPRINE HCL 10 MG
TABLET ORAL
Qty: 60 TABLET | Refills: 0 | Status: SHIPPED | OUTPATIENT
Start: 2022-07-14 | End: 2022-08-10

## 2022-07-14 RX ORDER — DULOXETIN HYDROCHLORIDE 60 MG/1
CAPSULE, DELAYED RELEASE ORAL
Qty: 28 CAPSULE | Refills: 0 | Status: SHIPPED | OUTPATIENT
Start: 2022-07-14 | End: 2022-07-19

## 2022-07-14 RX ORDER — LOSARTAN POTASSIUM 25 MG/1
TABLET ORAL
Qty: 28 TABLET | Refills: 0 | Status: SHIPPED | OUTPATIENT
Start: 2022-07-14 | End: 2022-07-19

## 2022-07-14 RX ORDER — RIVAROXABAN 20 MG/1
TABLET, FILM COATED ORAL
Qty: 28 TABLET | Refills: 0 | Status: SHIPPED | OUTPATIENT
Start: 2022-07-14 | End: 2022-07-19

## 2022-07-14 RX ORDER — OMEPRAZOLE 20 MG/1
CAPSULE, DELAYED RELEASE ORAL
Qty: 28 CAPSULE | Refills: 0 | Status: SHIPPED | OUTPATIENT
Start: 2022-07-14 | End: 2022-08-10

## 2022-07-14 RX ORDER — PYRIDOSTIGMINE BROMIDE 60 MG/1
TABLET ORAL
Qty: 28 TABLET | Refills: 0 | Status: SHIPPED | OUTPATIENT
Start: 2022-07-14 | End: 2022-08-10

## 2022-07-14 RX ORDER — FUROSEMIDE 40 MG/1
TABLET ORAL
Qty: 28 TABLET | Refills: 0 | Status: SHIPPED | OUTPATIENT
Start: 2022-07-14 | End: 2022-07-19

## 2022-07-19 ENCOUNTER — OFFICE VISIT (OUTPATIENT)
Dept: PRIMARY CARE CLINIC | Age: 67
End: 2022-07-19
Payer: COMMERCIAL

## 2022-07-19 VITALS
WEIGHT: 200 LBS | OXYGEN SATURATION: 97 % | BODY MASS INDEX: 35.44 KG/M2 | SYSTOLIC BLOOD PRESSURE: 120 MMHG | DIASTOLIC BLOOD PRESSURE: 80 MMHG | HEIGHT: 63 IN | HEART RATE: 80 BPM

## 2022-07-19 DIAGNOSIS — E11.9 CONTROLLED TYPE 2 DIABETES MELLITUS WITHOUT COMPLICATION, WITHOUT LONG-TERM CURRENT USE OF INSULIN (HCC): Primary | ICD-10-CM

## 2022-07-19 DIAGNOSIS — G37.3 TRANSVERSE MYELITIS (HCC): ICD-10-CM

## 2022-07-19 DIAGNOSIS — I10 ESSENTIAL HYPERTENSION: ICD-10-CM

## 2022-07-19 PROBLEM — I89.0 LYMPHEDEMA, NOT ELSEWHERE CLASSIFIED: Status: ACTIVE | Noted: 2021-09-23

## 2022-07-19 LAB — HBA1C MFR BLD: 6.2 %

## 2022-07-19 PROCEDURE — 1123F ACP DISCUSS/DSCN MKR DOCD: CPT | Performed by: FAMILY MEDICINE

## 2022-07-19 PROCEDURE — 99213 OFFICE O/P EST LOW 20 MIN: CPT | Performed by: FAMILY MEDICINE

## 2022-07-19 PROCEDURE — 3044F HG A1C LEVEL LT 7.0%: CPT | Performed by: FAMILY MEDICINE

## 2022-07-19 PROCEDURE — 83036 HEMOGLOBIN GLYCOSYLATED A1C: CPT | Performed by: FAMILY MEDICINE

## 2022-07-19 RX ORDER — ATORVASTATIN CALCIUM 20 MG/1
20 TABLET, FILM COATED ORAL EVERY EVENING
Qty: 90 TABLET | Refills: 1 | Status: SHIPPED | OUTPATIENT
Start: 2022-07-19

## 2022-07-19 RX ORDER — DULOXETIN HYDROCHLORIDE 60 MG/1
60 CAPSULE, DELAYED RELEASE ORAL DAILY
Qty: 90 CAPSULE | Refills: 1 | Status: SHIPPED | OUTPATIENT
Start: 2022-07-19

## 2022-07-19 RX ORDER — FUROSEMIDE 40 MG/1
40 TABLET ORAL DAILY
Qty: 90 TABLET | Refills: 1 | Status: SHIPPED | OUTPATIENT
Start: 2022-07-19

## 2022-07-19 RX ORDER — POTASSIUM CHLORIDE 750 MG/1
10 TABLET, FILM COATED, EXTENDED RELEASE ORAL 2 TIMES DAILY
Qty: 180 TABLET | Refills: 3 | Status: SHIPPED | OUTPATIENT
Start: 2022-07-19

## 2022-07-19 RX ORDER — LOSARTAN POTASSIUM 25 MG/1
25 TABLET ORAL DAILY
Qty: 90 TABLET | Refills: 1 | Status: SHIPPED | OUTPATIENT
Start: 2022-07-19

## 2022-07-19 RX ORDER — OXYCODONE HYDROCHLORIDE AND ACETAMINOPHEN 5; 325 MG/1; MG/1
1 TABLET ORAL NIGHTLY
Qty: 30 TABLET | Refills: 0 | Status: SHIPPED | OUTPATIENT
Start: 2022-07-19 | End: 2022-08-18 | Stop reason: SDUPTHER

## 2022-07-19 ASSESSMENT — PATIENT HEALTH QUESTIONNAIRE - PHQ9
SUM OF ALL RESPONSES TO PHQ QUESTIONS 1-9: 0
1. LITTLE INTEREST OR PLEASURE IN DOING THINGS: 0
SUM OF ALL RESPONSES TO PHQ QUESTIONS 1-9: 0
SUM OF ALL RESPONSES TO PHQ9 QUESTIONS 1 & 2: 0
2. FEELING DOWN, DEPRESSED OR HOPELESS: 0

## 2022-07-19 ASSESSMENT — ENCOUNTER SYMPTOMS
VOMITING: 0
SHORTNESS OF BREATH: 0
NAUSEA: 0
DIARRHEA: 0
EYE DISCHARGE: 0
SORE THROAT: 0
EYE REDNESS: 0
RHINORRHEA: 0
COUGH: 0
ABDOMINAL PAIN: 0
WHEEZING: 0

## 2022-07-19 NOTE — PROGRESS NOTES
[address] [facility]  [address]  [facility] [address]  Dept: [phone]    Dany Sunshine is a 74 y.o. female Established patient, who presents today for her medical conditions/complaints as noted below. Chief Complaint   Patient presents with    Diabetes     Patient is here today for DM follow up        HPI:     HPI  Patient here for diabetic recheck. States having no low blood sugar reactions. Not checking her sugars on a regular basis. Still using her Percocet at bedtime for her transverse myelitis. This is more for her leg pain and back pain. Patient still wheelchair-bound. Denies any other changes in medications. Denies any rapid beating of the heartbeat. No lightheadedness or dizziness or near syncope. Has been taking her Xarelto for her atrial fibrillation. Pt here for face to face for lymphedema boots. Pt is wheelchair bound, not able to ambulate. Pt has been using stockings, with no improvement. Develops skin breakdown. Pt had lymphedema boots before at SNF with good improvement. Has marked improvement in swelling, less skin infection. Patient has been trying to elevate her feet as best she can but being paraplegic, limits her ability. Patient has been trying low-salt diet for her lymphedema.   Reviewed prior notes None  Reviewed previous Labs    LDL Cholesterol (mg/dL)   Date Value   10/19/2021 117     LDL Calculated (mg/dL)   Date Value   02/24/2020 117   12/31/2018 75   02/09/2018 135       (goal LDL is <100)   AST (U/L)   Date Value   10/19/2021 13     ALT (U/L)   Date Value   10/19/2021 13     BUN (mg/dL)   Date Value   10/19/2021 14     Hemoglobin A1C (%)   Date Value   07/19/2022 6.2     TSH (mIU/L)   Date Value   10/19/2021 4.07     BP Readings from Last 3 Encounters:   07/19/22 120/80   04/19/22 138/88   12/02/21 134/82          (goal 120/80)    Past Medical History:   Diagnosis Date    Acute transverse myelitis in demyelinating disease of central nervous system ([facility] [address]     Atrial fibrillation ([facility] [address]     Depression     Hyperlipidemia     Hypertension       Past Surgical History:   Procedure Laterality Date    APPENDECTOMY  1988    CHOLECYSTECTOMY, OPEN  1984    FEMUR FRACTURE SURGERY Left 05/2018    HYSTERECTOMY, TOTAL ABDOMINAL (CERVIX REMOVED)  1988       No family history on file. Social History     Tobacco Use    Smoking status: Never    Smokeless tobacco: Never   Substance Use Topics    Alcohol use: No      Current Outpatient Medications   Medication Sig Dispense Refill    metFORMIN (GLUCOPHAGE) 500 MG tablet Take 1 tablet by mouth daily (with breakfast) 90 tablet 1    rivaroxaban (XARELTO) 20 MG TABS tablet Take 1 tablet by mouth daily (with breakfast) 90 tablet 3    DULoxetine (CYMBALTA) 60 MG extended release capsule Take 1 capsule by mouth in the morning. 90 capsule 1    losartan (COZAAR) 25 MG tablet Take 1 tablet by mouth in the morning. 90 tablet 1    metoprolol tartrate (LOPRESSOR) 25 MG tablet Take 1 tablet by mouth in the morning and 1 tablet before bedtime. 180 tablet 1    atorvastatin (LIPITOR) 20 MG tablet Take 1 tablet by mouth every evening 90 tablet 1    furosemide (LASIX) 40 MG tablet Take 1 tablet by mouth in the morning. 90 tablet 1    potassium chloride (KLOR-CON) 10 MEQ extended release tablet Take 1 tablet by mouth in the morning and 1 tablet before bedtime. TAKE (1) TABLET EVERY MORNING. 180 tablet 3    oxyCODONE-acetaminophen (PERCOCET) 5-325 MG per tablet Take 1 tablet by mouth nightly for 30 days.  30 tablet 0    cyclobenzaprine (FLEXERIL) 10 MG tablet TAKE 1 TABLET 2 TIMES A DAY AS DIRECTED 60 tablet 0    pyridostigmine (MESTINON) 60 MG tablet TAKE (1) TABLET EVERY MORNING 28 tablet 0    omeprazole (PRILOSEC) 20 MG delayed release capsule TAKE (1) CAPSULE EVERY MORNING BEFORE BREAKFAST 28 capsule 0    oxybutynin (DITROPAN-XL) 10 MG extended release tablet       promethazine (PHENERGAN) 25 MG tablet TAKE (1) TABLET FOUR TIMES A DAY AS NEEDED FOR NAUSEA 20 tablet 0    RA HEMORRHOIDAL 1-0.25-14.4-15 % CREA rectal cream apply TO RECTUM AREA once daily if needed for HEMMORROIDS  0    TRUEPLUS LANCETS 30G MISC use as directed TESTING ONCE A WEEK  0    TRUE METRIX BLOOD GLUCOSE TEST strip TEST ONCE A WEEK  0    RA ALLERGY 25 MG tablet take 1 tablet by mouth three times a day if needed for itching  0    RA ACETAMINOPHEN 325 MG tablet take 1 tablet by mouth every 4 hours if needed for **MILD** PAIN/...  (REFER TO PRESCRIPTION NOTES). 0    Blood Glucose Monitoring Suppl (TRUE METRIX METER) w/Device KIT use as directed ONCE A WEEK  0     No current facility-administered medications for this visit. Allergies   Allergen Reactions    Compazine [Prochlorperazine]      Cause neurological sx    Sulfa Antibiotics      Childhood allergy       Health Maintenance   Topic Date Due    HIV screen  Never done    Diabetic microalbuminuria test  Never done    Diabetic retinal exam  Never done    Colorectal Cancer Screen  Never done    DEXA (modify frequency per FRAX score)  Never done    Diabetic foot exam  08/04/2021    COVID-19 Vaccine (4 - Booster for Pfizer series) 03/24/2022    Annual Wellness Visit (AWV)  05/27/2022    Depression Screen  08/26/2022    Flu vaccine (1) 09/01/2022    Lipids  10/19/2022    A1C test (Diabetic or Prediabetic)  07/19/2023    Breast cancer screen  10/19/2023    DTaP/Tdap/Td vaccine (3 - Td or Tdap) 09/20/2028    Shingles vaccine  Completed    Pneumococcal 65+ years Vaccine  Completed    Hepatitis C screen  Completed    Hepatitis A vaccine  Aged Out    Hib vaccine  Aged Out    Meningococcal (ACWY) vaccine  Aged Out       Subjective:      Review of Systems   Constitutional:  Negative for chills and fever. HENT:  Negative for rhinorrhea and sore throat. Eyes:  Negative for discharge and redness. Respiratory:  Negative for cough, shortness of breath and wheezing.     Cardiovascular: Negative for chest pain and palpitations. Gastrointestinal:  Negative for abdominal pain, diarrhea, nausea and vomiting. Genitourinary:  Negative for dysuria and frequency. Musculoskeletal:  Negative for arthralgias and myalgias. Neurological:  Negative for dizziness, light-headedness and headaches. Psychiatric/Behavioral:  Negative for sleep disturbance. Objective:     /80   Pulse 80   Ht 5' 3\" (1.6 m)   Wt 200 lb (90.7 kg)   SpO2 97%   BMI 35.43 kg/m²   Physical Exam  Vitals and nursing note reviewed. Constitutional:       General: She is not in acute distress. Appearance: She is well-developed. She is not ill-appearing. HENT:      Head: Normocephalic and atraumatic. Right Ear: External ear normal.      Left Ear: External ear normal.   Eyes:      General: No scleral icterus. Right eye: No discharge. Left eye: No discharge. Conjunctiva/sclera: Conjunctivae normal.   Neck:      Thyroid: No thyromegaly. Trachea: No tracheal deviation. Cardiovascular:      Rate and Rhythm: Normal rate. Rhythm irregular. Heart sounds: Normal heart sounds. Pulmonary:      Effort: Pulmonary effort is normal. No respiratory distress. Breath sounds: Normal breath sounds. No wheezing. Musculoskeletal:      Right lower leg: Edema present. Left lower leg: Edema present. Lymphadenopathy:      Cervical: No cervical adenopathy. Skin:     General: Skin is warm. Findings: No rash. Neurological:      Mental Status: She is alert and oriented to person, place, and time. Psychiatric:         Mood and Affect: Mood normal.         Behavior: Behavior normal.         Thought Content: Thought content normal.       Assessment:       Diagnosis Orders   1. Controlled type 2 diabetes mellitus without complication, without long-term current use of insulin ([facility])  POCT glycosylated hemoglobin (Hb A1C)    atorvastatin (LIPITOR) 20 MG tablet      2.  Essential hypertension  losartan (COZAAR) 25 MG tablet      3. Transverse myelitis ([address]  oxyCODONE-acetaminophen (PERCOCET) 5-325 MG per tablet           Plan:   New Percocet  Renew other medications  Oxycodone for leg pain    Return in about 4 months (around 11/19/2022), or Medicare wellness. Orders Placed This Encounter   Procedures    POCT glycosylated hemoglobin (Hb A1C)     Orders Placed This Encounter   Medications    metFORMIN (GLUCOPHAGE) 500 MG tablet     Sig: Take 1 tablet by mouth daily (with breakfast)     Dispense:  90 tablet     Refill:  1    rivaroxaban (XARELTO) 20 MG TABS tablet     Sig: Take 1 tablet by mouth daily (with breakfast)     Dispense:  90 tablet     Refill:  3    DULoxetine (CYMBALTA) 60 MG extended release capsule     Sig: Take 1 capsule by mouth in the morning. Dispense:  90 capsule     Refill:  1    losartan (COZAAR) 25 MG tablet     Sig: Take 1 tablet by mouth in the morning. Dispense:  90 tablet     Refill:  1    metoprolol tartrate (LOPRESSOR) 25 MG tablet     Sig: Take 1 tablet by mouth in the morning and 1 tablet before bedtime. Dispense:  180 tablet     Refill:  1    atorvastatin (LIPITOR) 20 MG tablet     Sig: Take 1 tablet by mouth every evening     Dispense:  90 tablet     Refill:  1    furosemide (LASIX) 40 MG tablet     Sig: Take 1 tablet by mouth in the morning. Dispense:  90 tablet     Refill:  1    potassium chloride (KLOR-CON) 10 MEQ extended release tablet     Sig: Take 1 tablet by mouth in the morning and 1 tablet before bedtime. TAKE (1) TABLET EVERY MORNING. Dispense:  180 tablet     Refill:  3    oxyCODONE-acetaminophen (PERCOCET) 5-325 MG per tablet     Sig: Take 1 tablet by mouth nightly for 30 days. Dispense:  30 tablet     Refill:  0     Reduce doses taken as pain becomes manageable       Patient given educational materials - see patient instructions. Discussed use, benefit, and side effects of prescribed medications.   All patient questions answered. Pt voiced understanding. Reviewed health maintenance. Instructed to continue current medications, diet andexercise. Patient agreed with treatment plan. Follow up as directed.      Electronicallysigned by Jesus Bray MD on 7/19/2022 at 11:09 AM

## 2022-08-10 DIAGNOSIS — K21.9 GASTROESOPHAGEAL REFLUX DISEASE WITHOUT ESOPHAGITIS: ICD-10-CM

## 2022-08-10 RX ORDER — OMEPRAZOLE 20 MG/1
CAPSULE, DELAYED RELEASE ORAL
Qty: 28 CAPSULE | Refills: 0 | Status: SHIPPED | OUTPATIENT
Start: 2022-08-10 | End: 2022-09-07

## 2022-08-10 RX ORDER — CYCLOBENZAPRINE HCL 10 MG
TABLET ORAL
Qty: 60 TABLET | Refills: 0 | Status: SHIPPED | OUTPATIENT
Start: 2022-08-10 | End: 2022-09-07

## 2022-08-10 RX ORDER — PYRIDOSTIGMINE BROMIDE 60 MG/1
TABLET ORAL
Qty: 28 TABLET | Refills: 0 | Status: SHIPPED | OUTPATIENT
Start: 2022-08-10 | End: 2022-09-07

## 2022-08-12 ENCOUNTER — TELEPHONE (OUTPATIENT)
Dept: PRIMARY CARE CLINIC | Age: 67
End: 2022-08-12

## 2022-08-12 NOTE — TELEPHONE ENCOUNTER
----- Message from Dietrich Gottron sent at 8/11/2022  3:35 PM EDT -----  Subject: Message to Provider    QUESTIONS  Information for Provider? Rene Simons is calling from [employer] needs to know if the doctor has prescribed a lymphedema pump for   this patient. Please call Jett Baron at [phone], ext [phone]. Fax [phone]. Needs demographics page and last 2 visit notes 4 weeks apart.  ---------------------------------------------------------------------------  --------------  Sohail Mendoza INFO  [phone]; OK to leave message on voicemail  ---------------------------------------------------------------------------  --------------  SCRIPT ANSWERS  Relationship to Patient? Third Party  Third Party Type? Durable Medical Equipment?    Representative Name? Saba Bautista

## 2022-08-17 DIAGNOSIS — G37.3 TRANSVERSE MYELITIS (HCC): ICD-10-CM

## 2022-08-17 NOTE — TELEPHONE ENCOUNTER
Please addened notes from 7/19/22 to say the same that it said from 4/19's HPI. I copied and pasted it here to make it easier. Pt here for face to face for lymphedema boots. Pt is wheelchair bound, not able to ambulate. Pt has been using stockings, with no improvement. Develops skin breakdown. Pt had lymphedema boots before at [facility] with good improvement. Has marked improvement in swelling, less skin infection. Patient has been trying to elevate her feet as best she can but being paraplegic, limits her ability. Patient has been trying low-salt diet for her lymphedema. After the paragraph is added to that note it needs faxed to [facility]. Fax number is below.

## 2022-08-17 NOTE — TELEPHONE ENCOUNTER
Mary Lou Almanza is calling stating he needs the office notes from 7/19/22 add. Stating the same thing the office notes on 4/19/22 . In order to get her pump approved .     [phone] is the fax Mary Lou Almanza

## 2022-08-18 RX ORDER — OXYCODONE HYDROCHLORIDE AND ACETAMINOPHEN 5; 325 MG/1; MG/1
1 TABLET ORAL NIGHTLY
Qty: 30 TABLET | Refills: 0 | Status: SHIPPED | OUTPATIENT
Start: 2022-08-18 | End: 2022-09-19 | Stop reason: SDUPTHER

## 2022-08-22 ENCOUNTER — TELEPHONE (OUTPATIENT)
Dept: PRIMARY CARE CLINIC | Age: 67
End: 2022-08-22

## 2022-08-25 DIAGNOSIS — M79.604 LEG PAIN, BILATERAL: ICD-10-CM

## 2022-08-25 DIAGNOSIS — M54.9 BACK PAIN, UNSPECIFIED BACK LOCATION, UNSPECIFIED BACK PAIN LATERALITY, UNSPECIFIED CHRONICITY: ICD-10-CM

## 2022-08-25 DIAGNOSIS — M79.605 LEG PAIN, BILATERAL: ICD-10-CM

## 2022-08-25 DIAGNOSIS — G82.21 PARAPLEGIA, COMPLETE (HCC): Primary | ICD-10-CM

## 2022-09-07 DIAGNOSIS — K21.9 GASTROESOPHAGEAL REFLUX DISEASE WITHOUT ESOPHAGITIS: ICD-10-CM

## 2022-09-07 RX ORDER — PYRIDOSTIGMINE BROMIDE 60 MG/1
TABLET ORAL
Qty: 28 TABLET | Refills: 0 | Status: SHIPPED | OUTPATIENT
Start: 2022-09-07 | End: 2022-10-04

## 2022-09-07 RX ORDER — OMEPRAZOLE 20 MG/1
CAPSULE, DELAYED RELEASE ORAL
Qty: 28 CAPSULE | Refills: 0 | Status: SHIPPED | OUTPATIENT
Start: 2022-09-07 | End: 2022-10-04

## 2022-09-07 RX ORDER — CYCLOBENZAPRINE HCL 10 MG
TABLET ORAL
Qty: 60 TABLET | Refills: 0 | Status: SHIPPED | OUTPATIENT
Start: 2022-09-07 | End: 2022-10-04

## 2022-09-12 ENCOUNTER — TELEPHONE (OUTPATIENT)
Dept: PRIMARY CARE CLINIC | Age: 67
End: 2022-09-12

## 2022-09-12 NOTE — TELEPHONE ENCOUNTER
Irma Grigsby from [facility] wound [facility]. Calling stating the insurance wont cover the lymphedema pump needs measurements and a recent office visit , the 4/19/22 office visit is to old wont accept it . What is needed in this office visit is this paragraph and measurements of the patients ankles, calf thighs, inseam length ( thigh near groin ). Pt here for face to face for lymphedema boots. Pt is wheelchair bound, not able to ambulate. Pt has been using stockings, with no improvement. Develops skin breakdown. Pt had lymphedema boots before at [facility] with good improvement. Has marked improvement in swelling, less skin infection. Patient has been trying to elevate her feet as best she can but being paraplegic, limits her ability. Patient has been trying low-salt diet for her lymphedema.       Please fax to [phone]

## 2022-09-19 DIAGNOSIS — G37.3 TRANSVERSE MYELITIS (HCC): ICD-10-CM

## 2022-09-19 RX ORDER — OXYCODONE HYDROCHLORIDE AND ACETAMINOPHEN 5; 325 MG/1; MG/1
1 TABLET ORAL NIGHTLY
Qty: 30 TABLET | Refills: 0 | Status: SHIPPED | OUTPATIENT
Start: 2022-09-19 | End: 2022-10-20 | Stop reason: SDUPTHER

## 2022-09-27 ENCOUNTER — IMMUNIZATION (OUTPATIENT)
Dept: PRIMARY CARE CLINIC | Age: 67
End: 2022-09-27

## 2022-09-27 ENCOUNTER — OFFICE VISIT (OUTPATIENT)
Dept: PRIMARY CARE CLINIC | Age: 67
End: 2022-09-27
Payer: COMMERCIAL

## 2022-09-27 VITALS
HEART RATE: 94 BPM | DIASTOLIC BLOOD PRESSURE: 86 MMHG | BODY MASS INDEX: 35.43 KG/M2 | HEIGHT: 63 IN | SYSTOLIC BLOOD PRESSURE: 130 MMHG | OXYGEN SATURATION: 97 %

## 2022-09-27 DIAGNOSIS — I89.0 LYMPHEDEMA OF BOTH LOWER EXTREMITIES: Primary | ICD-10-CM

## 2022-09-27 DIAGNOSIS — Z23 NEED FOR VACCINATION: ICD-10-CM

## 2022-09-27 DIAGNOSIS — G37.3 TRANSVERSE MYELITIS (HCC): ICD-10-CM

## 2022-09-27 DIAGNOSIS — Z23 NEED FOR VACCINATION: Primary | ICD-10-CM

## 2022-09-27 DIAGNOSIS — I48.20 CHRONIC ATRIAL FIBRILLATION (HCC): ICD-10-CM

## 2022-09-27 DIAGNOSIS — E11.9 CONTROLLED TYPE 2 DIABETES MELLITUS WITHOUT COMPLICATION, WITHOUT LONG-TERM CURRENT USE OF INSULIN (HCC): ICD-10-CM

## 2022-09-27 PROCEDURE — 99214 OFFICE O/P EST MOD 30 MIN: CPT | Performed by: FAMILY MEDICINE

## 2022-09-27 PROCEDURE — 90694 VACC AIIV4 NO PRSRV 0.5ML IM: CPT | Performed by: FAMILY MEDICINE

## 2022-09-27 PROCEDURE — 0124A PR ADM SARSCOV2 BIVAL 30MCG/0.3ML BST: CPT | Performed by: FAMILY MEDICINE

## 2022-09-27 PROCEDURE — 91312 COVID-19, PFIZER BIVALENT BOOSTER, (AGE 12Y+), IM, 30 MCG/0.3 ML: CPT | Performed by: FAMILY MEDICINE

## 2022-09-27 PROCEDURE — 1123F ACP DISCUSS/DSCN MKR DOCD: CPT | Performed by: FAMILY MEDICINE

## 2022-09-27 PROCEDURE — 3044F HG A1C LEVEL LT 7.0%: CPT | Performed by: FAMILY MEDICINE

## 2022-09-27 PROCEDURE — G0008 ADMIN INFLUENZA VIRUS VAC: HCPCS | Performed by: FAMILY MEDICINE

## 2022-09-27 RX ORDER — POTASSIUM CHLORIDE 750 MG/1
10 TABLET, EXTENDED RELEASE ORAL DAILY
COMMUNITY
Start: 2022-09-14

## 2022-09-27 ASSESSMENT — ENCOUNTER SYMPTOMS
NAUSEA: 0
RHINORRHEA: 0
SHORTNESS OF BREATH: 0
DIARRHEA: 0
EYE DISCHARGE: 0
ABDOMINAL PAIN: 0
COUGH: 0
EYE REDNESS: 0
WHEEZING: 0
VOMITING: 0
SORE THROAT: 0

## 2022-09-27 NOTE — TELEPHONE ENCOUNTER
Rep from [facility] wounds is stating per insurance they need 2 set of measurements at least a month apart around 10/25/22. Rep is stating for insurance to approve these 2 sets of measurements are needed .       [phone] Ex April

## 2022-09-27 NOTE — PROGRESS NOTES
After obtaining consent, and per orders of Dr. Dandre Zamarripa, injection of [address] given in Right deltoid by Ash Mejia. Patient instructed to remain in clinic for 15 minutes afterwards, and to report any adverse reaction to me immediately.

## 2022-09-27 NOTE — PROGRESS NOTES
[facility] PRIMARY CARE  [address] [facility] [address]  Dept: [phone]    Catie Webb is a 74 y.o. female Established patient, who presents today for her medical conditions/complaints as noted below. Chief Complaint   Patient presents with    Edema     lymphedema       HPI:     HPI  Pt here for face to face for lymphedema boots. Pt is wheelchair bound, not able to ambulate. Pt has been using stockings, with no improvement. Develops skin breakdown. Pt had lymphedema boots before at [facility] with good improvement. Has marked improvement in swelling, less skin infection. Patient has been trying to elevate her feet as best she can but being paraplegic, limits her ability. Patient has been trying low-salt diet for her lymphedema    Patient had good improvement in the skilled nursing facility with the lymphedema boots. States that he did \"go down like prunes\". Patient does have skin breakdown at the lymphedema is not kept under control.     Patient's blood sugars have been doing well with her diabetes    Patient continues to use pain medication for the lower leg pain caused by her lymphedema    Reviewed prior notes None  Reviewed previous Labs    LDL Cholesterol (mg/dL)   Date Value   10/19/2021 117     LDL Calculated (mg/dL)   Date Value   02/24/2020 117   12/31/2018 75   02/09/2018 135       (goal LDL is <100)   AST (U/L)   Date Value   10/19/2021 13     ALT (U/L)   Date Value   10/19/2021 13     BUN (mg/dL)   Date Value   10/19/2021 14     Hemoglobin A1C (%)   Date Value   07/19/2022 6.2     TSH (mIU/L)   Date Value   10/19/2021 4.07     BP Readings from Last 3 Encounters:   09/27/22 130/86   07/19/22 120/80   04/19/22 138/88          (goal 120/80)    Past Medical History:   Diagnosis Date    Acute transverse myelitis in demyelinating disease of central nervous system ([facility] [address]     Atrial fibrillation (HCC)     Depression     Hyperlipidemia     Hypertension Past Surgical History:   Procedure Laterality Date    APPENDECTOMY  1988    CHOLECYSTECTOMY, OPEN  48    FEMUR FRACTURE SURGERY Left 05/2018    HYSTERECTOMY, TOTAL ABDOMINAL (CERVIX REMOVED)  1988       No family history on file. Social History     Tobacco Use    Smoking status: Never    Smokeless tobacco: Never   Substance Use Topics    Alcohol use: No      Current Outpatient Medications   Medication Sig Dispense Refill    KLOR-CON M10 10 MEQ extended release tablet Take 10 mEq by mouth daily      oxyCODONE-acetaminophen (PERCOCET) 5-325 MG per tablet Take 1 tablet by mouth nightly for 30 days. 30 tablet 0    pyridostigmine (MESTINON) 60 MG tablet TAKE (1) TABLET EVERY MORNING 28 tablet 0    cyclobenzaprine (FLEXERIL) 10 MG tablet TAKE 1 TABLET 2 TIMES A DAY AS DIRECTED 60 tablet 0    omeprazole (PRILOSEC) 20 MG delayed release capsule TAKE (1) CAPSULE EVERY MORNING BEFORE BREAKFAST 28 capsule 0    metFORMIN (GLUCOPHAGE) 500 MG tablet Take 1 tablet by mouth daily (with breakfast) 90 tablet 1    rivaroxaban (XARELTO) 20 MG TABS tablet Take 1 tablet by mouth daily (with breakfast) 90 tablet 3    DULoxetine (CYMBALTA) 60 MG extended release capsule Take 1 capsule by mouth in the morning. 90 capsule 1    losartan (COZAAR) 25 MG tablet Take 1 tablet by mouth in the morning. 90 tablet 1    metoprolol tartrate (LOPRESSOR) 25 MG tablet Take 1 tablet by mouth in the morning and 1 tablet before bedtime. 180 tablet 1    atorvastatin (LIPITOR) 20 MG tablet Take 1 tablet by mouth every evening 90 tablet 1    furosemide (LASIX) 40 MG tablet Take 1 tablet by mouth in the morning. 90 tablet 1    potassium chloride (KLOR-CON) 10 MEQ extended release tablet Take 1 tablet by mouth in the morning and 1 tablet before bedtime. TAKE (1) TABLET EVERY MORNING.  180 tablet 3    oxybutynin (DITROPAN-XL) 10 MG extended release tablet       promethazine (PHENERGAN) 25 MG tablet TAKE (1) TABLET FOUR TIMES A DAY AS NEEDED FOR NAUSEA 20 tablet 0    RA HEMORRHOIDAL 1-0.25-14.4-15 % CREA rectal cream apply TO RECTUM AREA once daily if needed for HEMMORROIDS  0    TRUEPLUS LANCETS 30G MISC use as directed TESTING ONCE A WEEK  0    TRUE METRIX BLOOD GLUCOSE TEST strip TEST ONCE A WEEK  0    RA ALLERGY 25 MG tablet take 1 tablet by mouth three times a day if needed for itching  0    Blood Glucose Monitoring Suppl (TRUE METRIX METER) w/Device KIT use as directed ONCE A WEEK  0    RA ACETAMINOPHEN 325 MG tablet take 1 tablet by mouth every 4 hours if needed for **MILD** PAIN/...  (REFER TO PRESCRIPTION NOTES). 0     No current facility-administered medications for this visit. Allergies   Allergen Reactions    Compazine [Prochlorperazine]      Cause neurological sx    Sulfa Antibiotics      Childhood allergy       Health Maintenance   Topic Date Due    HIV screen  Never done    Diabetic microalbuminuria test  Never done    Diabetic retinal exam  Never done    Colorectal Cancer Screen  Never done    DEXA (modify frequency per FRAX score)  Never done    Diabetic foot exam  08/04/2021    COVID-19 Vaccine (4 - Booster for Pfizer series) 03/24/2022    Annual Wellness Visit (AWV)  05/27/2022    Flu vaccine (1) 08/01/2022    Lipids  10/19/2022    A1C test (Diabetic or Prediabetic)  07/19/2023    Depression Screen  07/19/2023    Breast cancer screen  10/19/2023    DTaP/Tdap/Td vaccine (3 - Td or Tdap) 09/20/2028    Shingles vaccine  Completed    Pneumococcal 65+ years Vaccine  Completed    Hepatitis C screen  Completed    Hepatitis A vaccine  Aged Out    Hib vaccine  Aged Out    Meningococcal (ACWY) vaccine  Aged Out       Subjective:      Review of Systems   Constitutional:  Negative for chills and fever. HENT:  Negative for rhinorrhea and sore throat. Eyes:  Negative for discharge and redness. Respiratory:  Negative for cough, shortness of breath and wheezing. Cardiovascular:  Positive for leg swelling. Negative for chest pain and palpitations. Gastrointestinal:  Negative for abdominal pain, diarrhea, nausea and vomiting. Genitourinary:  Negative for dysuria and frequency. Musculoskeletal:  Negative for arthralgias and myalgias. Neurological:  Negative for dizziness, light-headedness and headaches. Psychiatric/Behavioral:  Negative for sleep disturbance. Objective:     /86   Pulse 94   Ht 5' 3\" (1.6 m)   SpO2 97%   BMI 35.43 kg/m²   Physical Exam  Vitals and nursing note reviewed. Constitutional:       General: She is not in acute distress. Appearance: She is well-developed. She is not ill-appearing. HENT:      Head: Normocephalic and atraumatic. Right Ear: External ear normal.      Left Ear: External ear normal.   Eyes:      General: No scleral icterus. Right eye: No discharge. Left eye: No discharge. Conjunctiva/sclera: Conjunctivae normal.   Neck:      Thyroid: No thyromegaly. Trachea: No tracheal deviation. Cardiovascular:      Rate and Rhythm: Normal rate and regular rhythm. Heart sounds: Normal heart sounds. Pulmonary:      Effort: Pulmonary effort is normal. No respiratory distress. Breath sounds: Normal breath sounds. No wheezing. Musculoskeletal:      Right lower leg: Edema present. Left lower leg: Edema present. Comments: Right ankle 11.5 inches  Left ankle 10.5 inches  Right calf 22 inches  Left calf 18 inches  Right thigh 25 inches  Left thigh 24.5 inches   Lymphadenopathy:      Cervical: No cervical adenopathy. Skin:     General: Skin is warm. Findings: No rash. Neurological:      Mental Status: She is alert and oriented to person, place, and time. Psychiatric:         Mood and Affect: Mood normal.         Behavior: Behavior normal.         Thought Content: Thought content normal.       Assessment:       Diagnosis Orders   1. Lymphedema of both lower extremities        2.  Need for vaccination  Influenza, FLUAD, (age 72 y+), IM, Preservative Free, 0.5 mL      3. Controlled type 2 diabetes mellitus without complication, without long-term current use of insulin ([facility] [address]        4. Chronic atrial fibrillation (HCC)        5. Transverse myelitis ([facility] [address]               Plan:   Paperwork sent in for lymphedema boots. Would definitely be very beneficial for patient  Continue Percocet as is  Continue diabetic medications  Flu shot today  COVID booster today    Return if symptoms worsen or fail to improve. Orders Placed This Encounter   Procedures    Influenza, FLUAD, (age 72 y+), IM, Preservative Free, 0.5 mL     No orders of the defined types were placed in this encounter. Patient given educational materials - see patient instructions. Discussed use, benefit, and side effects of prescribed medications. All patient questions answered. Pt voiced understanding. Reviewed health maintenance. Instructed to continue current medications, diet andexercise. Patient agreed with treatment plan. Follow up as directed.      Electronicallysigned by Trixie Gage MD on 9/27/2022 at 10:59 AM

## 2022-10-04 DIAGNOSIS — K21.9 GASTROESOPHAGEAL REFLUX DISEASE WITHOUT ESOPHAGITIS: ICD-10-CM

## 2022-10-04 RX ORDER — PYRIDOSTIGMINE BROMIDE 60 MG/1
TABLET ORAL
Qty: 28 TABLET | Refills: 0 | Status: SHIPPED | OUTPATIENT
Start: 2022-10-04 | End: 2022-11-02

## 2022-10-04 RX ORDER — OMEPRAZOLE 20 MG/1
CAPSULE, DELAYED RELEASE ORAL
Qty: 28 CAPSULE | Refills: 0 | Status: SHIPPED | OUTPATIENT
Start: 2022-10-04 | End: 2022-11-02

## 2022-10-04 RX ORDER — CYCLOBENZAPRINE HCL 10 MG
TABLET ORAL
Qty: 60 TABLET | Refills: 0 | Status: SHIPPED | OUTPATIENT
Start: 2022-10-04 | End: 2022-11-02

## 2022-10-20 DIAGNOSIS — G37.3 TRANSVERSE MYELITIS (HCC): ICD-10-CM

## 2022-10-20 RX ORDER — OXYCODONE HYDROCHLORIDE AND ACETAMINOPHEN 5; 325 MG/1; MG/1
1 TABLET ORAL NIGHTLY
Qty: 30 TABLET | Refills: 0 | Status: SHIPPED | OUTPATIENT
Start: 2022-10-20 | End: 2022-11-19

## 2022-10-25 ENCOUNTER — NURSE ONLY (OUTPATIENT)
Dept: PRIMARY CARE CLINIC | Age: 67
End: 2022-10-25

## 2022-10-25 NOTE — PROGRESS NOTES
Right ankle 13 1/2 inches  Right Calf 22 1/4 inches  Right thigh 25 inches     Left Ankle 11 inches   Left Calf 18 1/4 inches  Left thigh 24 1/4 inches

## 2022-10-27 ENCOUNTER — TELEPHONE (OUTPATIENT)
Dept: PRIMARY CARE CLINIC | Age: 67
End: 2022-10-27

## 2022-10-27 NOTE — TELEPHONE ENCOUNTER
Spoke with [facility] and they no longer do the Diabetic Shoes ,    Patient is informed of this and will call us back where she would like the order for the shoes to go to .

## 2022-10-27 NOTE — TELEPHONE ENCOUNTER
Patient asking for new orders for AFO's & diabetic shoes    [address] [address] fax to [phone]

## 2022-11-02 DIAGNOSIS — K21.9 GASTROESOPHAGEAL REFLUX DISEASE WITHOUT ESOPHAGITIS: ICD-10-CM

## 2022-11-02 RX ORDER — PYRIDOSTIGMINE BROMIDE 60 MG/1
TABLET ORAL
Qty: 28 TABLET | Refills: 0 | Status: SHIPPED | OUTPATIENT
Start: 2022-11-02

## 2022-11-02 RX ORDER — CYCLOBENZAPRINE HCL 10 MG
TABLET ORAL
Qty: 60 TABLET | Refills: 0 | Status: SHIPPED | OUTPATIENT
Start: 2022-11-02

## 2022-11-02 RX ORDER — OMEPRAZOLE 20 MG/1
CAPSULE, DELAYED RELEASE ORAL
Qty: 28 CAPSULE | Refills: 0 | Status: SHIPPED | OUTPATIENT
Start: 2022-11-02

## 2022-11-08 RX ORDER — POTASSIUM CHLORIDE 750 MG/1
TABLET, EXTENDED RELEASE ORAL
Qty: 56 TABLET | Refills: 0 | Status: SHIPPED | OUTPATIENT
Start: 2022-11-08

## 2022-11-21 ENCOUNTER — OFFICE VISIT (OUTPATIENT)
Dept: PRIMARY CARE CLINIC | Age: 67
End: 2022-11-21
Payer: COMMERCIAL

## 2022-11-21 VITALS — HEART RATE: 70 BPM | SYSTOLIC BLOOD PRESSURE: 138 MMHG | DIASTOLIC BLOOD PRESSURE: 84 MMHG | OXYGEN SATURATION: 97 %

## 2022-11-21 DIAGNOSIS — Z13.6 ENCOUNTER FOR LIPID SCREENING FOR CARDIOVASCULAR DISEASE: ICD-10-CM

## 2022-11-21 DIAGNOSIS — E11.9 CONTROLLED TYPE 2 DIABETES MELLITUS WITHOUT COMPLICATION, WITHOUT LONG-TERM CURRENT USE OF INSULIN (HCC): ICD-10-CM

## 2022-11-21 DIAGNOSIS — Z13.220 ENCOUNTER FOR LIPID SCREENING FOR CARDIOVASCULAR DISEASE: ICD-10-CM

## 2022-11-21 DIAGNOSIS — Z00.00 MEDICARE ANNUAL WELLNESS VISIT, SUBSEQUENT: Primary | ICD-10-CM

## 2022-11-21 DIAGNOSIS — I10 ESSENTIAL HYPERTENSION: ICD-10-CM

## 2022-11-21 DIAGNOSIS — G37.3 TRANSVERSE MYELITIS (HCC): ICD-10-CM

## 2022-11-21 DIAGNOSIS — Z00.00 ANNUAL PHYSICAL EXAM: ICD-10-CM

## 2022-11-21 DIAGNOSIS — G82.21 PARAPLEGIA, COMPLETE (HCC): ICD-10-CM

## 2022-11-21 DIAGNOSIS — I89.0 LYMPHEDEMA, NOT ELSEWHERE CLASSIFIED: ICD-10-CM

## 2022-11-21 LAB — HBA1C MFR BLD: 5.6 %

## 2022-11-21 PROCEDURE — 3078F DIAST BP <80 MM HG: CPT | Performed by: FAMILY MEDICINE

## 2022-11-21 PROCEDURE — 1123F ACP DISCUSS/DSCN MKR DOCD: CPT | Performed by: FAMILY MEDICINE

## 2022-11-21 PROCEDURE — G0439 PPPS, SUBSEQ VISIT: HCPCS | Performed by: FAMILY MEDICINE

## 2022-11-21 PROCEDURE — 83036 HEMOGLOBIN GLYCOSYLATED A1C: CPT | Performed by: FAMILY MEDICINE

## 2022-11-21 PROCEDURE — 3044F HG A1C LEVEL LT 7.0%: CPT | Performed by: FAMILY MEDICINE

## 2022-11-21 PROCEDURE — 3074F SYST BP LT 130 MM HG: CPT | Performed by: FAMILY MEDICINE

## 2022-11-21 RX ORDER — OXYBUTYNIN CHLORIDE 10 MG/1
10 TABLET, EXTENDED RELEASE ORAL DAILY
Qty: 30 TABLET | Refills: 3 | Status: CANCELLED | OUTPATIENT
Start: 2022-11-21

## 2022-11-21 RX ORDER — OXYCODONE HYDROCHLORIDE AND ACETAMINOPHEN 5; 325 MG/1; MG/1
1 TABLET ORAL NIGHTLY
Qty: 30 TABLET | Refills: 0 | Status: SHIPPED | OUTPATIENT
Start: 2022-11-21 | End: 2022-12-21

## 2022-11-21 ASSESSMENT — LIFESTYLE VARIABLES
HOW MANY STANDARD DRINKS CONTAINING ALCOHOL DO YOU HAVE ON A TYPICAL DAY: PATIENT DOES NOT DRINK
HOW OFTEN DO YOU HAVE A DRINK CONTAINING ALCOHOL: NEVER

## 2022-11-21 ASSESSMENT — PATIENT HEALTH QUESTIONNAIRE - PHQ9
2. FEELING DOWN, DEPRESSED OR HOPELESS: 0
SUM OF ALL RESPONSES TO PHQ QUESTIONS 1-9: 0
SUM OF ALL RESPONSES TO PHQ9 QUESTIONS 1 & 2: 0
SUM OF ALL RESPONSES TO PHQ QUESTIONS 1-9: 0
1. LITTLE INTEREST OR PLEASURE IN DOING THINGS: 0

## 2022-11-21 NOTE — PROGRESS NOTES
Medicare Annual Wellness Visit    Masoud Cui is here for Medicare AWV and Diabetes    Assessment & Plan   Medicare annual wellness visit, subsequent  Transverse myelitis ([facility] [address]  -     oxyCODONE-acetaminophen (PERCOCET) 5-325 MG per tablet; Take 1 tablet by mouth nightly for 30 days. , Disp-30 tablet, R-0Normal  Essential hypertension  Lymphedema, not elsewhere classified  Controlled type 2 diabetes mellitus without complication, without long-term current use of insulin (HCC)  -     POCT glycosylated hemoglobin (Hb A1C)  -     Microalbumin, Ur; Future  Encounter for lipid screening for cardiovascular disease  -     Lipid, Fasting; Future  Annual physical exam  -     Basic Metabolic Panel, Fasting; Future  -     Hepatic Function Panel; Future  Paraplegia, complete [facility])    Recommendations for Preventive Services Due: see orders and patient instructions/AVS.  Recommended screening schedule for the next 5-10 years is provided to the patient in written form: see Patient Instructions/AVS.     Return in about 4 months (around 3/21/2023) for DIABETES. Subjective   The following acute and/or chronic problems were also addressed today:  NIDDM  Lymphedema    Patient's complete Health Risk Assessment and screening values have been reviewed and are found in Flowsheets. The following problems were reviewed today and where indicated follow up appointments were made and/or referrals ordered. Positive Risk Factor Screenings with Interventions:             Opioid Risk: (Low risk score <55) Opioid risk score: 3    Patient is low risk for opioid use disorder or overdose.   Last PDMP Alessandra Parents as Reviewed:  Review User Review Instant Review Result   Joce Jones 11/21/2022 11:36 AM     Reviewed PDMP [1]     Last Controlled Substance Monitoring Documentation      [address] Office Visit from 5/26/2020 in [facility]   Periodic Controlled Substance Monitoring Possible medication side effects, risk of tolerance/dependence & alternative treatments discussed., No signs of potential drug abuse or diversion identified. filed at 05/26/2020 1042   Chronic Pain > 50 MEDD Obtained or confirmed \"Consent for Opioid Use\" on file. filed at 05/26/2020 1042   Chronic Pain > 120 MEDD Obtained or confirmed \"Medication Contract\" on file. filed at 05/26/2020 [facility] and ACP:  General  In general, how would you say your health is?: Good  In the past 7 days, have you experienced any of the following: New or Increased Pain, New or Increased Fatigue, Loneliness, Social Isolation, Stress or Anger?: No  Do you get the social and emotional support that you need?: Yes  Do you have a Living Will?: (!) No    Advance Directives       Power of [address] [place] [address] Will ACP-Advance Directive ACP-Power of     Not on File Not on File Not on File Not on File        General Health Risk Interventions:  No Living Will: Advance Care Planning addressed with patient today    Health Habits/Nutrition:  Physical Activity: Inactive    Days of Exercise per Week: 0 days    Minutes of Exercise per Session: 0 min     Have you lost any weight without trying in the past 3 months?: No     Have you seen the dentist within the past year?: Yes  Health Habits/Nutrition Interventions:  Dental exam overdue:  patient encouraged to make appointment with his/her dentist    Hearing/Vision:  Do you or your family notice any trouble with your hearing that hasn't been managed with hearing aids?: No  Do you have difficulty driving, watching TV, or doing any of your daily activities because of your eyesight?: No  Have you had an eye exam within the past year?: (!) No  No results found.   Hearing/Vision Interventions:  No concerns      ADLs:  In the past 7 days, did you need help from others to perform any of the following everyday activities: Eating, dressing, grooming, bathing, toileting, or walking/balance?: (!) Yes  Select all that apply: (!) Grooming, Dressing, Bathing, Toileting  In the past 7 days, did you need help from others to take care of any of the following: Laundry, housekeeping, banking/finances, shopping, telephone use, food preparation, transportation, or taking medications?: (!) Yes  Select all that apply: (!) Transportation, Food Preparation, Shopping, Housekeeping, Laundry  ADL Interventions:  Patient declines any further evaluation/treatment for this issue          Objective   Vitals:    11/21/22 1119   BP: 138/84   Pulse: 70   SpO2: 97%      There is no height or weight on file to calculate BMI. General Appearance: alert and oriented to person, place and time, well developed and well- nourished, in no acute distress  Skin: warm and dry, no rash or erythema  Head: normocephalic and atraumatic  Eyes: pupils equal, round, and reactive to light, extraocular eye movements intact, conjunctivae normal  ENT: tympanic membrane, external ear and ear canal normal bilaterally, nose without deformity, nasal mucosa and turbinates normal without polyps  Neck: supple and non-tender without mass, no thyromegaly or thyroid nodules, no cervical lymphadenopathy  Pulmonary/Chest: clear to auscultation bilaterally- no wheezes, rales or rhonchi, normal air movement, no respiratory distress  Cardiovascular: normal rate, regular rhythm, normal S1 and S2, no murmurs, rubs, clicks, or gallops, distal pulses intact, no carotid bruits  Abdomen: soft, non-tender, non-distended, normal bowel sounds, no masses or organomegaly  Extremities: no cyanosis, clubbing .  3+ edema bilateral  Musculoskeletal: normal range of motion, no joint swelling, deformity or tenderness  Neurologic: reflexes normal and symmetric, no cranial nerve deficit, gait, coordination and speech normal       Allergies   Allergen Reactions    Compazine [Prochlorperazine]      Cause neurological sx    Sulfa Antibiotics      Childhood allergy     Prior to Visit Medications    Medication Sig Taking? Authorizing Provider   oxyCODONE-acetaminophen (PERCOCET) 5-325 MG per tablet Take 1 tablet by mouth nightly for 30 days. Yes Chaya Coulter MD   KLOR-CON M10 10 MEQ extended release tablet TAKE 1 TABLET IN THE MORNING AND 1 TABLET IN THE EVENING Yes Chaya Coulter MD   pyridostigmine (MESTINON) 60 MG tablet TAKE (1) Inge Rufino Yes Chaya Coulter MD   omeprazole (PRILOSEC) 20 MG delayed release capsule TAKE (1) Dewane Mealy Yes Chaya Coulter MD   cyclobenzaprine (FLEXERIL) 10 MG tablet TAKE 1 TABLET 2 TIMES A DAY AS DIRECTED Yes Chaya Coulter MD   metFORMIN (GLUCOPHAGE) 500 MG tablet Take 1 tablet by mouth daily (with breakfast) Yes Chaya Coulter MD   rivaroxaban (XARELTO) 20 MG TABS tablet Take 1 tablet by mouth daily (with breakfast) Yes Chaya Coulter MD   DULoxetine (CYMBALTA) 60 MG extended release capsule Take 1 capsule by mouth in the morning. Yes Chaya Coulter MD   losartan (COZAAR) 25 MG tablet Take 1 tablet by mouth in the morning. Yes Chaya Coulter MD   metoprolol tartrate (LOPRESSOR) 25 MG tablet Take 1 tablet by mouth in the morning and 1 tablet before bedtime. Yes Chaya Coulter MD   atorvastatin (LIPITOR) 20 MG tablet Take 1 tablet by mouth every evening Yes Chaya Coulter MD   furosemide (LASIX) 40 MG tablet Take 1 tablet by mouth in the morning. Yes Chaya Coulter MD   potassium chloride (KLOR-CON) 10 MEQ extended release tablet Take 1 tablet by mouth in the morning and 1 tablet before bedtime. TAKE (1) TABLET EVERY MORNING.  Yes Chaya Coulter MD   oxybutynin (DITROPAN-XL) 10 MG extended release tablet  Yes Historical Provider, MD   promethazine (PHENERGAN) 25 MG tablet TAKE (1) TABLET FOUR TIMES A DAY AS NEEDED FOR NAUSEA Yes Jovanna Nose, APRN - CNP   RA HEMORRHOIDAL 1-0.25-14.4-15 % CREA rectal cream apply TO RECTUM AREA once daily if needed for HEMMORROIDS Yes Historical Provider, MD   TRUEPLUS LANCETS 30G MISC use as directed TESTING ONCE A WEEK Yes Historical Provider, MD   TRUE METRIX BLOOD GLUCOSE TEST strip TEST ONCE A WEEK Yes Historical Provider, MD MO ALLERGY 25 MG tablet take 1 tablet by mouth three times a day if needed for itching Yes Historical Provider, MD   Blood Glucose Monitoring Suppl (TRUE METRIX METER) w/Device KIT use as directed ONCE A WEEK Yes Historical Provider, MD MO ACETAMINOPHEN 325 MG tablet take 1 tablet by mouth every 4 hours if needed for **MILD** PAIN/...  (REFER TO PRESCRIPTION NOTES). Yes Historical Provider, MD Mazariegos (Including outside providers/suppliers regularly involved in providing care):   Patient Care Team:  Remedios Garza MD as PCP - General (Family Medicine)  Remedios Garza MD as PCP - [facility]     Reviewed and updated this visit:  Allergies  Meds  Problems  Med Hx  Surg Hx  Soc Hx  Fam Hx        Blood work ordered  Measurements for lymphedema  Renew meds    Right ankle 13 inch   left ankle 12 inches  Right calf   24.5 inches   left calf 20 inches  Right thigh  25.5 inches   left thigh 24.5 inches    Pt here for face to face for lymphedema boots. Pt is wheelchair bound, not able to ambulate. Pt has been using stockings, with no improvement. Develops skin breakdown. Pt had lymphedema boots before at SNF with good improvement. Has marked improvement in swelling, less skin infection. Patient has been trying to elevate her feet as best she can but being paraplegic, limits her ability. Patient has been trying low-salt diet for her lymphedema     Patient had good improvement in the skilled nursing facility with the lymphedema boots. States that he did \"go down like prunes\". Patient does have skin breakdown at the lymphedema is not kept under control.

## 2022-11-21 NOTE — PATIENT INSTRUCTIONS
Personalized Preventive Plan for Uriel Blackwell - 11/21/2022  Medicare offers a range of preventive health benefits. Some of the tests and screenings are paid in full while other may be subject to a deductible, co-insurance, and/or copay. Some of these benefits include a comprehensive review of your medical history including lifestyle, illnesses that may run in your family, and various assessments and screenings as appropriate. After reviewing your medical record and screening and assessments performed today your provider may have ordered immunizations, labs, imaging, and/or referrals for you. A list of these orders (if applicable) as well as your Preventive Care list are included within your After Visit Summary for your review. Other Preventive Recommendations:    A preventive eye exam performed by an eye specialist is recommended every 1-2 years to screen for glaucoma; cataracts, macular degeneration, and other eye disorders. A preventive dental visit is recommended every 6 months. Try to get at least 150 minutes of exercise per week or 10,000 steps per day on a pedometer . Order or download the FREE \"Exercise & Physical Activity: Your Everyday Guide\" from [employer]. Call 0-119.785.8669 or search [employer] online. You need 8859-9838 mg of calcium and 1083-2617 IU of vitamin D per day. It is possible to meet your calcium requirement with diet alone, but a vitamin D supplement is usually necessary to meet this goal.  When exposed to the sun, use a sunscreen that protects against both UVA and UVB radiation with an SPF of 30 or greater. Reapply every 2 to 3 hours or after sweating, drying off with a towel, or swimming. Always wear a seat belt when traveling in a car. Always wear a helmet when riding a bicycle or motorcycle.

## 2022-11-22 ENCOUNTER — TELEPHONE (OUTPATIENT)
Dept: PRIMARY CARE CLINIC | Age: 67
End: 2022-11-22

## 2022-11-22 NOTE — TELEPHONE ENCOUNTER
Bonnie Davies is calling stating that in the office notes of 9/27/22 the first two paragraphs under the HPI need to be in the office notes of 11/21/22 and faxed back over.

## 2022-11-30 ENCOUNTER — OFFICE VISIT (OUTPATIENT)
Dept: PODIATRY | Age: 67
End: 2022-11-30

## 2022-11-30 VITALS — HEIGHT: 63 IN | WEIGHT: 225 LBS | BODY MASS INDEX: 39.87 KG/M2

## 2022-11-30 DIAGNOSIS — B35.1 ONYCHOMYCOSIS: Primary | ICD-10-CM

## 2022-11-30 DIAGNOSIS — E11.9 CONTROLLED TYPE 2 DIABETES MELLITUS WITHOUT COMPLICATION, WITHOUT LONG-TERM CURRENT USE OF INSULIN (HCC): ICD-10-CM

## 2022-11-30 DIAGNOSIS — G37.3 TRANSVERSE MYELITIS (HCC): ICD-10-CM

## 2022-11-30 DIAGNOSIS — M79.675 PAIN IN TOES OF BOTH FEET: ICD-10-CM

## 2022-11-30 DIAGNOSIS — G82.21 PARAPLEGIA, COMPLETE (HCC): ICD-10-CM

## 2022-11-30 DIAGNOSIS — M79.674 PAIN IN TOES OF BOTH FEET: ICD-10-CM

## 2022-11-30 ASSESSMENT — ENCOUNTER SYMPTOMS
CONSTIPATION: 0
COLOR CHANGE: 0
VOMITING: 0
NAUSEA: 0
DIARRHEA: 0

## 2022-11-30 NOTE — PROGRESS NOTES
[facility]  Return Patient History and Physical    Chief Complaint:   Chief Complaint   Patient presents with    Nail Problem     B.l nail trim/ last seen Dr. Humphrey Sicard 11/21/2022       HPI: Carlos Del Rosario is a 74 y.o. female who presents to the office today complaining of nail problem. She cannot cut her nails herself, nor does she have anyone to do is safely for her. She is wheelchair bound. She lives with her daughter. Currently denies F/C/N/V. I have not seen her in over a year. Allergies   Allergen Reactions    Compazine [Prochlorperazine]      Cause neurological sx    Sulfa Antibiotics      Childhood allergy       Past Medical History:   Diagnosis Date    Acute transverse myelitis in demyelinating disease of central nervous system ([facility] [address]     Atrial fibrillation ([facility] [address]     Depression     Hyperlipidemia     Hypertension        Prior to Admission medications    Medication Sig Start Date End Date Taking? Authorizing Provider   oxyCODONE-acetaminophen (PERCOCET) 5-325 MG per tablet Take 1 tablet by mouth nightly for 30 days. 11/21/22 12/21/22 Yes Eleazar Reeves MD   KLOR-CON M10 10 MEQ extended release tablet TAKE 1 TABLET IN THE MORNING AND 1 TABLET IN THE EVENING 11/8/22  Yes Eleazar Reeves MD   pyridostigmine (MESTINON) 60 MG tablet TAKE (1) TABLET EVERY MORNING 11/2/22  Yes Eleazar Reeves MD   omeprazole (PRILOSEC) 20 MG delayed release capsule TAKE (1) CAPSULE EVERY MORNING BEFORE BREAKFAST 11/2/22  Yes Eleazar Reeves MD   cyclobenzaprine (FLEXERIL) 10 MG tablet TAKE 1 TABLET 2 TIMES A DAY AS DIRECTED 11/2/22  Yes Eleazar Reeves MD   metFORMIN (GLUCOPHAGE) 500 MG tablet Take 1 tablet by mouth daily (with breakfast) 7/19/22  Yes Eleazar Reeves MD   rivaroxaban Cee Valle) 20 MG TABS tablet Take 1 tablet by mouth daily (with breakfast) 7/19/22  Yes Eleazar Reeves MD   DULoxetine (CYMBALTA) 60 MG extended release capsule Take 1 capsule by mouth in the morning. 7/19/22  Yes Lino Richey MD   losartan (COZAAR) 25 MG tablet Take 1 tablet by mouth in the morning. 7/19/22  Yes Lino Richey MD   metoprolol tartrate (LOPRESSOR) 25 MG tablet Take 1 tablet by mouth in the morning and 1 tablet before bedtime. 7/19/22  Yes Lino Richey MD   atorvastatin (LIPITOR) 20 MG tablet Take 1 tablet by mouth every evening 7/19/22  Yes Lino Richey MD   furosemide (LASIX) 40 MG tablet Take 1 tablet by mouth in the morning. 7/19/22  Yes Lino Richey MD   potassium chloride (KLOR-CON) 10 MEQ extended release tablet Take 1 tablet by mouth in the morning and 1 tablet before bedtime. TAKE (1) TABLET EVERY MORNING. 7/19/22  Yes Lino Richey MD   oxybutynin (DITROPAN-XL) 10 MG extended release tablet  2/4/21  Yes Historical Provider, MD   promethazine (PHENERGAN) 25 MG tablet TAKE (1) TABLET FOUR TIMES A DAY AS NEEDED FOR NAUSEA 4/2/20  Yes Uday Maciel, APRN - CNP   RA HEMORRHOIDAL 1-0.25-14.4-15 % CREA rectal cream apply TO RECTUM AREA once daily if needed for HEMMORROIDS 12/4/17  Yes Historical Provider, MD   TRUEPLUS LANCETS 30G MISC use as directed TESTING ONCE A WEEK 12/5/17  Yes Historical Provider, MD   TRUE METRIX BLOOD GLUCOSE TEST strip TEST ONCE A WEEK 12/5/17  Yes Historical Provider, MD MO ALLERGY 25 MG tablet take 1 tablet by mouth three times a day if needed for itching 12/4/17  Yes Historical Provider, MD   Blood Glucose Monitoring Suppl (TRUE METRIX METER) w/Device KIT use as directed ONCE A WEEK 12/5/17  Yes Historical Provider, MD MO ACETAMINOPHEN 325 MG tablet take 1 tablet by mouth every 4 hours if needed for **MILD** PAIN/...  (REFER TO PRESCRIPTION NOTES).  12/4/17  Yes Historical Provider, MD       Past Surgical History:   Procedure Laterality Date    APPENDECTOMY  1988    CHOLECYSTECTOMY, OPEN  1984    FEMUR FRACTURE SURGERY Left 05/2018    HYSTERECTOMY, TOTAL ABDOMINAL (CERVIX REMOVED)  1988       No family history on file.    Social History     Tobacco Use    Smoking status: Never    Smokeless tobacco: Never   Substance Use Topics    Alcohol use: No       Review of Systems   Constitutional:  Negative for chills, diaphoresis, fatigue, fever and unexpected weight change. Cardiovascular:  Negative for leg swelling. Gastrointestinal:  Negative for constipation, diarrhea, nausea and vomiting. Musculoskeletal:  Negative for arthralgias, gait problem and joint swelling. Skin:  Positive for wound. Negative for color change, pallor and rash. Neurological:  Positive for weakness and numbness. Lower Extremity Physical Examination:     Vitals: There were no vitals filed for this visit. General: AAO x 3 in NAD. Vascular: DP and PT pulses palpable 2/4, Bilateral.  CFT <3 seconds, Bilateral.  Hair growth absent to the level of the digits, Bilateral.  Edema present, Bilateral.  Varicosities absent, Bilateral. Erythema absent, Bilateral    Neurological:  Sensation absent to light touch to level of digits, Bilateral.    Musculoskeletal: ankle contracture bilateral, foot inverted.      Integument: Warm, dry, supple, Bilateral.      Sites of Onychomycosis Involvement (Check affected area)  [x] [x] [x] [x] [x] [x] [x] [x] [x] [x]  5 4 3 2 1 1 2 3 4 5                          Right                                        Left    Thickness  [x] [x] [x] [x] [x] [x] [x] [x] [x] [x]  5 4 3 2 1 1 2 3 4 5                         Right                                        Left    Dystrophic Changes                                                                 [x] [x] [x] [x] [x] [x] [x] [x] [x] [x]  5 4 3 2 1 1 2 3 4 5                         Right                                        Left    Color                                                                  [x] [x] [x] [x] [x] [x] [x] [x] [x] [x]  5 4 3 2 1 1 2 3 4 5                          Right                                        Left    Incurvation/Ingrowin

## 2022-12-02 DIAGNOSIS — K21.9 GASTROESOPHAGEAL REFLUX DISEASE WITHOUT ESOPHAGITIS: ICD-10-CM

## 2022-12-02 RX ORDER — PYRIDOSTIGMINE BROMIDE 60 MG/1
TABLET ORAL
Qty: 28 TABLET | Refills: 0 | Status: SHIPPED | OUTPATIENT
Start: 2022-12-02

## 2022-12-02 RX ORDER — CYCLOBENZAPRINE HCL 10 MG
TABLET ORAL
Qty: 60 TABLET | Refills: 0 | Status: SHIPPED | OUTPATIENT
Start: 2022-12-02

## 2022-12-02 RX ORDER — OMEPRAZOLE 20 MG/1
CAPSULE, DELAYED RELEASE ORAL
Qty: 28 CAPSULE | Refills: 0 | Status: SHIPPED | OUTPATIENT
Start: 2022-12-02

## 2022-12-02 RX ORDER — POTASSIUM CHLORIDE 750 MG/1
TABLET, EXTENDED RELEASE ORAL
Qty: 56 TABLET | Refills: 0 | Status: SHIPPED | OUTPATIENT
Start: 2022-12-02

## 2022-12-22 DIAGNOSIS — G37.3 TRANSVERSE MYELITIS (HCC): ICD-10-CM

## 2022-12-22 RX ORDER — OXYCODONE HYDROCHLORIDE AND ACETAMINOPHEN 5; 325 MG/1; MG/1
1 TABLET ORAL NIGHTLY
Qty: 30 TABLET | Refills: 0 | Status: SHIPPED | OUTPATIENT
Start: 2022-12-22 | End: 2023-01-21

## 2022-12-22 NOTE — TELEPHONE ENCOUNTER
LAST VISIT:   11/21/2022     Future Appointments   Date Time Provider Jarrell Lou   [facility]   3/21/2023 11:20 AM MD HAKEEM Dewey

## 2022-12-29 DIAGNOSIS — E11.9 CONTROLLED TYPE 2 DIABETES MELLITUS WITHOUT COMPLICATION, WITHOUT LONG-TERM CURRENT USE OF INSULIN (HCC): ICD-10-CM

## 2022-12-29 DIAGNOSIS — I10 ESSENTIAL HYPERTENSION: ICD-10-CM

## 2022-12-29 DIAGNOSIS — K21.9 GASTROESOPHAGEAL REFLUX DISEASE WITHOUT ESOPHAGITIS: ICD-10-CM

## 2022-12-29 RX ORDER — FUROSEMIDE 40 MG/1
TABLET ORAL
Qty: 28 TABLET | Refills: 0 | Status: SHIPPED | OUTPATIENT
Start: 2022-12-29

## 2022-12-29 RX ORDER — ATORVASTATIN CALCIUM 20 MG/1
TABLET, FILM COATED ORAL
Qty: 28 TABLET | Refills: 0 | Status: SHIPPED | OUTPATIENT
Start: 2022-12-29

## 2022-12-29 RX ORDER — OMEPRAZOLE 20 MG/1
CAPSULE, DELAYED RELEASE ORAL
Qty: 28 CAPSULE | Refills: 0 | Status: SHIPPED | OUTPATIENT
Start: 2022-12-29

## 2022-12-29 RX ORDER — LOSARTAN POTASSIUM 25 MG/1
TABLET ORAL
Qty: 28 TABLET | Refills: 0 | Status: SHIPPED | OUTPATIENT
Start: 2022-12-29

## 2022-12-29 RX ORDER — PYRIDOSTIGMINE BROMIDE 60 MG/1
TABLET ORAL
Qty: 28 TABLET | Refills: 0 | Status: SHIPPED | OUTPATIENT
Start: 2022-12-29

## 2022-12-29 RX ORDER — DULOXETIN HYDROCHLORIDE 60 MG/1
CAPSULE, DELAYED RELEASE ORAL
Qty: 28 CAPSULE | Refills: 0 | Status: SHIPPED | OUTPATIENT
Start: 2022-12-29

## 2023-01-24 DIAGNOSIS — G37.3 TRANSVERSE MYELITIS (HCC): ICD-10-CM

## 2023-01-24 RX ORDER — OXYCODONE HYDROCHLORIDE AND ACETAMINOPHEN 5; 325 MG/1; MG/1
1 TABLET ORAL NIGHTLY
Qty: 30 TABLET | Refills: 0 | Status: SHIPPED | OUTPATIENT
Start: 2023-01-24 | End: 2023-02-23

## 2023-02-27 DIAGNOSIS — G37.3 TRANSVERSE MYELITIS (HCC): ICD-10-CM

## 2023-02-27 RX ORDER — OXYCODONE HYDROCHLORIDE AND ACETAMINOPHEN 5; 325 MG/1; MG/1
1 TABLET ORAL NIGHTLY
Qty: 30 TABLET | Refills: 0 | Status: SHIPPED | OUTPATIENT
Start: 2023-02-27 | End: 2023-03-29

## 2023-03-06 DIAGNOSIS — K21.9 GASTROESOPHAGEAL REFLUX DISEASE WITHOUT ESOPHAGITIS: ICD-10-CM

## 2023-03-06 DIAGNOSIS — E11.9 CONTROLLED TYPE 2 DIABETES MELLITUS WITHOUT COMPLICATION, WITHOUT LONG-TERM CURRENT USE OF INSULIN (HCC): ICD-10-CM

## 2023-03-06 DIAGNOSIS — I10 ESSENTIAL HYPERTENSION: ICD-10-CM

## 2023-03-06 RX ORDER — FUROSEMIDE 40 MG/1
TABLET ORAL
Qty: 28 TABLET | Refills: 0 | Status: SHIPPED | OUTPATIENT
Start: 2023-03-06

## 2023-03-06 RX ORDER — DULOXETIN HYDROCHLORIDE 60 MG/1
CAPSULE, DELAYED RELEASE ORAL
Qty: 28 CAPSULE | Refills: 0 | Status: SHIPPED | OUTPATIENT
Start: 2023-03-06

## 2023-03-06 RX ORDER — ATORVASTATIN CALCIUM 20 MG/1
TABLET, FILM COATED ORAL
Qty: 28 TABLET | Refills: 0 | Status: SHIPPED | OUTPATIENT
Start: 2023-03-06

## 2023-03-06 RX ORDER — LOSARTAN POTASSIUM 25 MG/1
TABLET ORAL
Qty: 28 TABLET | Refills: 0 | Status: SHIPPED | OUTPATIENT
Start: 2023-03-06

## 2023-03-06 RX ORDER — OMEPRAZOLE 20 MG/1
CAPSULE, DELAYED RELEASE ORAL
Qty: 28 CAPSULE | Refills: 0 | Status: SHIPPED | OUTPATIENT
Start: 2023-03-06

## 2023-03-06 RX ORDER — PYRIDOSTIGMINE BROMIDE 60 MG/1
TABLET ORAL
Qty: 28 TABLET | Refills: 0 | Status: SHIPPED | OUTPATIENT
Start: 2023-03-06

## 2023-03-15 NOTE — TELEPHONE ENCOUNTER
Pt calling to say that her insurance states that a PA is needed with a Dx for the Pillow wedge. Render Note In Bullet Format When Appropriate: Yes Number Of Freeze-Thaw Cycles: 1 freeze-thaw cycle Duration Of Freeze Thaw-Cycle (Seconds): 10 Detail Level: Detailed Consent: The patient's consent was obtained including but not limited to risks of crusting, scabbing, blistering, scarring, darker or lighter pigmentary change, recurrence, incomplete removal and infection. Show Aperture Variable?: No Application Tool (Optional): Cry-AC Post-Care Instructions: I reviewed with the patient in detail post-care instructions. Patient is to wear sunprotection, and avoid picking at any of the treated lesions. Pt may apply Vaseline to crusted or scabbing areas.

## 2023-03-21 ENCOUNTER — OFFICE VISIT (OUTPATIENT)
Dept: PRIMARY CARE CLINIC | Age: 68
End: 2023-03-21

## 2023-03-21 VITALS — HEART RATE: 86 BPM | DIASTOLIC BLOOD PRESSURE: 84 MMHG | OXYGEN SATURATION: 96 % | SYSTOLIC BLOOD PRESSURE: 152 MMHG

## 2023-03-21 DIAGNOSIS — E11.9 CONTROLLED TYPE 2 DIABETES MELLITUS WITHOUT COMPLICATION, WITHOUT LONG-TERM CURRENT USE OF INSULIN (HCC): Primary | ICD-10-CM

## 2023-03-21 DIAGNOSIS — Z12.31 ENCOUNTER FOR SCREENING MAMMOGRAM FOR MALIGNANT NEOPLASM OF BREAST: ICD-10-CM

## 2023-03-21 DIAGNOSIS — I48.20 CHRONIC ATRIAL FIBRILLATION (HCC): ICD-10-CM

## 2023-03-21 DIAGNOSIS — I10 ESSENTIAL HYPERTENSION: ICD-10-CM

## 2023-03-21 LAB — HBA1C MFR BLD: 5.9 %

## 2023-03-21 RX ORDER — DULOXETIN HYDROCHLORIDE 60 MG/1
60 CAPSULE, DELAYED RELEASE ORAL DAILY
Qty: 90 CAPSULE | Refills: 3 | Status: SHIPPED | OUTPATIENT
Start: 2023-03-21

## 2023-03-21 RX ORDER — POTASSIUM CHLORIDE 750 MG/1
10 TABLET, EXTENDED RELEASE ORAL 2 TIMES DAILY
Qty: 180 TABLET | Refills: 3 | Status: SHIPPED | OUTPATIENT
Start: 2023-03-21

## 2023-03-21 RX ORDER — LOSARTAN POTASSIUM 25 MG/1
25 TABLET ORAL DAILY
Qty: 90 TABLET | Refills: 3 | Status: SHIPPED | OUTPATIENT
Start: 2023-03-21

## 2023-03-21 RX ORDER — FUROSEMIDE 40 MG/1
40 TABLET ORAL DAILY
Qty: 90 TABLET | Refills: 3 | Status: SHIPPED | OUTPATIENT
Start: 2023-03-21

## 2023-03-21 RX ORDER — ATORVASTATIN CALCIUM 20 MG/1
20 TABLET, FILM COATED ORAL NIGHTLY
Qty: 90 TABLET | Refills: 3 | Status: SHIPPED | OUTPATIENT
Start: 2023-03-21

## 2023-03-21 SDOH — ECONOMIC STABILITY: HOUSING INSECURITY
IN THE LAST 12 MONTHS, WAS THERE A TIME WHEN YOU DID NOT HAVE A STEADY PLACE TO SLEEP OR SLEPT IN A SHELTER (INCLUDING NOW)?: NO

## 2023-03-21 SDOH — ECONOMIC STABILITY: INCOME INSECURITY: HOW HARD IS IT FOR YOU TO PAY FOR THE VERY BASICS LIKE FOOD, HOUSING, MEDICAL CARE, AND HEATING?: NOT HARD AT ALL

## 2023-03-21 SDOH — ECONOMIC STABILITY: FOOD INSECURITY: WITHIN THE PAST 12 MONTHS, THE FOOD YOU BOUGHT JUST DIDN'T LAST AND YOU DIDN'T HAVE MONEY TO GET MORE.: NEVER TRUE

## 2023-03-21 SDOH — ECONOMIC STABILITY: FOOD INSECURITY: WITHIN THE PAST 12 MONTHS, YOU WORRIED THAT YOUR FOOD WOULD RUN OUT BEFORE YOU GOT MONEY TO BUY MORE.: NEVER TRUE

## 2023-03-21 ASSESSMENT — PATIENT HEALTH QUESTIONNAIRE - PHQ9
SUM OF ALL RESPONSES TO PHQ9 QUESTIONS 1 & 2: 0
SUM OF ALL RESPONSES TO PHQ QUESTIONS 1-9: 0
SUM OF ALL RESPONSES TO PHQ QUESTIONS 1-9: 0
2. FEELING DOWN, DEPRESSED OR HOPELESS: 0
1. LITTLE INTEREST OR PLEASURE IN DOING THINGS: 0
SUM OF ALL RESPONSES TO PHQ QUESTIONS 1-9: 0
SUM OF ALL RESPONSES TO PHQ QUESTIONS 1-9: 0

## 2023-03-21 ASSESSMENT — ENCOUNTER SYMPTOMS
SHORTNESS OF BREATH: 0
RHINORRHEA: 0
SORE THROAT: 0
WHEEZING: 0
DIARRHEA: 0
EYE REDNESS: 0
ABDOMINAL PAIN: 0
EYE DISCHARGE: 0
COUGH: 0
NAUSEA: 0
VOMITING: 0

## 2023-03-21 NOTE — PROGRESS NOTES
hemoglobin (Hb A1C)     Orders Placed This Encounter   Medications    metFORMIN (GLUCOPHAGE) 500 MG tablet     Sig: Take 1 tablet by mouth daily (with breakfast)     Dispense:  90 tablet     Refill:  3    DULoxetine (CYMBALTA) 60 MG extended release capsule     Sig: Take 1 capsule by mouth daily     Dispense:  90 capsule     Refill:  3    atorvastatin (LIPITOR) 20 MG tablet     Sig: Take 1 tablet by mouth nightly     Dispense:  90 tablet     Refill:  3    furosemide (LASIX) 40 MG tablet     Sig: Take 1 tablet by mouth daily     Dispense:  90 tablet     Refill:  3    potassium chloride (KLOR-CON M10) 10 MEQ extended release tablet     Sig: Take 1 tablet by mouth 2 times daily     Dispense:  180 tablet     Refill:  3    metoprolol tartrate (LOPRESSOR) 25 MG tablet     Sig: Take 1 tablet by mouth 2 times daily     Dispense:  180 tablet     Refill:  3    losartan (COZAAR) 25 MG tablet     Sig: Take 1 tablet by mouth daily     Dispense:  90 tablet     Refill:  3       Patient given educational materials - see patient instructions. Discussed use, benefit, and side effects of prescribed medications. All patient questions answered. Pt voiced understanding. Reviewed health maintenance. Instructed to continue current medications, diet andexercise. Patient agreed with treatment plan. Follow up as directed.      Electronicallysigned by Shivam Ward MD on 3/21/2023 at 11:30 AM

## 2023-03-27 DIAGNOSIS — G37.3 TRANSVERSE MYELITIS (HCC): ICD-10-CM

## 2023-03-27 LAB
A/G RATIO: NORMAL
ALBUMIN SERPL-MCNC: NORMAL G/DL
ALP BLD-CCNC: NORMAL U/L
ALT SERPL-CCNC: NORMAL U/L
ANION GAP SERPL CALCULATED.3IONS-SCNC: NORMAL MMOL/L
AST SERPL-CCNC: NORMAL U/L
BILIRUB SERPL-MCNC: NORMAL MG/DL
BILIRUBIN DIRECT: NORMAL
BILIRUBIN, INDIRECT: NORMAL
BUN BLDV-MCNC: NORMAL MG/DL
BUN/CREAT BLD: NORMAL
CALCIUM SERPL-MCNC: NORMAL MG/DL
CHLORIDE BLD-SCNC: NORMAL MMOL/L
CHOLESTEROL, FASTING: 190
CO2: NORMAL
CREAT SERPL-MCNC: NORMAL MG/DL
CREATININE URINE: 32.83 MG/DL
GFR AFRICAN AMERICAN: NORMAL
GFR NON-AFRICAN AMERICAN: NORMAL
GLOBULIN: NORMAL
GLUCOSE FASTING: 127 MG/DL
HDLC SERPL-MCNC: 52 MG/DL (ref 35–70)
LDL CHOLESTEROL CALCULATED: 115 MG/DL (ref 0–160)
MICROALBUMIN/CREAT 24H UR: 3.6 MG/G{CREAT}
POTASSIUM SERPL-SCNC: NORMAL MMOL/L
PROTEIN TOTAL: NORMAL
SODIUM BLD-SCNC: NORMAL MMOL/L
TRIGLYCERIDE, FASTING: 113

## 2023-03-27 RX ORDER — OXYCODONE HYDROCHLORIDE AND ACETAMINOPHEN 5; 325 MG/1; MG/1
1 TABLET ORAL NIGHTLY
Qty: 30 TABLET | Refills: 0 | Status: SHIPPED | OUTPATIENT
Start: 2023-03-27 | End: 2023-04-26

## 2023-03-27 NOTE — TELEPHONE ENCOUNTER
LAST VISIT:   3/21/2023     Future Appointments   Date Time Provider Jarrell Lou   7/24/2023 11:20 AM MD HAKEEM Goodwin

## 2023-03-28 DIAGNOSIS — Z00.00 ANNUAL PHYSICAL EXAM: ICD-10-CM

## 2023-03-28 DIAGNOSIS — Z13.220 ENCOUNTER FOR LIPID SCREENING FOR CARDIOVASCULAR DISEASE: ICD-10-CM

## 2023-03-28 DIAGNOSIS — Z13.6 ENCOUNTER FOR LIPID SCREENING FOR CARDIOVASCULAR DISEASE: ICD-10-CM

## 2023-03-28 DIAGNOSIS — E11.9 CONTROLLED TYPE 2 DIABETES MELLITUS WITHOUT COMPLICATION, WITHOUT LONG-TERM CURRENT USE OF INSULIN (HCC): ICD-10-CM

## 2023-04-03 DIAGNOSIS — K21.9 GASTROESOPHAGEAL REFLUX DISEASE WITHOUT ESOPHAGITIS: ICD-10-CM

## 2023-04-03 RX ORDER — PYRIDOSTIGMINE BROMIDE 60 MG/1
TABLET ORAL
Qty: 28 TABLET | Refills: 0 | Status: SHIPPED | OUTPATIENT
Start: 2023-04-03

## 2023-04-03 RX ORDER — OMEPRAZOLE 20 MG/1
CAPSULE, DELAYED RELEASE ORAL
Qty: 28 CAPSULE | Refills: 0 | Status: SHIPPED | OUTPATIENT
Start: 2023-04-03

## 2023-04-03 NOTE — TELEPHONE ENCOUNTER
LAST VISIT:   3/21/2023     Future Appointments   Date Time Provider Department Center   7/24/2023 11:20 AM MD HAKEEM Reis [facility]

## 2023-05-01 DIAGNOSIS — K21.9 GASTROESOPHAGEAL REFLUX DISEASE WITHOUT ESOPHAGITIS: ICD-10-CM

## 2023-05-01 DIAGNOSIS — G37.3 TRANSVERSE MYELITIS (HCC): ICD-10-CM

## 2023-05-01 RX ORDER — OXYCODONE HYDROCHLORIDE AND ACETAMINOPHEN 5; 325 MG/1; MG/1
1 TABLET ORAL NIGHTLY
Qty: 30 TABLET | Refills: 0 | Status: SHIPPED | OUTPATIENT
Start: 2023-05-01 | End: 2023-05-31

## 2023-05-01 RX ORDER — PYRIDOSTIGMINE BROMIDE 60 MG/1
TABLET ORAL
Qty: 28 TABLET | Refills: 0 | Status: SHIPPED | OUTPATIENT
Start: 2023-05-01

## 2023-05-01 RX ORDER — OMEPRAZOLE 20 MG/1
CAPSULE, DELAYED RELEASE ORAL
Qty: 28 CAPSULE | Refills: 0 | Status: SHIPPED | OUTPATIENT
Start: 2023-05-01

## 2023-05-01 NOTE — TELEPHONE ENCOUNTER
LAST VISIT:   3/21/2023     Future Appointments   Date Time Provider Jarrell Lou   7/24/2023 11:20 AM MD HAKEEM Hand

## 2023-05-30 DIAGNOSIS — G37.3 TRANSVERSE MYELITIS (HCC): ICD-10-CM

## 2023-05-30 RX ORDER — CYCLOBENZAPRINE HCL 10 MG
TABLET ORAL
Qty: 60 TABLET | Refills: 0 | Status: SHIPPED | OUTPATIENT
Start: 2023-05-30

## 2023-05-30 RX ORDER — OXYCODONE HYDROCHLORIDE AND ACETAMINOPHEN 5; 325 MG/1; MG/1
1 TABLET ORAL NIGHTLY
Qty: 30 TABLET | Refills: 0 | Status: SHIPPED | OUTPATIENT
Start: 2023-05-30 | End: 2023-06-29

## 2023-05-30 NOTE — TELEPHONE ENCOUNTER
LAST VISIT:   3/21/2023     Future Appointments   Date Time Provider Jarrell Lou   7/24/2023 11:20 AM MD HAKEEM Sharif Repress

## 2023-05-30 NOTE — TELEPHONE ENCOUNTER
LAST VISIT:   3/21/2023     Future Appointments   Date Time Provider Jarrell Lou   7/24/2023 11:20 AM MD HAKEEM Johnson [facility] Dexter Patel

## 2023-05-31 DIAGNOSIS — K21.9 GASTROESOPHAGEAL REFLUX DISEASE WITHOUT ESOPHAGITIS: ICD-10-CM

## 2023-05-31 RX ORDER — PYRIDOSTIGMINE BROMIDE 60 MG/1
TABLET ORAL
Qty: 28 TABLET | Refills: 0 | Status: SHIPPED | OUTPATIENT
Start: 2023-05-31

## 2023-05-31 RX ORDER — OMEPRAZOLE 20 MG/1
CAPSULE, DELAYED RELEASE ORAL
Qty: 28 CAPSULE | Refills: 0 | Status: SHIPPED | OUTPATIENT
Start: 2023-05-31

## 2023-05-31 NOTE — TELEPHONE ENCOUNTER
LAST VISIT:   03/21/2023    Future Appointments   Date Time Provider Jarrell Lou   7/24/2023 11:20 AM MD HAKEEM Moses [facility] Everlene Bosworth

## 2023-06-27 DIAGNOSIS — K21.9 GASTROESOPHAGEAL REFLUX DISEASE WITHOUT ESOPHAGITIS: ICD-10-CM

## 2023-06-27 RX ORDER — OMEPRAZOLE 20 MG/1
CAPSULE, DELAYED RELEASE ORAL
Qty: 28 CAPSULE | Refills: 0 | Status: SHIPPED | OUTPATIENT
Start: 2023-06-27

## 2023-06-27 RX ORDER — PYRIDOSTIGMINE BROMIDE 60 MG/1
TABLET ORAL
Qty: 28 TABLET | Refills: 0 | Status: SHIPPED | OUTPATIENT
Start: 2023-06-27

## 2023-06-29 DIAGNOSIS — G37.3 TRANSVERSE MYELITIS (HCC): ICD-10-CM

## 2023-06-29 RX ORDER — OXYCODONE HYDROCHLORIDE AND ACETAMINOPHEN 5; 325 MG/1; MG/1
1 TABLET ORAL NIGHTLY
Qty: 30 TABLET | Refills: 0 | Status: SHIPPED | OUTPATIENT
Start: 2023-06-29 | End: 2023-07-29

## 2023-07-24 ENCOUNTER — OFFICE VISIT (OUTPATIENT)
Dept: PRIMARY CARE CLINIC | Age: 68
End: 2023-07-24
Payer: COMMERCIAL

## 2023-07-24 VITALS
OXYGEN SATURATION: 96 % | DIASTOLIC BLOOD PRESSURE: 100 MMHG | SYSTOLIC BLOOD PRESSURE: 122 MMHG | HEART RATE: 79 BPM | BODY MASS INDEX: 40.74 KG/M2 | WEIGHT: 230 LBS

## 2023-07-24 DIAGNOSIS — Z12.11 ENCOUNTER FOR SCREENING FOR MALIGNANT NEOPLASM OF COLON: ICD-10-CM

## 2023-07-24 DIAGNOSIS — K21.9 GASTROESOPHAGEAL REFLUX DISEASE WITHOUT ESOPHAGITIS: ICD-10-CM

## 2023-07-24 DIAGNOSIS — E11.9 CONTROLLED TYPE 2 DIABETES MELLITUS WITHOUT COMPLICATION, WITHOUT LONG-TERM CURRENT USE OF INSULIN (HCC): Primary | ICD-10-CM

## 2023-07-24 DIAGNOSIS — G37.3 TRANSVERSE MYELITIS (HCC): ICD-10-CM

## 2023-07-24 LAB — HBA1C MFR BLD: 6.3 %

## 2023-07-24 PROCEDURE — 3044F HG A1C LEVEL LT 7.0%: CPT | Performed by: FAMILY MEDICINE

## 2023-07-24 PROCEDURE — 83037 HB GLYCOSYLATED A1C HOME DEV: CPT | Performed by: FAMILY MEDICINE

## 2023-07-24 PROCEDURE — 99214 OFFICE O/P EST MOD 30 MIN: CPT | Performed by: FAMILY MEDICINE

## 2023-07-24 PROCEDURE — 3074F SYST BP LT 130 MM HG: CPT | Performed by: FAMILY MEDICINE

## 2023-07-24 PROCEDURE — 1123F ACP DISCUSS/DSCN MKR DOCD: CPT | Performed by: FAMILY MEDICINE

## 2023-07-24 PROCEDURE — 3080F DIAST BP >= 90 MM HG: CPT | Performed by: FAMILY MEDICINE

## 2023-07-24 RX ORDER — CYCLOBENZAPRINE HCL 10 MG
10 TABLET ORAL 2 TIMES DAILY
Qty: 180 TABLET | Refills: 3 | Status: SHIPPED | OUTPATIENT
Start: 2023-07-24

## 2023-07-24 RX ORDER — OXYCODONE HYDROCHLORIDE AND ACETAMINOPHEN 5; 325 MG/1; MG/1
1 TABLET ORAL NIGHTLY
Qty: 30 TABLET | Refills: 0 | Status: SHIPPED | OUTPATIENT
Start: 2023-07-24 | End: 2023-08-23

## 2023-07-24 ASSESSMENT — ENCOUNTER SYMPTOMS
RHINORRHEA: 0
EYE DISCHARGE: 0
ABDOMINAL PAIN: 0
WHEEZING: 0
EYE REDNESS: 0
SHORTNESS OF BREATH: 0
COUGH: 0
DIARRHEA: 0
VOMITING: 0
NAUSEA: 0
SORE THROAT: 0

## 2023-07-24 NOTE — PROGRESS NOTES
[address] PRIMARY CARE  [address]  [facility] [address]  Dept: [phone]    Gerber Resendiz is a 79 y.o. female Established patient, who presents today for her medical conditions/complaints as noted below. Chief Complaint   Patient presents with    Diabetes       HPI:     HPI  Patient here for diabetes follow-up. Patient states not checking sugars on a regular basis. Denies any low blood sugar reactions. Medication was reviewed    Patient did not get mammogram done as of yet    Patient denies any fevers or chills. No recent change in weight. Patient has no family history of colon cancer. Patient still paraplegic from her transverse myelitis. Still wheelchair-bound. Reviewed prior notes None  Reviewed previous Labs    LDL Cholesterol (mg/dL)   Date Value   10/19/2021 117     LDL Calculated (mg/dL)   Date Value   03/27/2023 115   02/24/2020 117   12/31/2018 75       (goal LDL is <100)   AST (U/L)   Date Value   10/19/2021 13     ALT (U/L)   Date Value   10/19/2021 13     BUN (mg/dL)   Date Value   10/19/2021 14     Hemoglobin A1C (%)   Date Value   07/24/2023 6.3     TSH (mIU/L)   Date Value   10/19/2021 4.07     BP Readings from Last 3 Encounters:   07/24/23 (!) 122/100   03/21/23 (!) 152/84   11/21/22 138/84          (goal 120/80)    Past Medical History:   Diagnosis Date    Acute transverse myelitis in demyelinating disease of central nervous system ([address])     Atrial fibrillation ([address])     Depression     Hyperlipidemia     Hypertension       Past Surgical History:   Procedure Laterality Date    APPENDECTOMY  1988    CHOLECYSTECTOMY, OPEN  1984    FEMUR FRACTURE SURGERY Left 05/2018    HYSTERECTOMY, TOTAL ABDOMINAL (CERVIX REMOVED)  1988       No family history on file.     Social History     Tobacco Use    Smoking status: Never    Smokeless tobacco: Never   Substance Use Topics    Alcohol use: No      Current Outpatient Medications   Medication Sig

## 2023-07-24 NOTE — TELEPHONE ENCOUNTER
LAST VISIT:   3/21/2023     Future Appointments   Date Time Provider [address]   11/28/2023 11:20 AM MD HAKEEM Rodriguez

## 2023-07-25 RX ORDER — RIVAROXABAN 20 MG/1
TABLET, FILM COATED ORAL
Qty: 28 TABLET | Refills: 0 | Status: SHIPPED | OUTPATIENT
Start: 2023-07-25

## 2023-07-25 RX ORDER — POTASSIUM CHLORIDE 750 MG/1
TABLET, EXTENDED RELEASE ORAL
Qty: 56 TABLET | Refills: 0 | Status: SHIPPED | OUTPATIENT
Start: 2023-07-25

## 2023-07-25 RX ORDER — OMEPRAZOLE 20 MG/1
CAPSULE, DELAYED RELEASE ORAL
Qty: 28 CAPSULE | Refills: 0 | Status: SHIPPED | OUTPATIENT
Start: 2023-07-25

## 2023-07-25 RX ORDER — PYRIDOSTIGMINE BROMIDE 60 MG/1
TABLET ORAL
Qty: 28 TABLET | Refills: 0 | Status: SHIPPED | OUTPATIENT
Start: 2023-07-25

## 2023-07-31 ENCOUNTER — TELEPHONE (OUTPATIENT)
Dept: PRIMARY CARE CLINIC | Age: 68
End: 2023-07-31

## 2023-07-31 NOTE — TELEPHONE ENCOUNTER
Patient called stating that she needs an order for a stand to lift machine. The sammi lift would not work for her. So the patient is asking for the stand to lift machine order to be sent to her insurance.

## 2023-08-14 ENCOUNTER — TELEPHONE (OUTPATIENT)
Dept: PRIMARY CARE CLINIC | Age: 68
End: 2023-08-14

## 2023-08-14 NOTE — TELEPHONE ENCOUNTER
Pt asking if you received a form from [employer], pt not sure name RE: [employer]? Pt can't use a cab. I checked with Trixie Cole and she thought she gave it to you. Was faxed twice, per pt. Not in Dr. Ian Sequeira in box, or your folder. Let pt know.   Co. # is [phone]

## 2023-08-21 DIAGNOSIS — K21.9 GASTROESOPHAGEAL REFLUX DISEASE WITHOUT ESOPHAGITIS: ICD-10-CM

## 2023-08-21 RX ORDER — POTASSIUM CHLORIDE 750 MG/1
TABLET, EXTENDED RELEASE ORAL
Qty: 56 TABLET | Refills: 0 | Status: SHIPPED | OUTPATIENT
Start: 2023-08-21

## 2023-08-21 RX ORDER — RIVAROXABAN 20 MG/1
TABLET, FILM COATED ORAL
Qty: 28 TABLET | Refills: 0 | Status: SHIPPED | OUTPATIENT
Start: 2023-08-21

## 2023-08-21 RX ORDER — PYRIDOSTIGMINE BROMIDE 60 MG/1
TABLET ORAL
Qty: 28 TABLET | Refills: 0 | Status: SHIPPED | OUTPATIENT
Start: 2023-08-21

## 2023-08-21 RX ORDER — OMEPRAZOLE 20 MG/1
CAPSULE, DELAYED RELEASE ORAL
Qty: 28 CAPSULE | Refills: 0 | Status: SHIPPED | OUTPATIENT
Start: 2023-08-21

## 2023-08-29 DIAGNOSIS — G37.3 TRANSVERSE MYELITIS (HCC): ICD-10-CM

## 2023-08-29 RX ORDER — OXYCODONE HYDROCHLORIDE AND ACETAMINOPHEN 5; 325 MG/1; MG/1
1 TABLET ORAL NIGHTLY
Qty: 30 TABLET | Refills: 0 | Status: SHIPPED | OUTPATIENT
Start: 2023-08-29 | End: 2023-09-28

## 2023-09-18 DIAGNOSIS — K21.9 GASTROESOPHAGEAL REFLUX DISEASE WITHOUT ESOPHAGITIS: ICD-10-CM

## 2023-09-18 RX ORDER — PYRIDOSTIGMINE BROMIDE 60 MG/1
TABLET ORAL
Qty: 28 TABLET | Refills: 0 | Status: SHIPPED | OUTPATIENT
Start: 2023-09-18

## 2023-09-18 RX ORDER — RIVAROXABAN 20 MG/1
TABLET, FILM COATED ORAL
Qty: 28 TABLET | Refills: 0 | Status: SHIPPED | OUTPATIENT
Start: 2023-09-18

## 2023-09-18 RX ORDER — POTASSIUM CHLORIDE 750 MG/1
TABLET, EXTENDED RELEASE ORAL
Qty: 56 TABLET | Refills: 0 | Status: SHIPPED | OUTPATIENT
Start: 2023-09-18

## 2023-09-18 RX ORDER — OMEPRAZOLE 20 MG/1
CAPSULE, DELAYED RELEASE ORAL
Qty: 28 CAPSULE | Refills: 0 | Status: SHIPPED | OUTPATIENT
Start: 2023-09-18

## 2023-09-18 NOTE — TELEPHONE ENCOUNTER
LAST VISIT:   7/24/2023     Future Appointments   Date Time Provider [address]   11/28/2023 11:20 AM Eddy Seip, MD [facility] Morley Fielding

## 2023-09-29 DIAGNOSIS — G37.3 TRANSVERSE MYELITIS (HCC): ICD-10-CM

## 2023-09-29 RX ORDER — OXYCODONE HYDROCHLORIDE AND ACETAMINOPHEN 5; 325 MG/1; MG/1
1 TABLET ORAL NIGHTLY
Qty: 30 TABLET | Refills: 0 | Status: SHIPPED | OUTPATIENT
Start: 2023-09-29 | End: 2023-10-29

## 2023-09-29 NOTE — TELEPHONE ENCOUNTER
LAST VISIT:   7/24/2023     Future Appointments   Date Time Provider [address]   11/28/2023 11:20 AM Christia Crofts, MD [facility] Cyrena Amara

## 2023-10-13 DIAGNOSIS — K21.9 GASTROESOPHAGEAL REFLUX DISEASE WITHOUT ESOPHAGITIS: ICD-10-CM

## 2023-10-13 RX ORDER — RIVAROXABAN 20 MG/1
TABLET, FILM COATED ORAL
Qty: 28 TABLET | Refills: 0 | Status: SHIPPED | OUTPATIENT
Start: 2023-10-13

## 2023-10-13 RX ORDER — POTASSIUM CHLORIDE 750 MG/1
TABLET, EXTENDED RELEASE ORAL
Qty: 56 TABLET | Refills: 0 | Status: SHIPPED | OUTPATIENT
Start: 2023-10-13

## 2023-10-13 RX ORDER — OMEPRAZOLE 20 MG/1
CAPSULE, DELAYED RELEASE ORAL
Qty: 28 CAPSULE | Refills: 0 | Status: SHIPPED | OUTPATIENT
Start: 2023-10-13

## 2023-10-13 RX ORDER — PYRIDOSTIGMINE BROMIDE 60 MG/1
TABLET ORAL
Qty: 28 TABLET | Refills: 0 | Status: SHIPPED | OUTPATIENT
Start: 2023-10-13

## 2023-10-13 NOTE — TELEPHONE ENCOUNTER
LAST VISIT:   7/24/2023     Future Appointments   Date Time Provider [address]   11/28/2023 11:20 AM  MD HAKEEM Hurley

## 2023-11-01 ENCOUNTER — TELEPHONE (OUTPATIENT)
Dept: PRIMARY CARE CLINIC | Age: 68
End: 2023-11-01

## 2023-11-01 DIAGNOSIS — I89.0 LYMPHEDEMA OF BOTH LOWER EXTREMITIES: Primary | ICD-10-CM

## 2023-11-01 DIAGNOSIS — I89.0 LYMPHEDEMA, NOT ELSEWHERE CLASSIFIED: ICD-10-CM

## 2023-11-01 NOTE — TELEPHONE ENCOUNTER
Patient is requesting a referral for in-home physical therapy to be done by [address] for patients edema.     [address]:  Ph: [phone]  Fax: [phone]

## 2023-11-06 DIAGNOSIS — G37.3 TRANSVERSE MYELITIS (HCC): ICD-10-CM

## 2023-11-06 RX ORDER — OXYCODONE HYDROCHLORIDE AND ACETAMINOPHEN 5; 325 MG/1; MG/1
1 TABLET ORAL NIGHTLY
Qty: 30 TABLET | Refills: 0 | Status: SHIPPED | OUTPATIENT
Start: 2023-11-06 | End: 2023-12-06

## 2023-11-10 DIAGNOSIS — K21.9 GASTROESOPHAGEAL REFLUX DISEASE WITHOUT ESOPHAGITIS: ICD-10-CM

## 2023-11-10 RX ORDER — OMEPRAZOLE 20 MG/1
CAPSULE, DELAYED RELEASE ORAL
Qty: 28 CAPSULE | Refills: 0 | Status: SHIPPED | OUTPATIENT
Start: 2023-11-10

## 2023-11-10 RX ORDER — PYRIDOSTIGMINE BROMIDE 60 MG/1
TABLET ORAL
Qty: 28 TABLET | Refills: 0 | Status: SHIPPED | OUTPATIENT
Start: 2023-11-10

## 2023-11-10 RX ORDER — RIVAROXABAN 20 MG/1
TABLET, FILM COATED ORAL
Qty: 28 TABLET | Refills: 0 | Status: SHIPPED | OUTPATIENT
Start: 2023-11-10

## 2023-11-10 RX ORDER — POTASSIUM CHLORIDE 750 MG/1
TABLET, EXTENDED RELEASE ORAL
Qty: 56 TABLET | Refills: 0 | Status: SHIPPED | OUTPATIENT
Start: 2023-11-10

## 2023-11-10 NOTE — TELEPHONE ENCOUNTER
LAST VISIT:   7/24/2023     Future Appointments   Date Time Provider [address]   11/28/2023 11:20 AM MD HAKEEM Sánchez PC Xavier Canela

## 2023-11-13 ENCOUNTER — TELEPHONE (OUTPATIENT)
Dept: PRIMARY CARE CLINIC | Age: 68
End: 2023-11-13

## 2023-11-13 DIAGNOSIS — M79.89 RIGHT LEG SWELLING: ICD-10-CM

## 2023-11-13 DIAGNOSIS — M79.604 PAIN OF RIGHT LOWER EXTREMITY: Primary | ICD-10-CM

## 2023-11-13 RX ORDER — DOXYCYCLINE HYCLATE 100 MG
100 TABLET ORAL 2 TIMES DAILY
Qty: 20 TABLET | Refills: 0 | Status: SHIPPED | OUTPATIENT
Start: 2023-11-13 | End: 2023-11-23

## 2023-11-13 NOTE — TELEPHONE ENCOUNTER
Cierra RN with [facility] states pt's leg appears to be getting cellulitis. Lower right leg swollen, red, warm to touch, no pitting but above knee is deep red with pitting and swelling.  Pharm [facility] Lazara Duggan

## 2023-11-28 ENCOUNTER — OFFICE VISIT (OUTPATIENT)
Dept: PRIMARY CARE CLINIC | Age: 68
End: 2023-11-28
Payer: COMMERCIAL

## 2023-11-28 VITALS — OXYGEN SATURATION: 96 % | HEART RATE: 88 BPM | DIASTOLIC BLOOD PRESSURE: 102 MMHG | SYSTOLIC BLOOD PRESSURE: 136 MMHG

## 2023-11-28 DIAGNOSIS — Z23 NEED FOR VACCINATION: ICD-10-CM

## 2023-11-28 DIAGNOSIS — Z13.6 ENCOUNTER FOR LIPID SCREENING FOR CARDIOVASCULAR DISEASE: ICD-10-CM

## 2023-11-28 DIAGNOSIS — R53.83 FATIGUE, UNSPECIFIED TYPE: ICD-10-CM

## 2023-11-28 DIAGNOSIS — Z79.01 ON CONTINUOUS ORAL ANTICOAGULATION: ICD-10-CM

## 2023-11-28 DIAGNOSIS — Z00.00 ANNUAL PHYSICAL EXAM: ICD-10-CM

## 2023-11-28 DIAGNOSIS — E11.9 CONTROLLED TYPE 2 DIABETES MELLITUS WITHOUT COMPLICATION, WITHOUT LONG-TERM CURRENT USE OF INSULIN (HCC): ICD-10-CM

## 2023-11-28 DIAGNOSIS — Z13.220 ENCOUNTER FOR LIPID SCREENING FOR CARDIOVASCULAR DISEASE: ICD-10-CM

## 2023-11-28 DIAGNOSIS — Z00.00 MEDICARE ANNUAL WELLNESS VISIT, SUBSEQUENT: Primary | ICD-10-CM

## 2023-11-28 DIAGNOSIS — Z12.31 ENCOUNTER FOR SCREENING MAMMOGRAM FOR MALIGNANT NEOPLASM OF BREAST: ICD-10-CM

## 2023-11-28 LAB
ABSOLUTE IMMATURE GRANULOCYTE: <0.03 K/UL (ref 0–0.3)
ALBUMIN SERPL-MCNC: 3.8 G/DL (ref 3.5–5.2)
ALBUMIN/GLOBULIN RATIO: 1.1 (ref 1–2.5)
ALP BLD-CCNC: 74 U/L (ref 35–104)
ALT SERPL-CCNC: 14 U/L (ref 5–33)
ANION GAP SERPL CALCULATED.3IONS-SCNC: 9 MMOL/L (ref 9–17)
AST SERPL-CCNC: 15 U/L
BASOPHILS ABSOLUTE: 0.03 K/UL (ref 0–0.2)
BASOPHILS RELATIVE PERCENT: 0 % (ref 0–2)
BILIRUB SERPL-MCNC: 0.8 MG/DL (ref 0.3–1.2)
BILIRUBIN DIRECT: 0.2 MG/DL
BILIRUBIN, INDIRECT: 0.6 MG/DL (ref 0–1)
BUN BLDV-MCNC: 19 MG/DL (ref 8–23)
CALCIUM SERPL-MCNC: 9.3 MG/DL (ref 8.6–10.4)
CHLORIDE BLD-SCNC: 105 MMOL/L (ref 98–107)
CHOLESTEROL, FASTING: 191 MG/DL
CHOLESTEROL/HDL RATIO: 4.2
CO2: 30 MMOL/L (ref 20–31)
CREAT SERPL-MCNC: 0.6 MG/DL (ref 0.5–0.9)
EOSINOPHILS ABSOLUTE: 0.06 K/UL (ref 0–0.44)
EOSINOPHILS RELATIVE PERCENT: 1 % (ref 1–4)
GFR SERPL CREATININE-BSD FRML MDRD: >60 ML/MIN/1.73M2
GLUCOSE FASTING: 104 MG/DL (ref 70–99)
HBA1C MFR BLD: 6.6 %
HCT VFR BLD CALC: 45.3 % (ref 36.3–47.1)
HDLC SERPL-MCNC: 45 MG/DL
HEMOGLOBIN: 14 G/DL (ref 11.9–15.1)
IMMATURE GRANULOCYTES: 0 %
LDL CHOLESTEROL: 125 MG/DL (ref 0–130)
LYMPHOCYTES ABSOLUTE: 3.02 K/UL (ref 1.1–3.7)
LYMPHOCYTES RELATIVE PERCENT: 41 % (ref 24–43)
MCH RBC QN AUTO: 30.9 PG (ref 25.2–33.5)
MCHC RBC AUTO-ENTMCNC: 30.9 G/DL (ref 28.4–34.8)
MCV RBC AUTO: 100 FL (ref 82.6–102.9)
MONOCYTES ABSOLUTE: 0.41 K/UL (ref 0.1–1.2)
MONOCYTES RELATIVE PERCENT: 6 % (ref 3–12)
NEUTROPHILS ABSOLUTE: 3.92 K/UL (ref 1.5–8.1)
NEUTROPHILS RELATIVE PERCENT: 52 % (ref 36–65)
NRBC AUTOMATED: 0 PER 100 WBC
PDW BLD-RTO: 13.9 % (ref 11.8–14.4)
PLATELET # BLD: 243 K/UL (ref 138–453)
PMV BLD AUTO: 11.1 FL (ref 8.1–13.5)
POTASSIUM SERPL-SCNC: 3.7 MMOL/L (ref 3.7–5.3)
RBC # BLD: 4.53 M/UL (ref 3.95–5.11)
SODIUM BLD-SCNC: 144 MMOL/L (ref 135–144)
T4 FREE: 1 NG/DL (ref 0.9–1.7)
TOTAL PROTEIN: 7.4 G/DL (ref 6.4–8.3)
TRIGLYCERIDE, FASTING: 106 MG/DL
TSH SERPL DL<=0.05 MIU/L-ACNC: 3.22 UIU/ML (ref 0.3–5)
WBC # BLD: 7.5 K/UL (ref 3.5–11.3)

## 2023-11-28 PROCEDURE — 3075F SYST BP GE 130 - 139MM HG: CPT | Performed by: FAMILY MEDICINE

## 2023-11-28 PROCEDURE — G0439 PPPS, SUBSEQ VISIT: HCPCS | Performed by: FAMILY MEDICINE

## 2023-11-28 PROCEDURE — 83036 HEMOGLOBIN GLYCOSYLATED A1C: CPT | Performed by: FAMILY MEDICINE

## 2023-11-28 PROCEDURE — 1123F ACP DISCUSS/DSCN MKR DOCD: CPT | Performed by: FAMILY MEDICINE

## 2023-11-28 PROCEDURE — 3044F HG A1C LEVEL LT 7.0%: CPT | Performed by: FAMILY MEDICINE

## 2023-11-28 PROCEDURE — 3080F DIAST BP >= 90 MM HG: CPT | Performed by: FAMILY MEDICINE

## 2023-11-28 PROCEDURE — G0008 ADMIN INFLUENZA VIRUS VAC: HCPCS | Performed by: FAMILY MEDICINE

## 2023-11-28 PROCEDURE — 90694 VACC AIIV4 NO PRSRV 0.5ML IM: CPT | Performed by: FAMILY MEDICINE

## 2023-11-28 ASSESSMENT — PATIENT HEALTH QUESTIONNAIRE - PHQ9
SUM OF ALL RESPONSES TO PHQ QUESTIONS 1-9: 0
1. LITTLE INTEREST OR PLEASURE IN DOING THINGS: 0
SUM OF ALL RESPONSES TO PHQ9 QUESTIONS 1 & 2: 0
2. FEELING DOWN, DEPRESSED OR HOPELESS: 0
SUM OF ALL RESPONSES TO PHQ QUESTIONS 1-9: 0

## 2023-11-28 ASSESSMENT — LIFESTYLE VARIABLES
HOW OFTEN DO YOU HAVE A DRINK CONTAINING ALCOHOL: NEVER
HOW MANY STANDARD DRINKS CONTAINING ALCOHOL DO YOU HAVE ON A TYPICAL DAY: PATIENT DOES NOT DRINK

## 2023-11-28 NOTE — PROGRESS NOTES
this visit:  Allergies  Meds  Med Hx  Surg Hx  Soc Hx  Fam Hx         Increase lasix to 60mg daily for 5 days, then resume 40mg daily  Labs ordered  Dc percocet due to medical board guidelines. Pt declines pain management referral     With patient being wheelchair-bound, has been having difficulties with insurance company and transportation to the procedure done in office appointment. Patient previously had referral for colonoscopy but could not make it due to lack of transportation.     Flu shot today

## 2023-12-07 DIAGNOSIS — K21.9 GASTROESOPHAGEAL REFLUX DISEASE WITHOUT ESOPHAGITIS: ICD-10-CM

## 2023-12-07 RX ORDER — OMEPRAZOLE 20 MG/1
CAPSULE, DELAYED RELEASE ORAL
Qty: 28 CAPSULE | Refills: 0 | Status: SHIPPED | OUTPATIENT
Start: 2023-12-07

## 2023-12-07 RX ORDER — RIVAROXABAN 20 MG/1
TABLET, FILM COATED ORAL
Qty: 28 TABLET | Refills: 0 | Status: SHIPPED | OUTPATIENT
Start: 2023-12-07

## 2023-12-07 RX ORDER — PYRIDOSTIGMINE BROMIDE 60 MG/1
TABLET ORAL
Qty: 28 TABLET | Refills: 0 | Status: SHIPPED | OUTPATIENT
Start: 2023-12-07

## 2023-12-07 RX ORDER — POTASSIUM CHLORIDE 750 MG/1
TABLET, EXTENDED RELEASE ORAL
Qty: 56 TABLET | Refills: 0 | Status: SHIPPED | OUTPATIENT
Start: 2023-12-07

## 2023-12-29 ENCOUNTER — TELEPHONE (OUTPATIENT)
Dept: PRIMARY CARE CLINIC | Age: 68
End: 2023-12-29

## 2023-12-29 RX ORDER — NITROFURANTOIN 25; 75 MG/1; MG/1
100 CAPSULE ORAL 2 TIMES DAILY
Qty: 20 CAPSULE | Refills: 0 | Status: SHIPPED | OUTPATIENT
Start: 2023-12-29 | End: 2024-01-08

## 2023-12-29 NOTE — TELEPHONE ENCOUNTER
Patient is requesting antibiotics for possible UTI. Patient states she's been voiding a lot more then usual, she's noticed blood in her urine and says her urine has a very strong smell of mariajuana. Patient states she has very little pain with urination, no abnormal vaginal discharge. Patient has had symptoms for about 7 days. Pharmacy confirmed. Please advise.

## 2024-01-04 DIAGNOSIS — K21.9 GASTROESOPHAGEAL REFLUX DISEASE WITHOUT ESOPHAGITIS: ICD-10-CM

## 2024-01-04 RX ORDER — PYRIDOSTIGMINE BROMIDE 60 MG/1
TABLET ORAL
Qty: 28 TABLET | Refills: 0 | Status: SHIPPED | OUTPATIENT
Start: 2024-01-04

## 2024-01-04 RX ORDER — RIVAROXABAN 20 MG/1
TABLET, FILM COATED ORAL
Qty: 28 TABLET | Refills: 0 | Status: SHIPPED | OUTPATIENT
Start: 2024-01-04

## 2024-01-04 RX ORDER — POTASSIUM CHLORIDE 750 MG/1
TABLET, EXTENDED RELEASE ORAL
Qty: 56 TABLET | Refills: 0 | Status: SHIPPED | OUTPATIENT
Start: 2024-01-04

## 2024-01-04 RX ORDER — OMEPRAZOLE 20 MG/1
CAPSULE, DELAYED RELEASE ORAL
Qty: 28 CAPSULE | Refills: 0 | Status: SHIPPED | OUTPATIENT
Start: 2024-01-04

## 2024-01-04 NOTE — TELEPHONE ENCOUNTER
LAST VISIT:   11/28/2023     Future Appointments   Date Time Provider Department Center   1/11/2024 11:15 AM [facility]   3/28/2024 10:45 AM Bjorn Yost MD [facility]  [facility]

## 2024-01-11 ENCOUNTER — HOSPITAL ENCOUNTER (OUTPATIENT)
Dept: WOMENS IMAGING | Age: 69
Discharge: HOME | End: 2024-01-13
Attending: FAMILY MEDICINE
Payer: COMMERCIAL

## 2024-01-11 VITALS — WEIGHT: 210 LBS | BODY MASS INDEX: 37.21 KG/M2 | HEIGHT: 63 IN

## 2024-01-11 DIAGNOSIS — Z12.31 ENCOUNTER FOR SCREENING MAMMOGRAM FOR MALIGNANT NEOPLASM OF BREAST: ICD-10-CM

## 2024-01-11 PROCEDURE — 77063 BREAST TOMOSYNTHESIS BI: CPT

## 2024-01-24 ENCOUNTER — TELEPHONE (OUTPATIENT)
Dept: PRIMARY CARE CLINIC | Age: 69
End: 2024-01-24

## 2024-01-24 NOTE — TELEPHONE ENCOUNTER
Liam from [facility] calling to report pt's BP today was 168/101 P-85. All other vitals were fine. Pt did tell her that she has not had a BM in 2 weeks.    [phone]

## 2024-02-01 DIAGNOSIS — K21.9 GASTROESOPHAGEAL REFLUX DISEASE WITHOUT ESOPHAGITIS: ICD-10-CM

## 2024-02-01 NOTE — TELEPHONE ENCOUNTER
LAST VISIT:   11/28/2023     Future Appointments   Date Time Provider Department Center   3/28/2024 10:45 AM Bjorn Yost MD [facility]       Pharmacy verified? Yes     [facility] - [place] - [address] -  [phone] - F [phone]  [address] [place] [address]  Phone: [phone] Fax: [phone]

## 2024-02-01 NOTE — TELEPHONE ENCOUNTER
LAST VISIT:   11/28/2023     Future Appointments   Date Time Provider Department Center   3/28/2024 10:45 AM Bjorn Yost MD [facility]       Pharmacy verified? Yes     [facility] [place] - [address] -  [phone] - F [phone]  [address] [place] [address]  Phone: [phone] Fax: [phone]

## 2024-02-02 RX ORDER — RIVAROXABAN 20 MG/1
TABLET, FILM COATED ORAL
Qty: 28 TABLET | Refills: 0 | Status: SHIPPED | OUTPATIENT
Start: 2024-02-02

## 2024-02-02 RX ORDER — POTASSIUM CHLORIDE 750 MG/1
TABLET, EXTENDED RELEASE ORAL
Qty: 56 TABLET | Refills: 0 | Status: SHIPPED | OUTPATIENT
Start: 2024-02-02

## 2024-02-02 RX ORDER — PYRIDOSTIGMINE BROMIDE 60 MG/1
TABLET ORAL
Qty: 28 TABLET | Refills: 0 | Status: SHIPPED | OUTPATIENT
Start: 2024-02-02

## 2024-02-02 RX ORDER — OMEPRAZOLE 20 MG/1
CAPSULE, DELAYED RELEASE ORAL
Qty: 28 CAPSULE | Refills: 0 | Status: SHIPPED | OUTPATIENT
Start: 2024-02-02

## 2024-02-19 ENCOUNTER — TELEPHONE (OUTPATIENT)
Dept: PRIMARY CARE CLINIC | Age: 69
End: 2024-02-19

## 2024-02-19 NOTE — TELEPHONE ENCOUNTER
Pt asking for orders to go to respite care at [facility]. States she can not get a hold of anyone at [facility] to initiate this, has left messages but no return call . 4/4-4/9. Please advise

## 2024-03-05 DIAGNOSIS — K21.9 GASTROESOPHAGEAL REFLUX DISEASE WITHOUT ESOPHAGITIS: ICD-10-CM

## 2024-03-05 DIAGNOSIS — I10 ESSENTIAL HYPERTENSION: ICD-10-CM

## 2024-03-05 DIAGNOSIS — E11.9 CONTROLLED TYPE 2 DIABETES MELLITUS WITHOUT COMPLICATION, WITHOUT LONG-TERM CURRENT USE OF INSULIN (HCC): ICD-10-CM

## 2024-03-05 RX ORDER — ATORVASTATIN CALCIUM 20 MG/1
TABLET, FILM COATED ORAL
Qty: 28 TABLET | Refills: 0 | Status: SHIPPED | OUTPATIENT
Start: 2024-03-05

## 2024-03-05 RX ORDER — FUROSEMIDE 40 MG/1
TABLET ORAL
Qty: 28 TABLET | Refills: 0 | Status: SHIPPED | OUTPATIENT
Start: 2024-03-05

## 2024-03-05 RX ORDER — RIVAROXABAN 20 MG/1
TABLET, FILM COATED ORAL
Qty: 28 TABLET | Refills: 0 | Status: SHIPPED | OUTPATIENT
Start: 2024-03-05

## 2024-03-05 RX ORDER — PYRIDOSTIGMINE BROMIDE 60 MG/1
TABLET ORAL
Qty: 28 TABLET | Refills: 0 | Status: SHIPPED | OUTPATIENT
Start: 2024-03-05

## 2024-03-05 RX ORDER — OMEPRAZOLE 20 MG/1
CAPSULE, DELAYED RELEASE ORAL
Qty: 28 CAPSULE | Refills: 0 | Status: SHIPPED | OUTPATIENT
Start: 2024-03-05

## 2024-03-05 RX ORDER — POTASSIUM CHLORIDE 750 MG/1
TABLET, EXTENDED RELEASE ORAL
Qty: 56 TABLET | Refills: 0 | Status: SHIPPED | OUTPATIENT
Start: 2024-03-05

## 2024-03-05 RX ORDER — DULOXETIN HYDROCHLORIDE 60 MG/1
CAPSULE, DELAYED RELEASE ORAL
Qty: 28 CAPSULE | Refills: 0 | Status: SHIPPED | OUTPATIENT
Start: 2024-03-05

## 2024-03-05 RX ORDER — LOSARTAN POTASSIUM 25 MG/1
TABLET ORAL
Qty: 28 TABLET | Refills: 0 | Status: SHIPPED | OUTPATIENT
Start: 2024-03-05

## 2024-03-05 NOTE — TELEPHONE ENCOUNTER
LAST VISIT:   11/28/2023     Future Appointments   Date Time Provider Department Center   3/28/2024 10:45 AM Bjorn Yost MD [facility]       Pharmacy verified? Yes     [facility] [place] - [address] -  [phone] - F [phone]  [address] [place] [address]  Phone: [phone] Fax: [phone]

## 2024-03-06 ENCOUNTER — TELEPHONE (OUTPATIENT)
Dept: PRIMARY CARE CLINIC | Age: 69
End: 2024-03-06

## 2024-03-06 NOTE — TELEPHONE ENCOUNTER
Liam from [address] is reporting patients blood pressure today which was 152/106. Patient had taken her meds prior to have blood pressure done.

## 2024-03-27 ENCOUNTER — TELEPHONE (OUTPATIENT)
Dept: PRIMARY CARE CLINIC | Age: 69
End: 2024-03-27

## 2024-03-27 NOTE — TELEPHONE ENCOUNTER
Pt's daughter calling. Pt hallucinating, states there is dead animals in the home, snakes underneath the house. Asking what you recommend she do? Has appt tomorrow with you. States she has been hallucinating for weeks but this week is the worst it has been

## 2024-03-28 ENCOUNTER — OFFICE VISIT (OUTPATIENT)
Dept: PRIMARY CARE CLINIC | Age: 69
End: 2024-03-28
Payer: COMMERCIAL

## 2024-03-28 VITALS — OXYGEN SATURATION: 96 % | HEART RATE: 83 BPM | DIASTOLIC BLOOD PRESSURE: 76 MMHG | SYSTOLIC BLOOD PRESSURE: 136 MMHG

## 2024-03-28 DIAGNOSIS — R41.0 DELIRIUM: Primary | ICD-10-CM

## 2024-03-28 DIAGNOSIS — I10 ESSENTIAL HYPERTENSION: ICD-10-CM

## 2024-03-28 DIAGNOSIS — E11.9 CONTROLLED TYPE 2 DIABETES MELLITUS WITHOUT COMPLICATION, WITHOUT LONG-TERM CURRENT USE OF INSULIN (HCC): ICD-10-CM

## 2024-03-28 DIAGNOSIS — K21.9 GASTROESOPHAGEAL REFLUX DISEASE WITHOUT ESOPHAGITIS: ICD-10-CM

## 2024-03-28 DIAGNOSIS — R41.0 DELIRIUM: ICD-10-CM

## 2024-03-28 LAB
ALBUMIN SERPL-MCNC: 4 G/DL (ref 3.5–5.2)
ALBUMIN/GLOBULIN RATIO: 1 (ref 1–2.5)
ALP BLD-CCNC: 85 U/L (ref 35–104)
ALT SERPL-CCNC: 16 U/L (ref 10–35)
ANION GAP SERPL CALCULATED.3IONS-SCNC: 11 MMOL/L (ref 9–16)
AST SERPL-CCNC: 17 U/L (ref 10–35)
BASOPHILS ABSOLUTE: 0.03 K/UL (ref 0–0.2)
BASOPHILS RELATIVE PERCENT: 0 % (ref 0–2)
BILIRUB SERPL-MCNC: 0.8 MG/DL (ref 0–1.2)
BILIRUBIN DIRECT: <0.2 MG/DL (ref 0–0.3)
BILIRUBIN, INDIRECT: 0.6 MG/DL (ref 0–1)
BUN BLDV-MCNC: 17 MG/DL (ref 8–23)
CALCIUM SERPL-MCNC: 8.8 MG/DL (ref 8.6–10.4)
CHLORIDE BLD-SCNC: 105 MMOL/L (ref 98–107)
CO2: 27 MMOL/L (ref 20–31)
CREAT SERPL-MCNC: 0.7 MG/DL (ref 0.5–0.9)
EOSINOPHILS ABSOLUTE: 0.08 K/UL (ref 0–0.44)
EOSINOPHILS RELATIVE PERCENT: 1 % (ref 1–4)
GFR SERPL CREATININE-BSD FRML MDRD: >90 ML/MIN/1.73M2
GLOBULIN: 3.2 G/DL
GLUCOSE BLD-MCNC: 112 MG/DL (ref 74–99)
HBA1C MFR BLD: 6.5 %
HCT VFR BLD CALC: 41 % (ref 36.3–47.1)
HEMOGLOBIN: 12.5 G/DL (ref 11.9–15.1)
IMMATURE GRANULOCYTES %: 0 %
IMMATURE GRANULOCYTES ABSOLUTE: <0.03 K/UL (ref 0–0.3)
LYMPHOCYTES ABSOLUTE: 2.7 K/UL (ref 1.1–3.7)
LYMPHOCYTES RELATIVE PERCENT: 30 % (ref 24–43)
MCH RBC QN AUTO: 28.6 PG (ref 25.2–33.5)
MCHC RBC AUTO-ENTMCNC: 30.5 G/DL (ref 28.4–34.8)
MCV RBC AUTO: 93.8 FL (ref 82.6–102.9)
MONOCYTES ABSOLUTE: 0.49 K/UL (ref 0.1–1.2)
MONOCYTES RELATIVE PERCENT: 6 % (ref 3–12)
NEUTROPHILS ABSOLUTE: 5.61 K/UL (ref 1.5–8.1)
NEUTROPHILS RELATIVE PERCENT: 63 % (ref 36–65)
NRBC AUTOMATED: 0 PER 100 WBC
PDW BLD-RTO: 14.2 % (ref 11.8–14.4)
PLATELET # BLD: 257 K/UL (ref 138–453)
PMV BLD AUTO: 11.4 FL (ref 8.1–13.5)
POTASSIUM SERPL-SCNC: 4.3 MMOL/L (ref 3.7–5.3)
RBC # BLD: 4.37 M/UL (ref 3.95–5.11)
SODIUM BLD-SCNC: 143 MMOL/L (ref 136–145)
T4 FREE: 1.1 NG/DL (ref 0.92–1.68)
TOTAL PROTEIN: 7.2 G/DL (ref 6.6–8.7)
TSH SERPL DL<=0.05 MIU/L-ACNC: 2.99 UIU/ML (ref 0.27–4.2)
WBC # BLD: 8.9 K/UL (ref 3.5–11.3)

## 2024-03-28 PROCEDURE — 1123F ACP DISCUSS/DSCN MKR DOCD: CPT | Performed by: FAMILY MEDICINE

## 2024-03-28 PROCEDURE — 83036 HEMOGLOBIN GLYCOSYLATED A1C: CPT | Performed by: FAMILY MEDICINE

## 2024-03-28 PROCEDURE — 3078F DIAST BP <80 MM HG: CPT | Performed by: FAMILY MEDICINE

## 2024-03-28 PROCEDURE — 3044F HG A1C LEVEL LT 7.0%: CPT | Performed by: FAMILY MEDICINE

## 2024-03-28 PROCEDURE — 3075F SYST BP GE 130 - 139MM HG: CPT | Performed by: FAMILY MEDICINE

## 2024-03-28 PROCEDURE — 99214 OFFICE O/P EST MOD 30 MIN: CPT | Performed by: FAMILY MEDICINE

## 2024-03-28 RX ORDER — CYCLOBENZAPRINE HCL 10 MG
10 TABLET ORAL 2 TIMES DAILY
Qty: 180 TABLET | Refills: 3 | Status: SHIPPED | OUTPATIENT
Start: 2024-03-28

## 2024-03-28 RX ORDER — OMEPRAZOLE 20 MG/1
20 CAPSULE, DELAYED RELEASE ORAL DAILY
Qty: 90 CAPSULE | Refills: 1 | Status: SHIPPED | OUTPATIENT
Start: 2024-03-28

## 2024-03-28 RX ORDER — PYRIDOSTIGMINE BROMIDE 60 MG/1
60 TABLET ORAL DAILY
Qty: 90 TABLET | Refills: 1 | Status: SHIPPED | OUTPATIENT
Start: 2024-03-28

## 2024-03-28 RX ORDER — OXYBUTYNIN CHLORIDE 10 MG/1
10 TABLET, EXTENDED RELEASE ORAL DAILY
Qty: 90 TABLET | Refills: 1 | Status: SHIPPED | OUTPATIENT
Start: 2024-03-28

## 2024-03-28 RX ORDER — DULOXETIN HYDROCHLORIDE 60 MG/1
CAPSULE, DELAYED RELEASE ORAL
Qty: 90 CAPSULE | Refills: 1 | Status: SHIPPED | OUTPATIENT
Start: 2024-03-28

## 2024-03-28 RX ORDER — FUROSEMIDE 40 MG/1
TABLET ORAL
Qty: 90 TABLET | Refills: 1 | Status: SHIPPED | OUTPATIENT
Start: 2024-03-28

## 2024-03-28 RX ORDER — LOSARTAN POTASSIUM 25 MG/1
TABLET ORAL
Qty: 90 TABLET | Refills: 1 | Status: SHIPPED | OUTPATIENT
Start: 2024-03-28

## 2024-03-28 RX ORDER — ATORVASTATIN CALCIUM 20 MG/1
TABLET, FILM COATED ORAL
Qty: 90 TABLET | Refills: 1 | Status: SHIPPED | OUTPATIENT
Start: 2024-03-28

## 2024-03-28 RX ORDER — POTASSIUM CHLORIDE 750 MG/1
10 TABLET, EXTENDED RELEASE ORAL DAILY
Qty: 180 TABLET | Refills: 1 | Status: SHIPPED | OUTPATIENT
Start: 2024-03-28

## 2024-03-28 SDOH — ECONOMIC STABILITY: FOOD INSECURITY: WITHIN THE PAST 12 MONTHS, YOU WORRIED THAT YOUR FOOD WOULD RUN OUT BEFORE YOU GOT MONEY TO BUY MORE.: NEVER TRUE

## 2024-03-28 SDOH — ECONOMIC STABILITY: FOOD INSECURITY: WITHIN THE PAST 12 MONTHS, THE FOOD YOU BOUGHT JUST DIDN'T LAST AND YOU DIDN'T HAVE MONEY TO GET MORE.: NEVER TRUE

## 2024-03-28 SDOH — ECONOMIC STABILITY: INCOME INSECURITY: HOW HARD IS IT FOR YOU TO PAY FOR THE VERY BASICS LIKE FOOD, HOUSING, MEDICAL CARE, AND HEATING?: NOT HARD AT ALL

## 2024-03-28 ASSESSMENT — PATIENT HEALTH QUESTIONNAIRE - PHQ9
SUM OF ALL RESPONSES TO PHQ QUESTIONS 1-9: 0
SUM OF ALL RESPONSES TO PHQ QUESTIONS 1-9: 0
1. LITTLE INTEREST OR PLEASURE IN DOING THINGS: NOT AT ALL
SUM OF ALL RESPONSES TO PHQ QUESTIONS 1-9: 0
SUM OF ALL RESPONSES TO PHQ QUESTIONS 1-9: 0
2. FEELING DOWN, DEPRESSED OR HOPELESS: NOT AT ALL
SUM OF ALL RESPONSES TO PHQ9 QUESTIONS 1 & 2: 0

## 2024-03-28 ASSESSMENT — ENCOUNTER SYMPTOMS
RHINORRHEA: 0
NAUSEA: 0
COUGH: 0
SORE THROAT: 0
DIARRHEA: 0
ABDOMINAL PAIN: 0
EYE DISCHARGE: 0
EYE REDNESS: 0
WHEEZING: 0
VOMITING: 0
SHORTNESS OF BREATH: 0

## 2024-03-28 NOTE — PROGRESS NOTES
[facility]  [facility] PRIMARY CARE  [address] [facility] [address]  Dept: [phone]    Claire Rubin is a 69 y.o. female Established patient, who presents today for her medical conditions/complaints as noted below.      Chief Complaint   Patient presents with    Diabetes    halucinations       HPI:     HPI  Pt stopped percocet back in November.  Has been seeing snakes the past week.  No fever or chills.  No changes in past couple weeks.  No dysuria.  Patient denies any previous episodes.  Denies any changes of medications.  Patient denies any lethargy.  No street drugs.  Patient not checking blood sugars on a regular basis.  Patient states to be tried before to get colonoscopy done but has troubles with transportation.  Patient also cannot do Cologuard.  Understands risk of colon cancer and death    Reviewed prior notes None  Reviewed previous Labs    LDL Cholesterol (mg/dL)   Date Value   11/28/2023 125   10/19/2021 117     LDL Calculated (mg/dL)   Date Value   03/27/2023 115   02/24/2020 117   12/31/2018 75       (goal LDL is <100)   AST (U/L)   Date Value   11/28/2023 15     ALT (U/L)   Date Value   11/28/2023 14     BUN (mg/dL)   Date Value   11/28/2023 19     Hemoglobin A1C (%)   Date Value   03/28/2024 6.5     TSH (uIU/mL)   Date Value   11/28/2023 3.22     BP Readings from Last 3 Encounters:   03/28/24 136/76   11/28/23 (!) 136/102   07/24/23 (!) 122/100          (goal 120/80)    Past Medical History:   Diagnosis Date    Acute transverse myelitis in demyelinating disease of central nervous system (HCC)     Atrial fibrillation (HCC)     Depression     Hyperlipidemia     Hypertension       Past Surgical History:   Procedure Laterality Date    APPENDECTOMY  1988    CHOLECYSTECTOMY, OPEN  1984    FEMUR FRACTURE SURGERY Left 05/2018    HYSTERECTOMY, TOTAL ABDOMINAL (CERVIX REMOVED)  1988    OVARY REMOVAL         No family history on file.    Social History     Tobacco Use

## 2024-04-02 ENCOUNTER — TELEPHONE (OUTPATIENT)
Dept: PRIMARY CARE CLINIC | Age: 69
End: 2024-04-02

## 2024-04-02 NOTE — TELEPHONE ENCOUNTER
Liam from [address] is reporting patients blood pressure reading today which was 185/108 HR 75, she states patient is not symptomatic.

## 2024-05-07 ENCOUNTER — OFFICE VISIT (OUTPATIENT)
Dept: PRIMARY CARE CLINIC | Age: 69
End: 2024-05-07
Payer: COMMERCIAL

## 2024-05-07 VITALS — DIASTOLIC BLOOD PRESSURE: 68 MMHG | OXYGEN SATURATION: 96 % | SYSTOLIC BLOOD PRESSURE: 128 MMHG | HEART RATE: 77 BPM

## 2024-05-07 DIAGNOSIS — L03.115 CELLULITIS OF RIGHT LOWER EXTREMITY: ICD-10-CM

## 2024-05-07 DIAGNOSIS — R44.3 HALLUCINATIONS: ICD-10-CM

## 2024-05-07 DIAGNOSIS — G37.3: ICD-10-CM

## 2024-05-07 DIAGNOSIS — E11.9 CONTROLLED TYPE 2 DIABETES MELLITUS WITHOUT COMPLICATION, WITHOUT LONG-TERM CURRENT USE OF INSULIN (HCC): Primary | ICD-10-CM

## 2024-05-07 PROCEDURE — 3078F DIAST BP <80 MM HG: CPT | Performed by: FAMILY MEDICINE

## 2024-05-07 PROCEDURE — 99214 OFFICE O/P EST MOD 30 MIN: CPT | Performed by: FAMILY MEDICINE

## 2024-05-07 PROCEDURE — 3074F SYST BP LT 130 MM HG: CPT | Performed by: FAMILY MEDICINE

## 2024-05-07 PROCEDURE — 3044F HG A1C LEVEL LT 7.0%: CPT | Performed by: FAMILY MEDICINE

## 2024-05-07 PROCEDURE — 1123F ACP DISCUSS/DSCN MKR DOCD: CPT | Performed by: FAMILY MEDICINE

## 2024-05-07 RX ORDER — DOXYCYCLINE HYCLATE 100 MG
100 TABLET ORAL 2 TIMES DAILY
Qty: 20 TABLET | Refills: 0 | Status: SHIPPED | OUTPATIENT
Start: 2024-05-07 | End: 2024-05-17

## 2024-05-07 RX ORDER — ARIPIPRAZOLE 5 MG/1
5 TABLET ORAL DAILY
Qty: 90 TABLET | Refills: 3 | Status: SHIPPED | OUTPATIENT
Start: 2024-05-07

## 2024-05-07 RX ORDER — DULOXETIN HYDROCHLORIDE 30 MG/1
30 CAPSULE, DELAYED RELEASE ORAL DAILY
Qty: 90 CAPSULE | Refills: 3 | Status: SHIPPED | OUTPATIENT
Start: 2024-05-07

## 2024-05-07 ASSESSMENT — ENCOUNTER SYMPTOMS
SHORTNESS OF BREATH: 0
SORE THROAT: 0
VOMITING: 0
ABDOMINAL PAIN: 0
EYE DISCHARGE: 0
DIARRHEA: 0
COUGH: 0
NAUSEA: 0
WHEEZING: 0
EYE REDNESS: 0
RHINORRHEA: 0

## 2024-05-07 NOTE — PROGRESS NOTES
[facility]  [facility] PRIMARY CARE  [address]  [facility] [address]  Dept: [phone]    Claire Rubin is a 69 y.o. female Established patient, who presents today for her medical conditions/complaints as noted below.      Chief Complaint   Patient presents with    Hallucinations       HPI:     HPI  Patient continues to have visual hallucinations.  Patient sees dead bodies.  Dead animals.  Snakes.  Clowns with mean faces.  Denies any auditory hallucinations.  States most the time she knows that they are not real but sometimes believes they are.  States her mood is been doing well.  Denies feeling down or sad.  Not tearful.  Hallucinations did not start after any change in medication.  Blood work and urine culture were reviewed and were unremarkable.  Patient states in good spirits but just tired of seeing visual hallucinations.    Reviewed prior notes None  Reviewed previous Labs and Imaging    No components found for: \"LDLCHOLESTEROL\", \"LDLCALC\"    (goal LDL is <100)   AST (U/L)   Date Value   03/28/2024 17     ALT (U/L)   Date Value   03/28/2024 16     BUN (mg/dL)   Date Value   03/28/2024 17     Hemoglobin A1C (%)   Date Value   03/28/2024 6.5     TSH (uIU/mL)   Date Value   03/28/2024 2.99     BP Readings from Last 3 Encounters:   05/07/24 128/68   03/28/24 136/76   11/28/23 (!) 136/102          (goal 120/80)    Past Medical History:   Diagnosis Date    Acute transverse myelitis in demyelinating disease of central nervous system (HCC)     Atrial fibrillation (HCC)     Depression     Hyperlipidemia     Hypertension       Past Surgical History:   Procedure Laterality Date    APPENDECTOMY  1988    CHOLECYSTECTOMY, OPEN  1984    FEMUR FRACTURE SURGERY Left 05/2018    HYSTERECTOMY, TOTAL ABDOMINAL (CERVIX REMOVED)  1988    OVARY REMOVAL         History reviewed. No pertinent family history.    Social History     Tobacco Use    Smoking status: Never    Smokeless tobacco: Never

## 2024-05-15 ENCOUNTER — APPOINTMENT (OUTPATIENT)
Dept: CT IMAGING | Age: 69
End: 2024-05-15
Payer: COMMERCIAL

## 2024-05-15 ENCOUNTER — HOSPITAL ENCOUNTER (EMERGENCY)
Age: 69
Discharge: HOME | End: 2024-05-15
Attending: EMERGENCY MEDICINE
Payer: COMMERCIAL

## 2024-05-15 ENCOUNTER — TELEPHONE (OUTPATIENT)
Dept: PRIMARY CARE CLINIC | Age: 69
End: 2024-05-15

## 2024-05-15 VITALS
RESPIRATION RATE: 20 BRPM | TEMPERATURE: 97.5 F | WEIGHT: 210 LBS | OXYGEN SATURATION: 95 % | DIASTOLIC BLOOD PRESSURE: 93 MMHG | BODY MASS INDEX: 37.2 KG/M2 | HEART RATE: 72 BPM | SYSTOLIC BLOOD PRESSURE: 170 MMHG

## 2024-05-15 DIAGNOSIS — I10 HYPERTENSION, UNSPECIFIED TYPE: Primary | ICD-10-CM

## 2024-05-15 DIAGNOSIS — R44.1 VISUAL HALLUCINATIONS: ICD-10-CM

## 2024-05-15 PROCEDURE — 70450 CT HEAD/BRAIN W/O DYE: CPT

## 2024-05-15 PROCEDURE — 99284 EMERGENCY DEPT VISIT MOD MDM: CPT

## 2024-05-15 PROCEDURE — 93005 ELECTROCARDIOGRAM TRACING: CPT | Performed by: EMERGENCY MEDICINE

## 2024-05-15 PROCEDURE — 6370000000 HC RX 637 (ALT 250 FOR IP): Performed by: EMERGENCY MEDICINE

## 2024-05-15 RX ORDER — METOPROLOL TARTRATE 50 MG/1
25 TABLET, FILM COATED ORAL ONCE
Status: COMPLETED | OUTPATIENT
Start: 2024-05-15 | End: 2024-05-15

## 2024-05-15 RX ORDER — LOSARTAN POTASSIUM 100 MG/1
100 TABLET ORAL DAILY
Qty: 30 TABLET | Refills: 0 | Status: SHIPPED | OUTPATIENT
Start: 2024-05-15

## 2024-05-15 RX ADMIN — METOPROLOL TARTRATE 25 MG: 50 TABLET, FILM COATED ORAL at 13:28

## 2024-05-15 ASSESSMENT — ENCOUNTER SYMPTOMS
SHORTNESS OF BREATH: 0
ABDOMINAL PAIN: 0

## 2024-05-15 NOTE — DISCHARGE INSTRUCTIONS
You were seen here for high blood pressure.  We did speak with your primary physician Dr. Yost to discuss what changes he would like made to your home medications.  He requested that your losartan dose be increased from 25 mg to 100 mg.  You were given a new prescription for losartan 100 mg.  Starting tomorrow, you need to take the 100 mg losartan and not the 25 mg.  Continue taking your other medications as prescribed.    You need to call and schedule follow-up appointment with your PCP for soon as possible.    Return to the emergency department immediately if you experience worsening symptoms, develop any other symptoms, or if you have any other concerns.

## 2024-05-15 NOTE — ED NOTES
[place] 8pm; [facility] unavailable; Antoni Abbott 8pm; [facility] doesn't take her insurance; Sobeida COSTELLO 6:15-6:30pm.

## 2024-05-15 NOTE — ED NOTES
Pt to ED via EMS after her home health aid called 911 due to high BP with systolic of 200's. Pt BP was 160's for EMS, and EKG showed a-fib. Pt has hx of a-fib and is taking xarelto for it. Pt denies any symptoms and feels herself. Pt neurological assessment WNL for RN. Pt has some right sided weakness but she states that is her baseline. Pt is on monitor with call light.

## 2024-05-15 NOTE — ED PROVIDER NOTES
tablet Take 1 tablet by mouth daily 5/7/24   Bjorn Yost MD   doxycycline hyclate (VIBRA-TABS) 100 MG tablet Take 1 tablet by mouth 2 times daily for 10 days 5/7/24 5/17/24  Bjorn Yost MD   oxyBUTYnin (DITROPAN-XL) 10 MG extended release tablet Take 1 tablet by mouth daily 3/28/24   Bjorn Yost MD   cyclobenzaprine (FLEXERIL) 10 MG tablet Take 1 tablet by mouth 2 times daily 3/28/24   Bjorn Yost MD   rivaroxaban (XARELTO) 20 MG TABS tablet TAKE 1 TABLET IN THE MORNING WITH BREAKFAST 3/28/24   Bjorn Yost MD   omeprazole (PRILOSEC) 20 MG delayed release capsule Take 1 capsule by mouth Daily 3/28/24   Bjorn Yost MD   potassium chloride (KLOR-CON M10) 10 MEQ extended release tablet Take 1 tablet by mouth daily 3/28/24   Bjorn Yost MD   pyRIDostigmine (MESTINON) 60 MG tablet Take 1 tablet by mouth daily 3/28/24   Bjorn Yost MD   metoprolol tartrate (LOPRESSOR) 25 MG tablet Take 1 tablet by mouth 2 times daily 3/28/24   Bjorn Yost MD   furosemide (LASIX) 40 MG tablet TAKE (1) TABLET EVERY MORNING 3/28/24   Bjorn Yost MD   atorvastatin (LIPITOR) 20 MG tablet TAKE (1) TABLET IN THE EVENING. 3/28/24   Bjorn Yost MD   metFORMIN (GLUCOPHAGE) 500 MG tablet Take 1 tablet by mouth daily (with breakfast) 3/28/24   Bjorn Yost MD   RA HEMORRHOIDAL 1-0.25-14.4-15 % CREA rectal cream apply TO RECTUM AREA once daily if needed for HEMMORROIDS 12/4/17   Provider, Historical, MD   TRUEPLUS LANCETS 30G MISC use as directed TESTING ONCE A WEEK 12/5/17   Laly Ruiz MD   TRUE METRIX BLOOD GLUCOSE TEST strip TEST ONCE A WEEK 12/5/17   Laly Ruiz MD   RA ALLERGY 25 MG tablet take 1 tablet by mouth three times a day if needed for itching 12/4/17   Laly Ruiz MD   Blood Glucose Monitoring Suppl (TRUE METRIX METER) w/Device KIT use as directed ONCE A WEEK 12/5/17   Provider, MD Laly   RA  has been having hallucinations of dead animals and snakes eating the dead animals.  Patient states that she knows those visions are not real however patient states that they are scary.  Her PCP ordered an outpatient MRI of brain and referred her to neurology.  Patient is awaiting follow-up with neurology for her hallucinations.  Patient also currently on doxycycline for right lower limb cellulitis which she states is improving.    On evaluation, patient is well-appearing, nontoxic, afebrile.  Lung sounds are clear and equal bilaterally, abdomen soft nontender.  Neurological exam was performed which showed right-sided weakness which is her baseline.  Discussed with patient that we will get a CT head given her hallucinations.  Will speak with her family physician in regards to how he would like the hypertension treated.  Patient is agreeable with this plan.    Amount and/or Complexity of Data Reviewed  Radiology: ordered.  ECG/medicine tests: ordered.    Risk  Prescription drug management.      EMERGENCY DEPARTMENT COURSE:    ED Course as of 05/15/24 1657   Wed May 15, 2024   1314 Spoke with patient's family physician Dr. Yost regarding her hypertension.  Per Dr. Yost, he would like her home dose of metoprolol 25 mg given in the emergency department and then to increase her home dose of Cozaar from 25 mg to 100 mg. [SD]   1323 EKG obtained prior to physician evaluation per department protocols.   Based on evaluation we would never have ordered this EKG it is not indicated based on this patient's complaint however review shows that is unchanged from the 10/8/2020 EKG of the patient's [WK]   1444 CT head  IMPRESSION:  No acute intracranial abnormality.      [WK]   1503 Reassessed patient, discussed imaging results with her.  Blood pressure is improving after receiving the metoprolol.  Patient was given a prescription for losartan 100 mg as recommended by her primary care physician.  Patient was advised to call PCP

## 2024-05-15 NOTE — TELEPHONE ENCOUNTER
Laverne from [facility] calling to report that pt's BP today is running in the 200\"s. BP was checked 6 times and the lowest was 188/108. Most are over 200. Pulse 75, pulse ox 97. Pt states that she feels fine and took all her meds as prescribed. Laverne told her to go to the ER, but pt declines. Asking what do you recommend?    [phone]

## 2024-05-15 NOTE — TELEPHONE ENCOUNTER
Laverne notified. Stated that she had the squad take pt to the ER. Thinks they are taking her to [facility].

## 2024-05-17 LAB
EKG ATRIAL RATE: 79 BPM
EKG P AXIS: 82 DEGREES
EKG P-R INTERVAL: 162 MS
EKG Q-T INTERVAL: 414 MS
EKG QRS DURATION: 102 MS
EKG QTC CALCULATION (BAZETT): 474 MS
EKG R AXIS: -15 DEGREES
EKG T AXIS: 31 DEGREES
EKG VENTRICULAR RATE: 79 BPM

## 2024-06-03 RX ORDER — LOSARTAN POTASSIUM 100 MG/1
100 TABLET ORAL DAILY
Qty: 90 TABLET | Refills: 3 | Status: SHIPPED | OUTPATIENT
Start: 2024-06-03

## 2024-06-03 NOTE — TELEPHONE ENCOUNTER
Per the pharmacy patient stated her losartan was increased to 100 mg daily while she was in the hospital last. Pharmacy is asking for anew script to be sent with the increase if PCP wants to continue that dose.

## 2024-07-01 ENCOUNTER — OFFICE VISIT (OUTPATIENT)
Dept: PRIMARY CARE CLINIC | Age: 69
End: 2024-07-01
Payer: COMMERCIAL

## 2024-07-01 VITALS — OXYGEN SATURATION: 94 % | SYSTOLIC BLOOD PRESSURE: 176 MMHG | DIASTOLIC BLOOD PRESSURE: 90 MMHG | HEART RATE: 92 BPM

## 2024-07-01 DIAGNOSIS — Z00.00 MEDICARE ANNUAL WELLNESS VISIT, SUBSEQUENT: Primary | ICD-10-CM

## 2024-07-01 DIAGNOSIS — E11.9 CONTROLLED TYPE 2 DIABETES MELLITUS WITHOUT COMPLICATION, WITHOUT LONG-TERM CURRENT USE OF INSULIN (HCC): ICD-10-CM

## 2024-07-01 DIAGNOSIS — R90.89 ABNORMAL FINDING ON MRI OF BRAIN: ICD-10-CM

## 2024-07-01 DIAGNOSIS — R44.3 HALLUCINATIONS: ICD-10-CM

## 2024-07-01 LAB — HBA1C MFR BLD: 8 %

## 2024-07-01 PROCEDURE — 3077F SYST BP >= 140 MM HG: CPT | Performed by: FAMILY MEDICINE

## 2024-07-01 PROCEDURE — 1123F ACP DISCUSS/DSCN MKR DOCD: CPT | Performed by: FAMILY MEDICINE

## 2024-07-01 PROCEDURE — G0439 PPPS, SUBSEQ VISIT: HCPCS | Performed by: FAMILY MEDICINE

## 2024-07-01 PROCEDURE — 83036 HEMOGLOBIN GLYCOSYLATED A1C: CPT | Performed by: FAMILY MEDICINE

## 2024-07-01 PROCEDURE — 3052F HG A1C>EQUAL 8.0%<EQUAL 9.0%: CPT | Performed by: FAMILY MEDICINE

## 2024-07-01 PROCEDURE — 3080F DIAST BP >= 90 MM HG: CPT | Performed by: FAMILY MEDICINE

## 2024-07-01 RX ORDER — AMLODIPINE BESYLATE 5 MG/1
5 TABLET ORAL DAILY
Qty: 90 TABLET | Refills: 3 | Status: SHIPPED | OUTPATIENT
Start: 2024-07-01

## 2024-07-01 ASSESSMENT — PATIENT HEALTH QUESTIONNAIRE - PHQ9
SUM OF ALL RESPONSES TO PHQ QUESTIONS 1-9: 2
SUM OF ALL RESPONSES TO PHQ9 QUESTIONS 1 & 2: 2
SUM OF ALL RESPONSES TO PHQ QUESTIONS 1-9: 2
1. LITTLE INTEREST OR PLEASURE IN DOING THINGS: SEVERAL DAYS
2. FEELING DOWN, DEPRESSED OR HOPELESS: SEVERAL DAYS

## 2024-07-01 NOTE — PROGRESS NOTES
Medicare Annual Wellness Visit    Claire Auner is here for Medicare AWV and Diabetes    Assessment & Plan   Medicare annual wellness visit, subsequent  Controlled type 2 diabetes mellitus without complication, without long-term current use of insulin (HCC)  -     POCT glycosylated hemoglobin (Hb A1C)  Hallucinations  Abnormal finding on MRI of brain  -     MRI BRAIN WO CONTRAST; Future  Recommendations for Preventive Services Due: see orders and patient instructions/AVS.  Recommended screening schedule for the next 5-10 years is provided to the patient in written form: see Patient Instructions/AVS.     Return in about 4 months (around 11/1/2024).     Subjective   The following acute and/or chronic problems were also addressed today:  NIDDM  Lymphedema  Hallucinations    Patient's complete Health Risk Assessment and screening values have been reviewed and are found in Flowsheets. The following problems were reviewed today and where indicated follow up appointments were made and/or referrals ordered.    Positive Risk Factor Screenings with Interventions:               General HRA Questions:  Select all that apply: (!) New or Increased Pain, New or Increased Fatigue, Loneliness, Stress, Anger    Pain Interventions:  Patient declined any further interventions or treatment    Fatigue Interventions:  Patient declined any further interventions or treatment    Loneliness Interventions:  Patient declined any further interventions or treatment    Stress Interventions:  Patient declined any further interventions or treatment    Anger Interventions:  Patient declined any further interventions or treatment      Activity, Diet, and Weight:  On average, how many days per week do you engage in moderate to strenuous exercise (like a brisk walk)?: 0 days  On average, how many minutes do you engage in exercise at this level?: 0 min    Do you eat balanced/healthy meals regularly?: Yes    There is no height or weight on file to

## 2024-07-03 ENCOUNTER — TELEPHONE (OUTPATIENT)
Dept: PRIMARY CARE CLINIC | Age: 69
End: 2024-07-03

## 2024-07-03 DIAGNOSIS — F41.9 ANXIETY: Primary | ICD-10-CM

## 2024-07-03 RX ORDER — DIAZEPAM 5 MG/1
5 TABLET ORAL 2 TIMES DAILY PRN
Qty: 2 TABLET | Refills: 0 | Status: SHIPPED | OUTPATIENT
Start: 2024-07-03 | End: 2024-07-06

## 2024-07-03 NOTE — TELEPHONE ENCOUNTER
Patient scheduled for an MRI 7/5 at 10:30 am and is asking for something for claustrophobia. [facility] (still open)

## 2024-07-03 NOTE — TELEPHONE ENCOUNTER
Diazepam 5 mg tablet sent to pharmacy.  Take diazepam 1 hour before MRI and may repeat x 1 dose if necessary.

## 2024-07-05 ENCOUNTER — HOSPITAL ENCOUNTER (OUTPATIENT)
Dept: MRI IMAGING | Age: 69
End: 2024-07-05
Attending: FAMILY MEDICINE
Payer: COMMERCIAL

## 2024-07-05 DIAGNOSIS — R90.89 ABNORMAL FINDING ON MRI OF BRAIN: ICD-10-CM

## 2024-07-05 PROCEDURE — 70551 MRI BRAIN STEM W/O DYE: CPT

## 2024-08-13 ENCOUNTER — OFFICE VISIT (OUTPATIENT)
Dept: PRIMARY CARE CLINIC | Age: 69
End: 2024-08-13
Payer: COMMERCIAL

## 2024-08-13 VITALS — OXYGEN SATURATION: 97 % | SYSTOLIC BLOOD PRESSURE: 138 MMHG | HEART RATE: 81 BPM | DIASTOLIC BLOOD PRESSURE: 88 MMHG

## 2024-08-13 DIAGNOSIS — I10 ESSENTIAL HYPERTENSION: ICD-10-CM

## 2024-08-13 DIAGNOSIS — R44.3 HALLUCINATIONS: ICD-10-CM

## 2024-08-13 DIAGNOSIS — E11.9 CONTROLLED TYPE 2 DIABETES MELLITUS WITHOUT COMPLICATION, WITHOUT LONG-TERM CURRENT USE OF INSULIN (HCC): Primary | ICD-10-CM

## 2024-08-13 DIAGNOSIS — I48.20 CHRONIC ATRIAL FIBRILLATION (HCC): ICD-10-CM

## 2024-08-13 LAB
ANION GAP SERPL CALCULATED.3IONS-SCNC: 9 MMOL/L (ref 9–16)
BUN BLDV-MCNC: 15 MG/DL (ref 8–23)
CALCIUM SERPL-MCNC: 9 MG/DL (ref 8.6–10.4)
CHLORIDE BLD-SCNC: 108 MMOL/L (ref 98–107)
CO2: 28 MMOL/L (ref 20–31)
CREAT SERPL-MCNC: 0.7 MG/DL (ref 0.5–0.9)
GFR, ESTIMATED: >90 ML/MIN/1.73M2
GLUCOSE BLD-MCNC: 127 MG/DL (ref 74–99)
POTASSIUM SERPL-SCNC: 4 MMOL/L (ref 3.7–5.3)
SODIUM BLD-SCNC: 145 MMOL/L (ref 136–145)
VIT B12 SERPL-MCNC: 454 PG/ML (ref 232–1245)

## 2024-08-13 PROCEDURE — 3075F SYST BP GE 130 - 139MM HG: CPT | Performed by: FAMILY MEDICINE

## 2024-08-13 PROCEDURE — 3079F DIAST BP 80-89 MM HG: CPT | Performed by: FAMILY MEDICINE

## 2024-08-13 PROCEDURE — 3052F HG A1C>EQUAL 8.0%<EQUAL 9.0%: CPT | Performed by: FAMILY MEDICINE

## 2024-08-13 PROCEDURE — 99213 OFFICE O/P EST LOW 20 MIN: CPT | Performed by: FAMILY MEDICINE

## 2024-08-13 PROCEDURE — 1123F ACP DISCUSS/DSCN MKR DOCD: CPT | Performed by: FAMILY MEDICINE

## 2024-08-13 RX ORDER — QUETIAPINE FUMARATE 50 MG/1
50 TABLET, FILM COATED ORAL NIGHTLY
Qty: 90 TABLET | Refills: 3
Start: 2024-08-13

## 2024-08-13 ASSESSMENT — ENCOUNTER SYMPTOMS
EYE REDNESS: 0
NAUSEA: 0
COUGH: 0
ABDOMINAL PAIN: 0
SORE THROAT: 0
WHEEZING: 0
EYE DISCHARGE: 0
RHINORRHEA: 0
DIARRHEA: 0
SHORTNESS OF BREATH: 0
VOMITING: 0

## 2024-08-13 NOTE — PROGRESS NOTES
answered. Pt voiced understanding.Reviewed health maintenance.  Instructed to continue current medications, diet andexercise.  Patient agreed with treatment plan. Follow up as directed.     Electronicallysigned by Bjorn Yost MD on 8/13/2024 at 10:59 AM

## 2024-09-27 DIAGNOSIS — K21.9 GASTROESOPHAGEAL REFLUX DISEASE WITHOUT ESOPHAGITIS: ICD-10-CM

## 2024-09-27 DIAGNOSIS — E11.9 CONTROLLED TYPE 2 DIABETES MELLITUS WITHOUT COMPLICATION, WITHOUT LONG-TERM CURRENT USE OF INSULIN (HCC): ICD-10-CM

## 2024-09-27 RX ORDER — PYRIDOSTIGMINE BROMIDE 60 MG/1
TABLET ORAL
Qty: 28 TABLET | Refills: 0 | Status: SHIPPED | OUTPATIENT
Start: 2024-09-27

## 2024-09-27 RX ORDER — POTASSIUM CHLORIDE 750 MG/1
TABLET, EXTENDED RELEASE ORAL
Qty: 28 TABLET | Refills: 0 | Status: SHIPPED | OUTPATIENT
Start: 2024-09-27

## 2024-09-27 RX ORDER — FUROSEMIDE 40 MG
TABLET ORAL
Qty: 28 TABLET | Refills: 0 | Status: SHIPPED | OUTPATIENT
Start: 2024-09-27

## 2024-09-27 RX ORDER — METOPROLOL TARTRATE 25 MG/1
TABLET, FILM COATED ORAL
Qty: 56 TABLET | Refills: 0 | Status: SHIPPED | OUTPATIENT
Start: 2024-09-27

## 2024-09-27 RX ORDER — ATORVASTATIN CALCIUM 20 MG/1
TABLET, FILM COATED ORAL
Qty: 28 TABLET | Refills: 0 | Status: SHIPPED | OUTPATIENT
Start: 2024-09-27

## 2024-09-30 RX ORDER — OXYBUTYNIN CHLORIDE 10 MG/1
10 TABLET, EXTENDED RELEASE ORAL DAILY
Qty: 90 TABLET | Refills: 1 | Status: SHIPPED | OUTPATIENT
Start: 2024-09-30

## 2024-10-24 DIAGNOSIS — K21.9 GASTROESOPHAGEAL REFLUX DISEASE WITHOUT ESOPHAGITIS: ICD-10-CM

## 2024-10-24 DIAGNOSIS — E11.9 CONTROLLED TYPE 2 DIABETES MELLITUS WITHOUT COMPLICATION, WITHOUT LONG-TERM CURRENT USE OF INSULIN (HCC): ICD-10-CM

## 2024-10-24 RX ORDER — FUROSEMIDE 40 MG/1
TABLET ORAL
Qty: 28 TABLET | Refills: 0 | Status: SHIPPED | OUTPATIENT
Start: 2024-10-24

## 2024-10-24 RX ORDER — POTASSIUM CHLORIDE 750 MG/1
TABLET, EXTENDED RELEASE ORAL
Qty: 28 TABLET | Refills: 0 | Status: SHIPPED | OUTPATIENT
Start: 2024-10-24

## 2024-10-24 RX ORDER — PYRIDOSTIGMINE BROMIDE 60 MG/1
TABLET ORAL
Qty: 28 TABLET | Refills: 0 | Status: SHIPPED | OUTPATIENT
Start: 2024-10-24

## 2024-10-24 RX ORDER — ATORVASTATIN CALCIUM 20 MG/1
TABLET, FILM COATED ORAL
Qty: 28 TABLET | Refills: 0 | Status: SHIPPED | OUTPATIENT
Start: 2024-10-24

## 2024-10-25 RX ORDER — METOPROLOL TARTRATE 25 MG/1
25 TABLET, FILM COATED ORAL 2 TIMES DAILY
Qty: 56 TABLET | Refills: 0 | Status: SHIPPED | OUTPATIENT
Start: 2024-10-25

## 2024-11-12 ENCOUNTER — OFFICE VISIT (OUTPATIENT)
Dept: PRIMARY CARE CLINIC | Age: 69
End: 2024-11-12

## 2024-11-12 VITALS
HEIGHT: 63 IN | OXYGEN SATURATION: 95 % | DIASTOLIC BLOOD PRESSURE: 82 MMHG | SYSTOLIC BLOOD PRESSURE: 134 MMHG | HEART RATE: 79 BPM | BODY MASS INDEX: 37.2 KG/M2

## 2024-11-12 DIAGNOSIS — Z13.220 ENCOUNTER FOR LIPID SCREENING FOR CARDIOVASCULAR DISEASE: ICD-10-CM

## 2024-11-12 DIAGNOSIS — Z00.00 ANNUAL PHYSICAL EXAM: ICD-10-CM

## 2024-11-12 DIAGNOSIS — Z79.01 ON CONTINUOUS ORAL ANTICOAGULATION: ICD-10-CM

## 2024-11-12 DIAGNOSIS — Z13.6 ENCOUNTER FOR LIPID SCREENING FOR CARDIOVASCULAR DISEASE: ICD-10-CM

## 2024-11-12 DIAGNOSIS — E11.9 CONTROLLED TYPE 2 DIABETES MELLITUS WITHOUT COMPLICATION, WITHOUT LONG-TERM CURRENT USE OF INSULIN (HCC): Primary | ICD-10-CM

## 2024-11-12 DIAGNOSIS — Z23 NEED FOR VIRAL IMMUNIZATION: ICD-10-CM

## 2024-11-12 LAB — HBA1C MFR BLD: 8.1 %

## 2024-11-12 RX ORDER — DAPAGLIFLOZIN 10 MG/1
10 TABLET, FILM COATED ORAL EVERY MORNING
Qty: 90 TABLET | Refills: 3 | Status: SHIPPED | OUTPATIENT
Start: 2024-11-12

## 2024-11-12 ASSESSMENT — ENCOUNTER SYMPTOMS
EYE DISCHARGE: 0
EYE REDNESS: 0
SORE THROAT: 0
NAUSEA: 0
WHEEZING: 0
COUGH: 0
SHORTNESS OF BREATH: 0
RHINORRHEA: 0
ABDOMINAL PAIN: 0
DIARRHEA: 0
VOMITING: 0

## 2024-11-12 NOTE — PROGRESS NOTES
distress.     Appearance: She is well-developed. She is not ill-appearing.   HENT:      Head: Normocephalic and atraumatic.      Right Ear: External ear normal.      Left Ear: External ear normal.   Eyes:      General: No scleral icterus.        Right eye: No discharge.         Left eye: No discharge.      Conjunctiva/sclera: Conjunctivae normal.   Neck:      Thyroid: No thyromegaly.      Trachea: No tracheal deviation.   Cardiovascular:      Rate and Rhythm: Normal rate and regular rhythm.      Heart sounds: Normal heart sounds.   Pulmonary:      Effort: Pulmonary effort is normal. No respiratory distress.      Breath sounds: Normal breath sounds. No wheezing.   Lymphadenopathy:      Cervical: No cervical adenopathy.   Skin:     General: Skin is warm.      Findings: No rash.   Neurological:      Mental Status: She is alert and oriented to person, place, and time.   Psychiatric:         Mood and Affect: Mood normal.         Behavior: Behavior normal.         Thought Content: Thought content normal.         Assessment:       Diagnosis Orders   1. Controlled type 2 diabetes mellitus without complication, without long-term current use of insulin ([facility])  POCT glycosylated hemoglobin (Hb A1C)    FARXIGA 10 MG tablet    Microalbumin, Ur      2. Need for viral immunization  Influenza, FLUAD Trivalent, (age 65 y+), IM, Preservative Free, 0.5mL      3. Encounter for lipid screening for cardiovascular disease  Lipid, Fasting      4. Annual physical exam  Basic Metabolic Panel, Fasting    Hepatic Function Panel      5. On continuous oral anticoagulation  CBC with Auto Differential           Plan:   Assessment & Plan   Flu shot today  Blood work ordered in December  Urine for microalbumin  Add Farxiga 10 mg daily for diabetes    Return in about 4 months (around 3/12/2025) for DIABETES.    Orders Placed This Encounter   Procedures    Influenza, FLUAD Trivalent, (age 65 y+), IM, Preservative Free, 0.5mL    Microalbumin, Ur

## 2024-11-15 ENCOUNTER — TELEPHONE (OUTPATIENT)
Dept: PRIMARY CARE CLINIC | Age: 69
End: 2024-11-15

## 2024-11-15 NOTE — TELEPHONE ENCOUNTER
Yes we did receive forms and  did sign them. Forms were faxed today. Patient was called and notified

## 2024-11-15 NOTE — TELEPHONE ENCOUNTER
Patient called asking if Jose sent an order for an electric chair and that we received it. She stated it was faxed on the 12th.     Please advise.

## 2024-11-21 DIAGNOSIS — K21.9 GASTROESOPHAGEAL REFLUX DISEASE WITHOUT ESOPHAGITIS: ICD-10-CM

## 2024-11-21 DIAGNOSIS — E11.9 CONTROLLED TYPE 2 DIABETES MELLITUS WITHOUT COMPLICATION, WITHOUT LONG-TERM CURRENT USE OF INSULIN (HCC): ICD-10-CM

## 2024-11-22 RX ORDER — ATORVASTATIN CALCIUM 20 MG/1
TABLET, FILM COATED ORAL
Qty: 28 TABLET | Refills: 0 | Status: SHIPPED | OUTPATIENT
Start: 2024-11-22

## 2024-11-22 RX ORDER — METOPROLOL TARTRATE 25 MG/1
TABLET, FILM COATED ORAL
Qty: 56 TABLET | Refills: 0 | Status: SHIPPED | OUTPATIENT
Start: 2024-11-22

## 2024-11-22 RX ORDER — POTASSIUM CHLORIDE 750 MG/1
TABLET, EXTENDED RELEASE ORAL
Qty: 28 TABLET | Refills: 0 | Status: SHIPPED | OUTPATIENT
Start: 2024-11-22

## 2024-11-22 RX ORDER — FUROSEMIDE 40 MG/1
TABLET ORAL
Qty: 28 TABLET | Refills: 0 | Status: SHIPPED | OUTPATIENT
Start: 2024-11-22

## 2024-11-22 RX ORDER — PYRIDOSTIGMINE BROMIDE 60 MG/1
TABLET ORAL
Qty: 28 TABLET | Refills: 0 | Status: SHIPPED | OUTPATIENT
Start: 2024-11-22

## 2024-12-09 ENCOUNTER — TELEPHONE (OUTPATIENT)
Dept: PRIMARY CARE CLINIC | Age: 69
End: 2024-12-09

## 2024-12-09 NOTE — TELEPHONE ENCOUNTER
Kori (daughter) has concerns regarding patients quality of life and is asking for advise.    Kori states patient sleeps 20+ hours each day, patient also started having fainting spells while in the lift chair. Kori states patient is currently living with Kori.    Please advise if patient should be seen or what is recommended.

## 2024-12-09 NOTE — TELEPHONE ENCOUNTER
Need to check blood pressure if passing out, need to make sure it's not too low.  Otherwise follow up office

## 2024-12-17 ENCOUNTER — OFFICE VISIT (OUTPATIENT)
Dept: PRIMARY CARE CLINIC | Age: 69
End: 2024-12-17
Payer: COMMERCIAL

## 2024-12-17 VITALS
HEART RATE: 72 BPM | BODY MASS INDEX: 37.2 KG/M2 | SYSTOLIC BLOOD PRESSURE: 162 MMHG | DIASTOLIC BLOOD PRESSURE: 100 MMHG | OXYGEN SATURATION: 96 % | HEIGHT: 63 IN

## 2024-12-17 DIAGNOSIS — E11.9 CONTROLLED TYPE 2 DIABETES MELLITUS WITHOUT COMPLICATION, WITHOUT LONG-TERM CURRENT USE OF INSULIN (HCC): ICD-10-CM

## 2024-12-17 DIAGNOSIS — Z79.01 ON CONTINUOUS ORAL ANTICOAGULATION: ICD-10-CM

## 2024-12-17 DIAGNOSIS — Z13.6 ENCOUNTER FOR LIPID SCREENING FOR CARDIOVASCULAR DISEASE: ICD-10-CM

## 2024-12-17 DIAGNOSIS — I89.0 LYMPHEDEMA OF BOTH LOWER EXTREMITIES: ICD-10-CM

## 2024-12-17 DIAGNOSIS — Z13.220 ENCOUNTER FOR LIPID SCREENING FOR CARDIOVASCULAR DISEASE: ICD-10-CM

## 2024-12-17 DIAGNOSIS — I10 ESSENTIAL HYPERTENSION: Primary | ICD-10-CM

## 2024-12-17 DIAGNOSIS — K21.9 GASTROESOPHAGEAL REFLUX DISEASE WITHOUT ESOPHAGITIS: ICD-10-CM

## 2024-12-17 DIAGNOSIS — Z00.00 ANNUAL PHYSICAL EXAM: ICD-10-CM

## 2024-12-17 LAB
ALBUMIN/GLOBULIN RATIO: 1.2 (ref 1–2.5)
ALBUMIN: 3.8 G/DL (ref 3.5–5.2)
ALP BLD-CCNC: 81 U/L (ref 35–104)
ALT SERPL-CCNC: 14 U/L (ref 10–35)
ANION GAP SERPL CALCULATED.3IONS-SCNC: 9 MMOL/L (ref 9–16)
AST SERPL-CCNC: 16 U/L (ref 10–35)
BASOPHILS ABSOLUTE: 0.03 K/UL (ref 0–0.2)
BASOPHILS RELATIVE PERCENT: 0 % (ref 0–2)
BILIRUB SERPL-MCNC: 0.8 MG/DL (ref 0–1.2)
BILIRUBIN DIRECT: 0.4 MG/DL (ref 0–0.2)
BILIRUBIN, INDIRECT: 0.4 MG/DL (ref 0–1)
BUN BLDV-MCNC: 14 MG/DL (ref 8–23)
CALCIUM SERPL-MCNC: 8.8 MG/DL (ref 8.6–10.4)
CHLORIDE BLD-SCNC: 104 MMOL/L (ref 98–107)
CHOLESTEROL, FASTING: 121 MG/DL (ref 0–199)
CHOLESTEROL/HDL RATIO: 2.7
CO2: 31 MMOL/L (ref 20–31)
CREAT SERPL-MCNC: 0.7 MG/DL (ref 0.6–0.9)
EOSINOPHILS ABSOLUTE: 0.1 K/UL (ref 0–0.44)
EOSINOPHILS RELATIVE PERCENT: 1 % (ref 1–4)
GFR, ESTIMATED: >90 ML/MIN/1.73M2
GLOBULIN: 3.3 G/DL
GLUCOSE FASTING: 100 MG/DL (ref 74–99)
HCT VFR BLD CALC: 43.1 % (ref 36.3–47.1)
HDLC SERPL-MCNC: 45 MG/DL
HEMOGLOBIN: 13 G/DL (ref 11.9–15.1)
IMMATURE GRANULOCYTES %: 0 %
IMMATURE GRANULOCYTES ABSOLUTE: 0.03 K/UL (ref 0–0.3)
LDL CHOLESTEROL: 58 MG/DL (ref 0–100)
LYMPHOCYTES ABSOLUTE: 2.67 K/UL (ref 1.1–3.7)
LYMPHOCYTES RELATIVE PERCENT: 34 % (ref 24–43)
MCH RBC QN AUTO: 28.2 PG (ref 25.2–33.5)
MCHC RBC AUTO-ENTMCNC: 30.2 G/DL (ref 28.4–34.8)
MCV RBC AUTO: 93.5 FL (ref 82.6–102.9)
MONOCYTES ABSOLUTE: 0.42 K/UL (ref 0.1–1.2)
MONOCYTES RELATIVE PERCENT: 5 % (ref 3–12)
NEUTROPHILS ABSOLUTE: 4.56 K/UL (ref 1.5–8.1)
NEUTROPHILS RELATIVE PERCENT: 60 % (ref 36–65)
NRBC AUTOMATED: 0 PER 100 WBC
PDW BLD-RTO: 15.6 % (ref 11.8–14.4)
PLATELET # BLD: 235 K/UL (ref 138–453)
PMV BLD AUTO: 11.4 FL (ref 8.1–13.5)
POTASSIUM SERPL-SCNC: 4 MMOL/L (ref 3.7–5.3)
RBC # BLD: 4.61 M/UL (ref 3.95–5.11)
RBC # BLD: ABNORMAL 10*6/UL
SODIUM BLD-SCNC: 144 MMOL/L (ref 136–145)
TOTAL PROTEIN: 7.1 G/DL (ref 6.6–8.7)
TRIGLYCERIDE, FASTING: 89 MG/DL (ref 0–149)
VLDLC SERPL CALC-MCNC: 18 MG/DL (ref 1–30)
WBC # BLD: 7.8 K/UL (ref 3.5–11.3)

## 2024-12-17 PROCEDURE — 99214 OFFICE O/P EST MOD 30 MIN: CPT | Performed by: FAMILY MEDICINE

## 2024-12-17 PROCEDURE — 3080F DIAST BP >= 90 MM HG: CPT | Performed by: FAMILY MEDICINE

## 2024-12-17 PROCEDURE — 1159F MED LIST DOCD IN RCRD: CPT | Performed by: FAMILY MEDICINE

## 2024-12-17 PROCEDURE — 3052F HG A1C>EQUAL 8.0%<EQUAL 9.0%: CPT | Performed by: FAMILY MEDICINE

## 2024-12-17 PROCEDURE — 3077F SYST BP >= 140 MM HG: CPT | Performed by: FAMILY MEDICINE

## 2024-12-17 PROCEDURE — 1123F ACP DISCUSS/DSCN MKR DOCD: CPT | Performed by: FAMILY MEDICINE

## 2024-12-17 RX ORDER — FUROSEMIDE 40 MG/1
TABLET ORAL
Qty: 90 TABLET | Refills: 3 | Status: SHIPPED | OUTPATIENT
Start: 2024-12-17

## 2024-12-17 RX ORDER — ATORVASTATIN CALCIUM 20 MG/1
TABLET, FILM COATED ORAL
Qty: 90 TABLET | Refills: 3 | Status: SHIPPED | OUTPATIENT
Start: 2024-12-17

## 2024-12-17 RX ORDER — ATORVASTATIN CALCIUM 20 MG/1
TABLET, FILM COATED ORAL
Qty: 28 TABLET | Refills: 0 | Status: SHIPPED | OUTPATIENT
Start: 2024-12-17 | End: 2024-12-17

## 2024-12-17 RX ORDER — FUROSEMIDE 40 MG/1
TABLET ORAL
Qty: 28 TABLET | Refills: 0 | Status: SHIPPED | OUTPATIENT
Start: 2024-12-17 | End: 2024-12-17

## 2024-12-17 RX ORDER — POTASSIUM CHLORIDE 750 MG/1
TABLET, EXTENDED RELEASE ORAL
Qty: 28 TABLET | Refills: 0 | Status: SHIPPED | OUTPATIENT
Start: 2024-12-17

## 2024-12-17 RX ORDER — METOPROLOL TARTRATE 25 MG/1
25 TABLET, FILM COATED ORAL 2 TIMES DAILY
Qty: 180 TABLET | Refills: 3 | Status: SHIPPED | OUTPATIENT
Start: 2024-12-17

## 2024-12-17 RX ORDER — PYRIDOSTIGMINE BROMIDE 60 MG/1
TABLET ORAL
Qty: 28 TABLET | Refills: 0 | Status: SHIPPED | OUTPATIENT
Start: 2024-12-17

## 2024-12-17 ASSESSMENT — ENCOUNTER SYMPTOMS
NAUSEA: 0
VOMITING: 0
EYE REDNESS: 0
EYE DISCHARGE: 0
SHORTNESS OF BREATH: 0
COUGH: 0
SORE THROAT: 0
RHINORRHEA: 0
DIARRHEA: 0
ABDOMINAL PAIN: 0
WHEEZING: 0

## 2024-12-17 NOTE — PROGRESS NOTES
[facility]  [facility]  [address]  [place] [address]  Dept: [phone]    Claire Rubin is a 69 y.o. female Established patient, who presents today for her medical conditions/complaints as noted below.      Chief Complaint   Patient presents with    Fatigue     Patient notes excessive tiredness, possibly due to pain.    Dizziness     Patient reports when assisted to stand she feels dizzy and almost like she could pass out.       HPI:     HPI  Pt states comes close to passing out at home.  Usually with lifting.   Patient denies any nausea or vomiting.  No chest pain or shortness of breath.    Has not been checking her blood pressure blood sugars outside the office.    Has known history of edema.  Stable at present.    Denies any open wounds.    Moods been doing well.    Reviewed prior notes None  Reviewed previous Labs    No components found for: \"LDLCHOLESTEROL\", \"LDLCALC\"    (goal LDL is <100)   AST (U/L)   Date Value   03/28/2024 17     ALT (U/L)   Date Value   03/28/2024 16     BUN (mg/dL)   Date Value   08/13/2024 15     Hemoglobin A1C (%)   Date Value   11/12/2024 8.1     TSH (uIU/mL)   Date Value   03/28/2024 2.99     BP Readings from Last 3 Encounters:   12/17/24 (!) 162/100   11/12/24 134/82   08/13/24 138/88          (goal 120/80)    Past Medical History:   Diagnosis Date    Acute transverse myelitis in demyelinating disease of central nervous system (HCC)     Atrial fibrillation (HCC)     Depression     Hyperlipidemia     Hypertension       Past Surgical History:   Procedure Laterality Date    APPENDECTOMY  1988    CHOLECYSTECTOMY, OPEN  1984    FEMUR FRACTURE SURGERY Left 05/2018    HYSTERECTOMY, TOTAL ABDOMINAL (CERVIX REMOVED)  1988    OVARY REMOVAL         History reviewed. No pertinent family history.    Social History     Tobacco Use    Smoking status: Never    Smokeless tobacco: Never   Substance Use Topics    Alcohol use: No      Current

## 2025-01-23 DIAGNOSIS — K21.9 GASTROESOPHAGEAL REFLUX DISEASE WITHOUT ESOPHAGITIS: ICD-10-CM

## 2025-01-23 RX ORDER — PYRIDOSTIGMINE BROMIDE 60 MG/1
TABLET ORAL
Qty: 28 TABLET | Refills: 0 | Status: SHIPPED | OUTPATIENT
Start: 2025-01-23

## 2025-01-23 RX ORDER — POTASSIUM CHLORIDE 750 MG/1
TABLET, EXTENDED RELEASE ORAL
Qty: 28 TABLET | Refills: 0 | Status: SHIPPED | OUTPATIENT
Start: 2025-01-23

## 2025-02-13 DIAGNOSIS — K21.9 GASTROESOPHAGEAL REFLUX DISEASE WITHOUT ESOPHAGITIS: ICD-10-CM

## 2025-02-13 RX ORDER — POTASSIUM CHLORIDE 750 MG/1
TABLET, EXTENDED RELEASE ORAL
Qty: 28 TABLET | Refills: 5 | Status: SHIPPED | OUTPATIENT
Start: 2025-02-13

## 2025-02-13 RX ORDER — PYRIDOSTIGMINE BROMIDE 60 MG/1
TABLET ORAL
Qty: 28 TABLET | Refills: 5 | Status: SHIPPED | OUTPATIENT
Start: 2025-02-13

## 2025-03-14 RX ORDER — OXYBUTYNIN CHLORIDE 10 MG/1
TABLET, EXTENDED RELEASE ORAL
Qty: 28 TABLET | Refills: 0 | Status: SHIPPED | OUTPATIENT
Start: 2025-03-14

## 2025-03-19 ENCOUNTER — TELEPHONE (OUTPATIENT)
Dept: PRIMARY CARE CLINIC | Age: 70
End: 2025-03-19

## 2025-03-19 NOTE — TELEPHONE ENCOUNTER
They need to check her blood pressure at home and make sure it is not going too low.  Go to emergency room if symptoms continue.  Otherwise follow-up in office

## 2025-03-19 NOTE — TELEPHONE ENCOUNTER
Daughter Kori states patient is passing out in the lift, states in the past week it's been at least once nightly but has been going on for a month. Patient not eating much, sleeping all the time, confused, doesn't know the day.

## 2025-04-10 ENCOUNTER — OFFICE VISIT (OUTPATIENT)
Dept: PRIMARY CARE CLINIC | Age: 70
End: 2025-04-10
Payer: COMMERCIAL

## 2025-04-10 VITALS
SYSTOLIC BLOOD PRESSURE: 138 MMHG | WEIGHT: 210 LBS | DIASTOLIC BLOOD PRESSURE: 80 MMHG | BODY MASS INDEX: 37.21 KG/M2 | HEIGHT: 63 IN | OXYGEN SATURATION: 96 % | HEART RATE: 64 BPM

## 2025-04-10 DIAGNOSIS — E11.9 CONTROLLED TYPE 2 DIABETES MELLITUS WITHOUT COMPLICATION, WITHOUT LONG-TERM CURRENT USE OF INSULIN: Primary | ICD-10-CM

## 2025-04-10 DIAGNOSIS — Z78.0 MENOPAUSE: ICD-10-CM

## 2025-04-10 LAB — HBA1C MFR BLD: 7.1 %

## 2025-04-10 PROCEDURE — 99214 OFFICE O/P EST MOD 30 MIN: CPT | Performed by: FAMILY MEDICINE

## 2025-04-10 PROCEDURE — 3075F SYST BP GE 130 - 139MM HG: CPT | Performed by: FAMILY MEDICINE

## 2025-04-10 PROCEDURE — 1123F ACP DISCUSS/DSCN MKR DOCD: CPT | Performed by: FAMILY MEDICINE

## 2025-04-10 PROCEDURE — 3051F HG A1C>EQUAL 7.0%<8.0%: CPT | Performed by: FAMILY MEDICINE

## 2025-04-10 PROCEDURE — 3079F DIAST BP 80-89 MM HG: CPT | Performed by: FAMILY MEDICINE

## 2025-04-10 PROCEDURE — 83036 HEMOGLOBIN GLYCOSYLATED A1C: CPT | Performed by: FAMILY MEDICINE

## 2025-04-10 SDOH — ECONOMIC STABILITY: FOOD INSECURITY: WITHIN THE PAST 12 MONTHS, YOU WORRIED THAT YOUR FOOD WOULD RUN OUT BEFORE YOU GOT MONEY TO BUY MORE.: NEVER TRUE

## 2025-04-10 SDOH — ECONOMIC STABILITY: FOOD INSECURITY: WITHIN THE PAST 12 MONTHS, THE FOOD YOU BOUGHT JUST DIDN'T LAST AND YOU DIDN'T HAVE MONEY TO GET MORE.: NEVER TRUE

## 2025-04-10 ASSESSMENT — ENCOUNTER SYMPTOMS
RHINORRHEA: 0
EYE DISCHARGE: 0
EYE REDNESS: 0
ABDOMINAL PAIN: 0
WHEEZING: 0
SHORTNESS OF BREATH: 0
DIARRHEA: 0
VOMITING: 0
COUGH: 0
NAUSEA: 0
SORE THROAT: 0

## 2025-04-10 ASSESSMENT — PATIENT HEALTH QUESTIONNAIRE - PHQ9
SUM OF ALL RESPONSES TO PHQ QUESTIONS 1-9: 0
DEPRESSION UNABLE TO ASSESS: FUNCTIONAL CAPACITY MOTIVATION LIMITS ACCURACY
SUM OF ALL RESPONSES TO PHQ QUESTIONS 1-9: 0
2. FEELING DOWN, DEPRESSED OR HOPELESS: NOT AT ALL
SUM OF ALL RESPONSES TO PHQ QUESTIONS 1-9: 0
SUM OF ALL RESPONSES TO PHQ QUESTIONS 1-9: 0
1. LITTLE INTEREST OR PLEASURE IN DOING THINGS: NOT AT ALL

## 2025-04-10 NOTE — PROGRESS NOTES
[facility]  [facility]  [address]  [place] [address]  Dept: [phone]    Claire Rubin is a 70 y.o. female Established patient, who presents today for her medical conditions/complaints as noted below.      Chief Complaint   Patient presents with    Diabetes     Patient states they the following concerns night time issues and passing out       HPI:     History of Present Illness  The patient is a 70-year-old female who presents for a follow-up of diabetes.    She has not been monitoring her blood glucose levels. Episodes of dizziness, particularly at night, are reported, which are more pronounced when she attempts to stand up from a seated position. These episodes persist until she is able to sit down again. A few weeks ago, she and her family were ill, which caused her to miss a previous appointment. Intermittent leg swelling is noted, which improves with elevation. No screening for colon cancer has been done, and she has not had a DEXA scan. She spends the majority of her day seated in a chair due to pain or discomfort, necessitating constant adjustments for comfort. Persistent pain in the tailbone region is experienced, which she attributes to prolonged sitting despite the use of a cushion. No need for medication refills, chest heaviness or pressure sensations, and no open sores are reported. Sleep pattern is normal, with no reported disturbances. She continues her regimen of Xarelto and Mestinon, although minimal relief from the latter is reported.      Reviewed prior notes: None  Reviewed previous: Labs    LDL Cholesterol (mg/dL)   Date Value   12/17/2024 58     LDL Calculated (mg/dL)   Date Value   03/27/2023 115     HDL (mg/dL)   Date Value   12/17/2024 45       (goal LDL is <100)   AST (U/L)   Date Value   12/17/2024 16     ALT (U/L)   Date Value   12/17/2024 14     BUN (mg/dL)   Date Value   12/17/2024 14     Hemoglobin A1C (%)   Date Value   11/12/2024

## 2025-04-14 DIAGNOSIS — G37.3: ICD-10-CM

## 2025-04-14 RX ORDER — DULOXETIN HYDROCHLORIDE 30 MG/1
CAPSULE, DELAYED RELEASE ORAL
Qty: 28 CAPSULE | Refills: 0 | Status: SHIPPED | OUTPATIENT
Start: 2025-04-14

## 2025-04-14 RX ORDER — OXYBUTYNIN CHLORIDE 10 MG/1
TABLET, EXTENDED RELEASE ORAL
Qty: 28 TABLET | Refills: 0 | Status: SHIPPED | OUTPATIENT
Start: 2025-04-14

## 2025-04-17 RX ORDER — CYCLOBENZAPRINE HCL 10 MG
TABLET ORAL
Qty: 60 TABLET | Refills: 0 | Status: SHIPPED | OUTPATIENT
Start: 2025-04-17

## 2025-04-23 ENCOUNTER — TELEPHONE (OUTPATIENT)
Dept: PRIMARY CARE CLINIC | Age: 70
End: 2025-04-23

## 2025-04-23 NOTE — TELEPHONE ENCOUNTER
Writer received call from patient stating she has a sore on her bottom and was hoping we could send over a cream to help.  Please send the cream if ok to [facility] pharmacy in [place].    She also needs a 2xl brief and pull up, she uses with michel

## 2025-04-25 ENCOUNTER — TELEPHONE (OUTPATIENT)
Dept: PRIMARY CARE CLINIC | Age: 70
End: 2025-04-25

## 2025-04-25 NOTE — TELEPHONE ENCOUNTER
Patient called, stated yes she still plans to get the Dexa bone density test done but has not been feeling good. Patient stated she has the number to call and schedule and will as soon as she can.

## 2025-04-29 ENCOUNTER — TELEPHONE (OUTPATIENT)
Dept: PRIMARY CARE CLINIC | Age: 70
End: 2025-04-29

## 2025-04-29 DIAGNOSIS — R32 URINARY INCONTINENCE, UNSPECIFIED TYPE: Primary | ICD-10-CM

## 2025-04-29 DIAGNOSIS — G37.3: ICD-10-CM

## 2025-04-29 NOTE — TELEPHONE ENCOUNTER
patient called and stated that she needs to size up to a 2x in incontinence briefs and would also like pull ups in that size.    She called [facility] and they told her that they need the okay from her PCP to make the size change.    Please advise.

## 2025-05-07 DIAGNOSIS — E11.9 CONTROLLED TYPE 2 DIABETES MELLITUS WITHOUT COMPLICATION, WITHOUT LONG-TERM CURRENT USE OF INSULIN (HCC): ICD-10-CM

## 2025-05-07 LAB
CREATININE URINE: 63.7 MG/DL (ref 28–217)
MICROALBUMIN/CREAT 24H UR: 159 MG/L (ref 0–20)
MICROALBUMIN/CREAT UR-RTO: 250 MCG/MG CREAT (ref 0–25)

## 2025-05-08 ENCOUNTER — RESULTS FOLLOW-UP (OUTPATIENT)
Dept: PRIMARY CARE CLINIC | Age: 70
End: 2025-05-08

## 2025-05-13 DIAGNOSIS — G37.3: ICD-10-CM

## 2025-05-13 RX ORDER — OXYBUTYNIN CHLORIDE 10 MG/1
TABLET, EXTENDED RELEASE ORAL
Qty: 28 TABLET | Refills: 0 | Status: SHIPPED | OUTPATIENT
Start: 2025-05-13

## 2025-05-13 RX ORDER — LOSARTAN POTASSIUM 100 MG/1
TABLET ORAL
Qty: 28 TABLET | Refills: 0 | Status: SHIPPED | OUTPATIENT
Start: 2025-05-13

## 2025-05-13 RX ORDER — DULOXETIN HYDROCHLORIDE 30 MG/1
CAPSULE, DELAYED RELEASE ORAL
Qty: 28 CAPSULE | Refills: 0 | Status: SHIPPED | OUTPATIENT
Start: 2025-05-13

## 2025-05-13 RX ORDER — CYCLOBENZAPRINE HCL 10 MG
TABLET ORAL
Qty: 60 TABLET | Refills: 0 | Status: SHIPPED | OUTPATIENT
Start: 2025-05-13

## 2025-05-27 ENCOUNTER — TELEPHONE (OUTPATIENT)
Dept: PRIMARY CARE CLINIC | Age: 70
End: 2025-05-27

## 2025-05-27 NOTE — TELEPHONE ENCOUNTER
Patient asking for respite orders to [facility] for 6/13 thru 6/15. Would like the orders faxed to her area office on [address]  [address] [phone]

## 2025-06-02 ENCOUNTER — HOSPITAL ENCOUNTER (OUTPATIENT)
Dept: WOMENS IMAGING | Age: 70
Discharge: HOME | End: 2025-06-04
Attending: FAMILY MEDICINE
Payer: COMMERCIAL

## 2025-06-02 DIAGNOSIS — Z78.0 MENOPAUSE: ICD-10-CM

## 2025-06-02 DIAGNOSIS — Z12.31 ENCOUNTER FOR SCREENING MAMMOGRAM FOR BREAST CANCER: ICD-10-CM

## 2025-06-02 PROCEDURE — 77063 BREAST TOMOSYNTHESIS BI: CPT

## 2025-06-02 PROCEDURE — 77081 DXA BONE DENSITY APPENDICULR: CPT

## 2025-06-03 ENCOUNTER — RESULTS FOLLOW-UP (OUTPATIENT)
Dept: PRIMARY CARE CLINIC | Age: 70
End: 2025-06-03

## 2025-06-03 NOTE — RESULT ENCOUNTER NOTE
Patient called and notified of test results. Verbalized understanding, no questions or concerns at this time.

## 2025-06-06 DIAGNOSIS — G37.3: ICD-10-CM

## 2025-06-06 RX ORDER — OXYBUTYNIN CHLORIDE 10 MG/1
TABLET, EXTENDED RELEASE ORAL
Qty: 28 TABLET | Refills: 0 | Status: SHIPPED | OUTPATIENT
Start: 2025-06-06

## 2025-06-06 RX ORDER — CYCLOBENZAPRINE HCL 10 MG
TABLET ORAL
Qty: 60 TABLET | Refills: 0 | Status: SHIPPED | OUTPATIENT
Start: 2025-06-06

## 2025-06-06 RX ORDER — AMLODIPINE BESYLATE 5 MG/1
TABLET ORAL
Qty: 28 TABLET | Refills: 0 | Status: SHIPPED | OUTPATIENT
Start: 2025-06-06

## 2025-06-06 RX ORDER — DULOXETIN HYDROCHLORIDE 30 MG/1
CAPSULE, DELAYED RELEASE ORAL
Qty: 28 CAPSULE | Refills: 0 | Status: SHIPPED | OUTPATIENT
Start: 2025-06-06

## 2025-06-06 RX ORDER — LOSARTAN POTASSIUM 100 MG/1
TABLET ORAL
Qty: 28 TABLET | Refills: 0 | Status: SHIPPED | OUTPATIENT
Start: 2025-06-06

## 2025-06-06 RX ORDER — COLLAGENASE SANTYL 250 [ARB'U]/G
OINTMENT TOPICAL DAILY
Qty: 1 EACH | Refills: 0 | Status: SHIPPED | OUTPATIENT
Start: 2025-06-06

## 2025-06-09 ENCOUNTER — RESULTS FOLLOW-UP (OUTPATIENT)
Dept: PRIMARY CARE CLINIC | Age: 70
End: 2025-06-09

## 2025-06-10 NOTE — TELEPHONE ENCOUNTER
Patient returned call and advised of Dexa result. Verbally understands with no questions or concerns at this time.

## 2025-07-02 ENCOUNTER — TELEPHONE (OUTPATIENT)
Dept: PRIMARY CARE CLINIC | Age: 70
End: 2025-07-02

## 2025-07-02 NOTE — TELEPHONE ENCOUNTER
[id number] called in needing to verify pcp will follow for home care. Verbal of yes given as patient has been seen within 6 months. Last seen on 4/10/25 with Dr. Yost.

## 2025-07-07 DIAGNOSIS — G37.3: ICD-10-CM

## 2025-07-07 RX ORDER — AMLODIPINE BESYLATE 5 MG/1
TABLET ORAL
Qty: 28 TABLET | Refills: 0 | Status: ON HOLD | OUTPATIENT
Start: 2025-07-07

## 2025-07-07 RX ORDER — CYCLOBENZAPRINE HCL 10 MG
TABLET ORAL
Qty: 60 TABLET | Refills: 0 | Status: ON HOLD | OUTPATIENT
Start: 2025-07-07

## 2025-07-07 RX ORDER — OXYBUTYNIN CHLORIDE 10 MG/1
TABLET, EXTENDED RELEASE ORAL
Qty: 28 TABLET | Refills: 0 | Status: ON HOLD | OUTPATIENT
Start: 2025-07-07

## 2025-07-07 RX ORDER — COLLAGENASE SANTYL 250 [ARB'U]/G
OINTMENT TOPICAL DAILY
Qty: 1 EACH | Refills: 0 | Status: ON HOLD | OUTPATIENT
Start: 2025-07-07

## 2025-07-07 RX ORDER — LOSARTAN POTASSIUM 100 MG/1
TABLET ORAL
Qty: 28 TABLET | Refills: 0 | Status: ON HOLD | OUTPATIENT
Start: 2025-07-07

## 2025-07-07 RX ORDER — DULOXETIN HYDROCHLORIDE 30 MG/1
CAPSULE, DELAYED RELEASE ORAL
Qty: 28 CAPSULE | Refills: 0 | Status: ON HOLD | OUTPATIENT
Start: 2025-07-07

## 2025-07-12 ENCOUNTER — HOSPITAL ENCOUNTER (INPATIENT)
Age: 70
LOS: 11 days | Discharge: NURSING, REHAB OR LONG-TERM CARE | DRG: 872 | End: 2025-07-24
Attending: EMERGENCY MEDICINE | Admitting: STUDENT IN AN ORGANIZED HEALTH CARE EDUCATION/TRAINING PROGRAM
Payer: COMMERCIAL

## 2025-07-12 DIAGNOSIS — R53.1 GENERALIZED WEAKNESS: Primary | ICD-10-CM

## 2025-07-12 DIAGNOSIS — S31.000A WOUND OF SACRAL REGION, INITIAL ENCOUNTER: ICD-10-CM

## 2025-07-12 LAB
ALBUMIN SERPL-MCNC: 3.3 G/DL (ref 3.5–5.2)
ALBUMIN/GLOB SERPL: 1 {RATIO} (ref 1–2.5)
ALP SERPL-CCNC: 69 U/L (ref 35–104)
ALT SERPL-CCNC: 10 U/L (ref 10–35)
ANION GAP SERPL CALCULATED.3IONS-SCNC: 18 MMOL/L (ref 9–16)
AST SERPL-CCNC: 19 U/L (ref 10–35)
BASOPHILS # BLD: 0.1 K/UL (ref 0–0.2)
BASOPHILS NFR BLD: 1 % (ref 0–2)
BILIRUB DIRECT SERPL-MCNC: 0.4 MG/DL (ref 0–0.2)
BILIRUB INDIRECT SERPL-MCNC: 0.6 MG/DL (ref 0–1)
BILIRUB SERPL-MCNC: 1 MG/DL (ref 0–1.2)
BUN SERPL-MCNC: 35 MG/DL (ref 8–23)
CALCIUM SERPL-MCNC: 8.1 MG/DL (ref 8.6–10.4)
CHLORIDE SERPL-SCNC: 98 MMOL/L (ref 98–107)
CO2 SERPL-SCNC: 24 MMOL/L (ref 20–31)
CREAT SERPL-MCNC: 2.5 MG/DL (ref 0.6–0.9)
EOSINOPHIL # BLD: 0.1 K/UL (ref 0–0.4)
EOSINOPHILS RELATIVE PERCENT: 1 % (ref 1–4)
ERYTHROCYTE [DISTWIDTH] IN BLOOD BY AUTOMATED COUNT: 18.2 % (ref 12.5–15.4)
GFR, ESTIMATED: 20 ML/MIN/1.73M2
GLUCOSE SERPL-MCNC: 158 MG/DL (ref 74–99)
HCT VFR BLD AUTO: 36.4 % (ref 36–46)
HGB BLD-MCNC: 11.8 G/DL (ref 12–16)
INR PPP: 1.5 (ref 0.9–1.2)
LACTATE BLDV-SCNC: 4.3 MMOL/L (ref 0.5–1.9)
LIPASE SERPL-CCNC: 53 U/L (ref 13–60)
LYMPHOCYTES NFR BLD: 1.9 K/UL (ref 1–4.8)
LYMPHOCYTES RELATIVE PERCENT: 15 % (ref 24–44)
MAGNESIUM SERPL-MCNC: 0.8 MG/DL (ref 1.6–2.4)
MCH RBC QN AUTO: 28.6 PG (ref 26–34)
MCHC RBC AUTO-ENTMCNC: 32.4 G/DL (ref 31–37)
MCV RBC AUTO: 88.4 FL (ref 80–100)
MONOCYTES NFR BLD: 0.6 K/UL (ref 0.1–1.2)
MONOCYTES NFR BLD: 5 % (ref 2–11)
NEUTROPHILS NFR BLD: 78 % (ref 36–66)
NEUTS SEG NFR BLD: 10.3 K/UL (ref 1.8–7.7)
PARTIAL THROMBOPLASTIN TIME: 30.2 SEC (ref 24–36)
PLATELET # BLD AUTO: 226 K/UL (ref 140–450)
PMV BLD AUTO: 9.2 FL (ref 6–12)
POTASSIUM SERPL-SCNC: 3.6 MMOL/L (ref 3.7–5.3)
PROT SERPL-MCNC: 6.6 G/DL (ref 6.6–8.7)
PROTHROMBIN TIME: 19 SEC (ref 11.8–14.6)
RBC # BLD AUTO: 4.12 M/UL (ref 4–5.2)
SODIUM SERPL-SCNC: 140 MMOL/L (ref 136–145)
TROPONIN I SERPL HS-MCNC: 99 NG/L (ref 0–14)
WBC OTHER # BLD: 13 K/UL (ref 3.5–11)

## 2025-07-12 PROCEDURE — 93005 ELECTROCARDIOGRAM TRACING: CPT | Performed by: EMERGENCY MEDICINE

## 2025-07-12 PROCEDURE — 80076 HEPATIC FUNCTION PANEL: CPT

## 2025-07-12 PROCEDURE — 85610 PROTHROMBIN TIME: CPT

## 2025-07-12 PROCEDURE — 83690 ASSAY OF LIPASE: CPT

## 2025-07-12 PROCEDURE — 80048 BASIC METABOLIC PNL TOTAL CA: CPT

## 2025-07-12 PROCEDURE — 36415 COLL VENOUS BLD VENIPUNCTURE: CPT

## 2025-07-12 PROCEDURE — 87040 BLOOD CULTURE FOR BACTERIA: CPT

## 2025-07-12 PROCEDURE — 83735 ASSAY OF MAGNESIUM: CPT

## 2025-07-12 PROCEDURE — 2580000003 HC RX 258: Performed by: EMERGENCY MEDICINE

## 2025-07-12 PROCEDURE — 85730 THROMBOPLASTIN TIME PARTIAL: CPT

## 2025-07-12 PROCEDURE — 84484 ASSAY OF TROPONIN QUANT: CPT

## 2025-07-12 PROCEDURE — 6360000002 HC RX W HCPCS: Performed by: EMERGENCY MEDICINE

## 2025-07-12 PROCEDURE — 83605 ASSAY OF LACTIC ACID: CPT

## 2025-07-12 PROCEDURE — 96366 THER/PROPH/DIAG IV INF ADDON: CPT

## 2025-07-12 PROCEDURE — 85025 COMPLETE CBC W/AUTO DIFF WBC: CPT

## 2025-07-12 PROCEDURE — 99285 EMERGENCY DEPT VISIT HI MDM: CPT

## 2025-07-12 RX ORDER — 0.9 % SODIUM CHLORIDE 0.9 %
1000 INTRAVENOUS SOLUTION INTRAVENOUS ONCE
Status: COMPLETED | OUTPATIENT
Start: 2025-07-12 | End: 2025-07-13

## 2025-07-12 RX ORDER — MAGNESIUM SULFATE IN WATER 40 MG/ML
2000 INJECTION, SOLUTION INTRAVENOUS ONCE
Status: COMPLETED | OUTPATIENT
Start: 2025-07-12 | End: 2025-07-13

## 2025-07-12 RX ADMIN — MAGNESIUM SULFATE HEPTAHYDRATE 2000 MG: 40 INJECTION, SOLUTION INTRAVENOUS at 23:11

## 2025-07-12 RX ADMIN — SODIUM CHLORIDE 1000 ML: 0.9 INJECTION, SOLUTION INTRAVENOUS at 23:09

## 2025-07-13 ENCOUNTER — APPOINTMENT (OUTPATIENT)
Dept: GENERAL RADIOLOGY | Age: 70
DRG: 872 | End: 2025-07-13
Payer: COMMERCIAL

## 2025-07-13 PROBLEM — R53.1 GENERALIZED WEAKNESS: Status: ACTIVE | Noted: 2025-07-13

## 2025-07-13 PROBLEM — A41.9 SEPSIS (HCC): Status: ACTIVE | Noted: 2025-07-13

## 2025-07-13 LAB
AMORPH SED URNS QL MICRO: ABNORMAL
ANION GAP SERPL CALCULATED.3IONS-SCNC: 15 MMOL/L (ref 9–16)
BACTERIA URNS QL MICRO: ABNORMAL
BILIRUB UR QL STRIP: NEGATIVE
BUN SERPL-MCNC: 34 MG/DL (ref 8–23)
CALCIUM SERPL-MCNC: 7.6 MG/DL (ref 8.6–10.4)
CHARACTER UR: ABNORMAL
CHLORIDE SERPL-SCNC: 103 MMOL/L (ref 98–107)
CLARITY UR: ABNORMAL
CO2 SERPL-SCNC: 23 MMOL/L (ref 20–31)
COLOR UR: YELLOW
CREAT SERPL-MCNC: 2.2 MG/DL (ref 0.6–0.9)
EKG ATRIAL RATE: 66 BPM
EKG P AXIS: 77 DEGREES
EKG P-R INTERVAL: 136 MS
EKG Q-T INTERVAL: 444 MS
EKG QRS DURATION: 106 MS
EKG QTC CALCULATION (BAZETT): 465 MS
EKG R AXIS: -16 DEGREES
EKG T AXIS: 9 DEGREES
EKG VENTRICULAR RATE: 66 BPM
EPI CELLS #/AREA URNS HPF: ABNORMAL /HPF (ref 0–5)
GFR, ESTIMATED: 24 ML/MIN/1.73M2
GLUCOSE BLD-MCNC: 110 MG/DL (ref 65–105)
GLUCOSE BLD-MCNC: 70 MG/DL (ref 65–105)
GLUCOSE BLD-MCNC: 86 MG/DL (ref 65–105)
GLUCOSE SERPL-MCNC: 105 MG/DL (ref 74–99)
GLUCOSE UR STRIP-MCNC: ABNORMAL MG/DL
HGB UR QL STRIP.AUTO: ABNORMAL
KETONES UR STRIP-MCNC: NEGATIVE MG/DL
LACTATE BLDV-SCNC: 0.6 MMOL/L (ref 0.5–1.9)
LACTATE BLDV-SCNC: 1.2 MMOL/L (ref 0.5–1.9)
LACTATE BLDV-SCNC: 2.1 MMOL/L (ref 0.5–1.9)
LEUKOCYTE ESTERASE UR QL STRIP: ABNORMAL
MAGNESIUM SERPL-MCNC: 1.6 MG/DL (ref 1.6–2.4)
MUCOUS THREADS URNS QL MICRO: ABNORMAL
NITRITE UR QL STRIP: NEGATIVE
PH UR STRIP: 6 [PH] (ref 5–8)
POTASSIUM SERPL-SCNC: 3.5 MMOL/L (ref 3.7–5.3)
PROT UR STRIP-MCNC: ABNORMAL MG/DL
RBC #/AREA URNS HPF: ABNORMAL /HPF (ref 0–2)
SODIUM SERPL-SCNC: 141 MMOL/L (ref 136–145)
SP GR UR STRIP: 1.01 (ref 1–1.03)
TROPONIN I SERPL HS-MCNC: 91 NG/L (ref 0–14)
UROBILINOGEN UR STRIP-ACNC: NORMAL EU/DL (ref 0–1)
WBC #/AREA URNS HPF: ABNORMAL /HPF (ref 0–5)

## 2025-07-13 PROCEDURE — 81001 URINALYSIS AUTO W/SCOPE: CPT

## 2025-07-13 PROCEDURE — 82947 ASSAY GLUCOSE BLOOD QUANT: CPT

## 2025-07-13 PROCEDURE — 87077 CULTURE AEROBIC IDENTIFY: CPT

## 2025-07-13 PROCEDURE — 71045 X-RAY EXAM CHEST 1 VIEW: CPT

## 2025-07-13 PROCEDURE — 80048 BASIC METABOLIC PNL TOTAL CA: CPT

## 2025-07-13 PROCEDURE — 97535 SELF CARE MNGMENT TRAINING: CPT

## 2025-07-13 PROCEDURE — 96375 TX/PRO/DX INJ NEW DRUG ADDON: CPT

## 2025-07-13 PROCEDURE — 6360000002 HC RX W HCPCS: Performed by: EMERGENCY MEDICINE

## 2025-07-13 PROCEDURE — 96365 THER/PROPH/DIAG IV INF INIT: CPT

## 2025-07-13 PROCEDURE — 83605 ASSAY OF LACTIC ACID: CPT

## 2025-07-13 PROCEDURE — 97530 THERAPEUTIC ACTIVITIES: CPT

## 2025-07-13 PROCEDURE — 2580000003 HC RX 258: Performed by: EMERGENCY MEDICINE

## 2025-07-13 PROCEDURE — 99232 SBSQ HOSP IP/OBS MODERATE 35: CPT | Performed by: STUDENT IN AN ORGANIZED HEALTH CARE EDUCATION/TRAINING PROGRAM

## 2025-07-13 PROCEDURE — 83735 ASSAY OF MAGNESIUM: CPT

## 2025-07-13 PROCEDURE — 97166 OT EVAL MOD COMPLEX 45 MIN: CPT

## 2025-07-13 PROCEDURE — 2580000003 HC RX 258

## 2025-07-13 PROCEDURE — 84484 ASSAY OF TROPONIN QUANT: CPT

## 2025-07-13 PROCEDURE — 6360000002 HC RX W HCPCS

## 2025-07-13 PROCEDURE — 6370000000 HC RX 637 (ALT 250 FOR IP)

## 2025-07-13 PROCEDURE — 96368 THER/DIAG CONCURRENT INF: CPT

## 2025-07-13 PROCEDURE — 97162 PT EVAL MOD COMPLEX 30 MIN: CPT

## 2025-07-13 PROCEDURE — 2060000000 HC ICU INTERMEDIATE R&B

## 2025-07-13 PROCEDURE — 87086 URINE CULTURE/COLONY COUNT: CPT

## 2025-07-13 PROCEDURE — 87186 SC STD MICRODIL/AGAR DIL: CPT

## 2025-07-13 PROCEDURE — APPSS30 APP SPLIT SHARED TIME 16-30 MINUTES

## 2025-07-13 PROCEDURE — 97110 THERAPEUTIC EXERCISES: CPT

## 2025-07-13 PROCEDURE — 93010 ELECTROCARDIOGRAM REPORT: CPT | Performed by: INTERNAL MEDICINE

## 2025-07-13 PROCEDURE — 36415 COLL VENOUS BLD VENIPUNCTURE: CPT

## 2025-07-13 RX ORDER — ACETAMINOPHEN 650 MG/1
650 SUPPOSITORY RECTAL EVERY 6 HOURS PRN
Status: DISCONTINUED | OUTPATIENT
Start: 2025-07-13 | End: 2025-07-25 | Stop reason: HOSPADM

## 2025-07-13 RX ORDER — AMLODIPINE BESYLATE 5 MG/1
5 TABLET ORAL DAILY
Status: DISCONTINUED | OUTPATIENT
Start: 2025-07-13 | End: 2025-07-17

## 2025-07-13 RX ORDER — GLUCAGON 1 MG/ML
1 KIT INJECTION PRN
Status: DISCONTINUED | OUTPATIENT
Start: 2025-07-13 | End: 2025-07-25 | Stop reason: HOSPADM

## 2025-07-13 RX ORDER — OXYBUTYNIN CHLORIDE 5 MG/1
10 TABLET, EXTENDED RELEASE ORAL NIGHTLY
Status: DISCONTINUED | OUTPATIENT
Start: 2025-07-13 | End: 2025-07-25 | Stop reason: HOSPADM

## 2025-07-13 RX ORDER — LOPERAMIDE HYDROCHLORIDE 2 MG/1
CAPSULE ORAL
Status: ON HOLD | COMMUNITY
Start: 2025-06-15 | End: 2025-07-21 | Stop reason: HOSPADM

## 2025-07-13 RX ORDER — DULOXETIN HYDROCHLORIDE 30 MG/1
30 CAPSULE, DELAYED RELEASE ORAL DAILY
Status: DISCONTINUED | OUTPATIENT
Start: 2025-07-13 | End: 2025-07-25 | Stop reason: HOSPADM

## 2025-07-13 RX ORDER — SODIUM CHLORIDE 0.9 % (FLUSH) 0.9 %
5-40 SYRINGE (ML) INJECTION PRN
Status: DISCONTINUED | OUTPATIENT
Start: 2025-07-13 | End: 2025-07-25 | Stop reason: HOSPADM

## 2025-07-13 RX ORDER — ONDANSETRON 8 MG/1
TABLET, FILM COATED ORAL
COMMUNITY
Start: 2025-06-22

## 2025-07-13 RX ORDER — SODIUM CHLORIDE 0.9 % (FLUSH) 0.9 %
5-40 SYRINGE (ML) INJECTION EVERY 12 HOURS SCHEDULED
Status: DISCONTINUED | OUTPATIENT
Start: 2025-07-13 | End: 2025-07-25 | Stop reason: HOSPADM

## 2025-07-13 RX ORDER — PANTOPRAZOLE SODIUM 40 MG/1
40 TABLET, DELAYED RELEASE ORAL
Status: DISCONTINUED | OUTPATIENT
Start: 2025-07-13 | End: 2025-07-25 | Stop reason: HOSPADM

## 2025-07-13 RX ORDER — SODIUM CHLORIDE 9 MG/ML
INJECTION, SOLUTION INTRAVENOUS PRN
Status: DISCONTINUED | OUTPATIENT
Start: 2025-07-13 | End: 2025-07-25 | Stop reason: HOSPADM

## 2025-07-13 RX ORDER — SODIUM CHLORIDE 9 MG/ML
INJECTION, SOLUTION INTRAVENOUS CONTINUOUS
Status: DISCONTINUED | OUTPATIENT
Start: 2025-07-13 | End: 2025-07-14

## 2025-07-13 RX ORDER — PYRIDOSTIGMINE BROMIDE 60 MG/1
60 TABLET ORAL DAILY
Status: DISCONTINUED | OUTPATIENT
Start: 2025-07-13 | End: 2025-07-25 | Stop reason: HOSPADM

## 2025-07-13 RX ORDER — CYCLOBENZAPRINE HCL 10 MG
10 TABLET ORAL 2 TIMES DAILY PRN
Status: DISCONTINUED | OUTPATIENT
Start: 2025-07-13 | End: 2025-07-25 | Stop reason: HOSPADM

## 2025-07-13 RX ORDER — QUETIAPINE FUMARATE 25 MG/1
50 TABLET, FILM COATED ORAL NIGHTLY
Status: DISCONTINUED | OUTPATIENT
Start: 2025-07-13 | End: 2025-07-25 | Stop reason: HOSPADM

## 2025-07-13 RX ORDER — ATORVASTATIN CALCIUM 20 MG/1
20 TABLET, FILM COATED ORAL NIGHTLY
Status: DISCONTINUED | OUTPATIENT
Start: 2025-07-13 | End: 2025-07-25 | Stop reason: HOSPADM

## 2025-07-13 RX ORDER — DEXTROSE MONOHYDRATE 100 MG/ML
INJECTION, SOLUTION INTRAVENOUS CONTINUOUS PRN
Status: DISCONTINUED | OUTPATIENT
Start: 2025-07-13 | End: 2025-07-25 | Stop reason: HOSPADM

## 2025-07-13 RX ORDER — METOPROLOL TARTRATE 25 MG/1
25 TABLET, FILM COATED ORAL 2 TIMES DAILY
Status: DISCONTINUED | OUTPATIENT
Start: 2025-07-13 | End: 2025-07-25 | Stop reason: HOSPADM

## 2025-07-13 RX ORDER — INSULIN LISPRO 100 [IU]/ML
0-8 INJECTION, SOLUTION INTRAVENOUS; SUBCUTANEOUS
Status: DISCONTINUED | OUTPATIENT
Start: 2025-07-13 | End: 2025-07-25 | Stop reason: HOSPADM

## 2025-07-13 RX ORDER — 0.9 % SODIUM CHLORIDE 0.9 %
600 INTRAVENOUS SOLUTION INTRAVENOUS ONCE
Status: COMPLETED | OUTPATIENT
Start: 2025-07-13 | End: 2025-07-13

## 2025-07-13 RX ORDER — NYSTATIN 100000 [USP'U]/G
POWDER TOPICAL
Status: ON HOLD | COMMUNITY
Start: 2025-07-07 | End: 2025-07-21

## 2025-07-13 RX ORDER — ACETAMINOPHEN 325 MG/1
650 TABLET ORAL EVERY 6 HOURS PRN
Status: DISCONTINUED | OUTPATIENT
Start: 2025-07-13 | End: 2025-07-25 | Stop reason: HOSPADM

## 2025-07-13 RX ADMIN — DULOXETINE 30 MG: 30 CAPSULE, DELAYED RELEASE ORAL at 08:42

## 2025-07-13 RX ADMIN — OXYBUTYNIN CHLORIDE 10 MG: 5 TABLET, EXTENDED RELEASE ORAL at 22:01

## 2025-07-13 RX ADMIN — VANCOMYCIN HYDROCHLORIDE 2000 MG: 1 INJECTION, POWDER, LYOPHILIZED, FOR SOLUTION INTRAVENOUS at 02:26

## 2025-07-13 RX ADMIN — SODIUM CHLORIDE 600 ML: 0.9 INJECTION, SOLUTION INTRAVENOUS at 01:11

## 2025-07-13 RX ADMIN — CEFEPIME 2000 MG: 2 INJECTION, POWDER, FOR SOLUTION INTRAVENOUS at 06:53

## 2025-07-13 RX ADMIN — PIPERACILLIN AND TAZOBACTAM 3375 MG: 3; .375 INJECTION, POWDER, LYOPHILIZED, FOR SOLUTION INTRAVENOUS at 01:13

## 2025-07-13 RX ADMIN — SODIUM CHLORIDE: 0.9 INJECTION, SOLUTION INTRAVENOUS at 05:25

## 2025-07-13 RX ADMIN — PYRIDOSTIGMINE BROMIDE 60 MG: 60 TABLET ORAL at 08:45

## 2025-07-13 RX ADMIN — ATORVASTATIN CALCIUM 20 MG: 20 TABLET, FILM COATED ORAL at 22:01

## 2025-07-13 RX ADMIN — SODIUM CHLORIDE: 0.9 INJECTION, SOLUTION INTRAVENOUS at 22:41

## 2025-07-13 RX ADMIN — RIVAROXABAN 20 MG: 10 TABLET, FILM COATED ORAL at 08:42

## 2025-07-13 RX ADMIN — EMPAGLIFLOZIN 10 MG: 10 TABLET, FILM COATED ORAL at 08:42

## 2025-07-13 ASSESSMENT — PAIN SCALES - GENERAL: PAINLEVEL_OUTOF10: 0

## 2025-07-13 NOTE — H&P
Tenderness: There is no abdominal tenderness. There is no guarding.   Musculoskeletal:      Right lower leg: Edema present.      Left lower leg: Edema present.      Comments: Chronic nonpitting lymphedema   Skin:     General: Skin is warm and dry.      Capillary Refill: Capillary refill takes less than 2 seconds.   Neurological:      Mental Status: She is alert and oriented to person, place, and time. Mental status is at baseline.   Psychiatric:         Mood and Affect: Mood normal.         Behavior: Behavior normal.         Thought Content: Thought content normal.         Judgment: Judgment normal.         Investigations:      Laboratory Testing:  Recent Results (from the past 24 hours)   APTT    Collection Time: 07/12/25 10:22 PM   Result Value Ref Range    APTT 30.2 24.0 - 36.0 sec   Basic Metabolic Panel    Collection Time: 07/12/25 10:22 PM   Result Value Ref Range    Sodium 140 136 - 145 mmol/L    Potassium 3.6 (L) 3.7 - 5.3 mmol/L    Chloride 98 98 - 107 mmol/L    CO2 24 20 - 31 mmol/L    Anion Gap 18 (H) 9 - 16 mmol/L    Glucose 158 (H) 74 - 99 mg/dL    BUN 35 (H) 8 - 23 mg/dL    Creatinine 2.5 (H) 0.6 - 0.9 mg/dL    Est, Glom Filt Rate 20 (L) >60 mL/min/1.73m2    Calcium 8.1 (L) 8.6 - 10.4 mg/dL   CBC with Auto Differential    Collection Time: 07/12/25 10:22 PM   Result Value Ref Range    WBC 13.0 (H) 3.5 - 11.0 k/uL    RBC 4.12 4.0 - 5.2 m/uL    Hemoglobin 11.8 (L) 12.0 - 16.0 g/dL    Hematocrit 36.4 36 - 46 %    MCV 88.4 80 - 100 fL    MCH 28.6 26 - 34 pg    MCHC 32.4 31 - 37 g/dL    RDW 18.2 (H) 12.5 - 15.4 %    Platelets 226 140 - 450 k/uL    MPV 9.2 6.0 - 12.0 fL    Neutrophils % 78 (H) 36 - 66 %    Lymphocytes % 15 (L) 24 - 44 %    Monocytes % 5 2 - 11 %    Eosinophils % 1 1 - 4 %    Basophils % 1 0 - 2 %    Neutrophils Absolute 10.30 (H) 1.8 - 7.7 k/uL    Lymphocytes Absolute 1.90 1.0 - 4.8 k/uL    Monocytes Absolute 0.60 0.1 - 1.2 k/uL    Eosinophils Absolute 0.10 0.0 - 0.4 k/uL    Basophils  Absolute 0.10 0.0 - 0.2 k/uL   Troponin    Collection Time: 07/12/25 10:22 PM   Result Value Ref Range    Troponin, High Sensitivity 99 (HH) 0 - 14 ng/L   Protime-INR    Collection Time: 07/12/25 10:22 PM   Result Value Ref Range    Protime 19.0 (H) 11.8 - 14.6 sec    INR 1.5 (H) 0.9 - 1.2   Magnesium    Collection Time: 07/12/25 10:22 PM   Result Value Ref Range    Magnesium 0.8 (LL) 1.6 - 2.4 mg/dL   Lipase    Collection Time: 07/12/25 10:22 PM   Result Value Ref Range    Lipase 53 13 - 60 U/L   Hepatic Function Panel    Collection Time: 07/12/25 10:22 PM   Result Value Ref Range    Albumin 3.3 (L) 3.5 - 5.2 g/dL    Alkaline Phosphatase 69 35 - 104 U/L    ALT 10 10 - 35 U/L    AST 19 10 - 35 U/L    Total Bilirubin 1.0 0.0 - 1.2 mg/dL    Bilirubin, Direct 0.4 (H) 0.0 - 0.2 mg/dL    Bilirubin, Indirect 0.6 0.0 - 1.0 mg/dL    Total Protein 6.6 6.6 - 8.7 g/dL    Albumin/Globulin Ratio 1.0 1.0 - 2.5   Lactate, Sepsis    Collection Time: 07/12/25 10:22 PM   Result Value Ref Range    Lactic Acid, Sepsis 4.3 (H) 0.5 - 1.9 mmol/L   Troponin    Collection Time: 07/13/25 12:36 AM   Result Value Ref Range    Troponin, High Sensitivity 91 (HH) 0 - 14 ng/L   Lactate, Sepsis    Collection Time: 07/13/25 12:36 AM   Result Value Ref Range    Lactic Acid, Sepsis 2.1 (H) 0.5 - 1.9 mmol/L       Imaging/Diagnostics:    XR CHEST PORTABLE  Result Date: 7/13/2025  No acute cardiopulmonary process       Assessment :      Hospital Problems           Last Modified POA    * (Principal) Sepsis ([facility]) 7/13/2025 Yes    Acute transverse myelitis in demyelinating disease of cnsl ([facility]) 7/13/2025 Yes    Controlled type 2 diabetes mellitus without complication, without long-term current use of insulin ([facility]) 7/13/2025 Yes    Essential hypertension 7/13/2025 Yes    Lymphedema of both lower extremities 7/13/2025 Yes    Chronic atrial fibrillation (HCC) 7/13/2025 Yes    Neurogenic bladder 7/13/2025 Yes    Paraplegia, complete (HCC) 7/13/2025 Yes        Plan:     Patient status inpatient in the Progressive Unit/Step down    Sepsis secondary to sacral wound infection  IV antibiotics with vancomycin, cefepime.  Follow cultures.  Consult to wound care  Continue IV fluids  Every 2 hours turns  Acute kidney injury  Avoid nephrotoxic agents.  IV fluids.  Monitor repeat labs for improvement  Hypomagnesemia  Replacement ordered in emergency department.  Monitor repeat labs for improvement.  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  Every 2 hours turns  Continue home Ditropan, pyridostigmine, Flexeril  Paroxysmal A-fib  Continue home Lopressor, Xarelto  Hypertension, hyperlipidemia  Continue home statin, Lopressor, Norvasc.  Holding home Cozaar, Lasix given GRACIELA noted above  Type 2 diabetes  Holding home Glucophage.  Insulin sliding scale, carb controlled diet.  Depression/anxiety  Continue home Cymbalta, Seroquel  Consult to case management    Consultations:   PHARMACY TO DOSE VANCOMYCIN    Patient is admitted as inpatient status because of co-morbidities listed above, severity of signs and symptoms as outlined, requirement for current medical therapies and most importantly because of direct risk to patient if care not provided in a hospital setting.  Expected length of stay > 48 hours.    On this date 7/13/2025 I have spent 22 minutes reviewing previous notes, test results and face to face with the patient discussing the diagnosis and importance of compliance with the treatment plan as well as documenting on the day of the visit. At least 50% of the time documented was spent with the patient to provide counseling and/or coordination of care.     BERNARD Carrera  7/13/2025  4:30 AM    Copy sent to Dr. Yost, Bjorn Church MD

## 2025-07-13 NOTE — CARE COORDINATION
Case Management Assessment  Initial Evaluation    Date/Time of Evaluation: 7/13/2025 1:54 PM  Assessment Completed by: Treva Gillespie    If patient is discharged prior to next notation, then this note serves as note for discharge by case management.    Patient Name: Claire Rubin                   YOB: 1955  Diagnosis: Generalized weakness [R53.1]  Wound of sacral region, initial encounter [S31.000A]  Sepsis (HCC) [A41.9]                   Date / Time: 7/12/2025 10:01 PM    Patient Admission Status: Inpatient   Readmission Risk (Low < 19, Mod (19-27), High > 27): Readmission Risk Score: 14.4    Current PCP: Bjorn Yost MD  PCP verified by CM? Yes    Chart Reviewed: Yes      History Provided by: Patient  Patient Orientation: Alert and Oriented    Patient Cognition: Alert    Hospitalization in the last 30 days (Readmission):  No    If yes, Readmission Assessment in CM Navigator will be completed.    Advance Directives:      Code Status: Full Code   Patient's Primary Decision Maker is: Legal Next of Kin (mamie Sheikh)    Primary Decision Maker: *HIPPA* NATE CHAPA - Child - [phone]    Discharge Planning:    Patient lives with: Children Type of Home: House  Primary Care Giver: Family  Patient Support Systems include: Children, Family Members   Current Financial resources: Medicare, Medicaid  Current community resources: None  Current services prior to admission: Durable Medical Equipment            Current DME: Hospital Bed, Wheelchair, Other (Comment) (sit to stand lift)            Type of Home Care services:  None    ADLS  Prior functional level: Assistance with the following:, Bathing, Dressing, Toileting, Mobility  Current functional level: Mobility, Toileting, Dressing, Bathing, Assistance with the following:    PT AM-PAC:   /24  OT AM-PAC:   /24    Family can provide assistance at DC: Yes  Would you like Case Management to discuss the discharge plan with any other family

## 2025-07-13 NOTE — PROGRESS NOTES
Occupational Therapy  Occupational Therapy Initial Evaluation  Facility/Department: [address]   Patient Name: Claire Rubin        MRN: [id number]    : 1955    Date of Service: 2025    Chief Complaint   Patient presents with    Fatigue     Reports generalized weakness     Past Medical History:  has a past medical history of 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  Past Surgical History:  has a past surgical history that includes Hysterectomy, total abdominal (); Cholecystectomy, open (); Appendectomy (); Femur fracture surgery (Left, 2018); and Ovary removal.    Discharge Recommendations  Discharge Recommendations: Patient would benefit from continued therapy after discharge  OT Equipment Recommendations  Other: resting hand splint for R UE night time wear    Assessment  Performance deficits / Impairments: Decreased functional mobility ;Decreased ADL status;Decreased balance  Assessment: Patient demonstrated decreased ADLs, balance and functional mobility following fatigue, generalized weakness, sepsis due to sacral wound. Prior to admit patient was able to assist with transfers using mechanical lift with AFOs on, UB bathing/dressing and grooming IND, complete simple snack/meal prep from wc level, and IND with electric wc maneuverability. At this time patient requires DEP x2 bed mob, sit balance at EOB MIN-MOD, MIN UB grooming. Patient would benefit from skilled OT services addressing above deficits while here at hospital and following discharge to maximize independence. Currently patient has not demonstrated ability to safe ly return to prior living arranagements.  Prognosis: Good;Fair  Decision Making: Medium Complexity  REQUIRES OT FOLLOW-UP: Yes  Activity Tolerance  Activity Tolerance: Patient limited by endurance  Safety Devices  Type of Devices: Call light within

## 2025-07-13 NOTE — PLAN OF CARE
Problem: Safety - Adult  Goal: Free from fall injury  Outcome: Progressing     Problem: Chronic Conditions and Co-morbidities  Goal: Patient's chronic conditions and co-morbidity symptoms are monitored and maintained or improved  Outcome: Progressing  Flowsheets (Taken 7/13/2025 0526 by Charito Jimenez, RN)  Care Plan - Patient's Chronic Conditions and Co-Morbidity Symptoms are Monitored and Maintained or Improved:   Monitor and assess patient's chronic conditions and comorbid symptoms for stability, deterioration, or improvement   Update acute care plan with appropriate goals if chronic or comorbid symptoms are exacerbated and prevent overall improvement and discharge   Collaborate with multidisciplinary team to address chronic and comorbid conditions and prevent exacerbation or deterioration     Problem: Discharge Planning  Goal: Discharge to home or other facility with appropriate resources  Outcome: Progressing  Flowsheets (Taken 7/13/2025 0526 by Charito Jimenez, RN)  Discharge to home or other facility with appropriate resources:   Identify barriers to discharge with patient and caregiver   Arrange for needed discharge resources and transportation as appropriate   Identify discharge learning needs (meds, wound care, etc)   Refer to discharge planning if patient needs post-hospital services based on physician order or complex needs related to functional status, cognitive ability or social support system     Problem: Skin/Tissue Integrity  Goal: Skin integrity remains intact  Description: 1.  Monitor for areas of redness and/or skin breakdown  2.  Assess vascular access sites hourly  3.  Every 4-6 hours minimum:  Change oxygen saturation probe site  4.  Every 4-6 hours:  If on nasal continuous positive airway pressure, respiratory therapy assess nares and determine need for appliance change or resting period  Outcome: Progressing     Problem: ABCDS Injury Assessment  Goal: Absence of physical injury  Outcome:

## 2025-07-13 NOTE — ED PROVIDER NOTES
[facility] Emergency Department  [address]  [facility] [address]  Phone: [phone]  Fax: [phone]      Patient Name:  Claire Rubin  Medical Record Number:  [id number]  YOB: 1955  Date of Service:  7/12/2025    CHIEF COMPLAINT:       Chief Complaint   Patient presents with    Fatigue     Reports generalized weakness       HISTORY OF PRESENT ILLNESS:   Claire Rubin is a 70 y.o. female who presents to our emergency department.  The patient presents to the ER complaining of generalized weakness and fatigue.  Patient was discharged from [facility] earlier today and was there previously secondary to an ulcer on her bottom as well as for respite from family.  Patient states that she was there for a couple weeks.  Patient presents to the ER secondary to weakness in her lower extremities.  Patient states that she wears braces on her lower extremities is usually able to assist with transferring and standing but patient was unable to do that today.  States that when she was at [facility] they did not do this with her and they did not do physical therapy.  Today EMS was called for a lift assist but they found that patient's blood pressure was low so they brought her to the ER for further evaluation treatment.  Patient denies any chest pain, shortness of breath, nausea, vomiting, fever, chills, diarrhea, constipation        REVIEW OF SYSTEMS:     Review of Systems   Constitutional:  Positive for fatigue. Negative for chills, diaphoresis and fever.   HENT:  Negative for drooling.    Eyes:  Negative for redness.   Respiratory:  Negative for cough, chest tightness and shortness of breath.    Cardiovascular:  Negative for chest pain and palpitations.   Gastrointestinal:  Negative for abdominal pain, constipation, diarrhea, nausea and vomiting.   Genitourinary:  Negative for dysuria and hematuria.   Musculoskeletal:  Negative for neck stiffness.   Skin:  Negative for rash.   Neurological:   Panel    EKG 12 Lead    Insert peripheral IV       MEDICATIONS ORDERED:   No orders of the defined types were placed in this encounter.      DIAGNOSTIC RESULTS:     DEPARTMENT VITAL SIGNS:   Temp: 97.7 °F (36.5 °C) BP: (!) 79/50 Pulse: 86 Respirations: 20 SpO2: 93 %    EKG   EKG: All EKG's are interpreted by the Emergency Department Physician in the absence of a cardiologist.  Please see ED course for EKG    EMERGENCY DEPARTMENT COURSE:     ED Course as of 07/13/25 0052   Sat Jul 12, 2025   2250 Patient does have a history of A-fib but EKG shows sinus rhythm with ventricular rate of 66  QRS of 106 QTc of 465.  No large ST elevation or ST depression.  No STEMI [AT]      ED Course User Index  [AT] ToMat DO       LABORATORY:     Results for orders placed or performed during the hospital encounter of 07/12/25   Culture, Blood 2    Specimen: Blood   Result Value Ref Range    Specimen Description .BLOOD     Special Requests LEFT ARM 12ML     Culture NO GROWTH <24 HRS    Culture, Blood 1    Specimen: Blood   Result Value Ref Range    Specimen Description .BLOOD     Special Requests RIGHT HAND 1ML     Culture NO GROWTH <24 HRS    APTT   Result Value Ref Range    APTT 30.2 24.0 - 36.0 sec   Basic Metabolic Panel   Result Value Ref Range    Sodium 140 136 - 145 mmol/L    Potassium 3.6 (L) 3.7 - 5.3 mmol/L    Chloride 98 98 - 107 mmol/L    CO2 24 20 - 31 mmol/L    Anion Gap 18 (H) 9 - 16 mmol/L    Glucose 158 (H) 74 - 99 mg/dL    BUN 35 (H) 8 - 23 mg/dL    Creatinine 2.5 (H) 0.6 - 0.9 mg/dL    Est, Glom Filt Rate 20 (L) >60 mL/min/1.73m2    Calcium 8.1 (L) 8.6 - 10.4 mg/dL   CBC with Auto Differential   Result Value Ref Range    WBC 13.0 (H) 3.5 - 11.0 k/uL    RBC 4.12 4.0 - 5.2 m/uL    Hemoglobin 11.8 (L) 12.0 - 16.0 g/dL    Hematocrit 36.4 36 - 46 %    MCV 88.4 80 - 100 fL    MCH 28.6 26 - 34 pg    MCHC 32.4 31 - 37 g/dL    RDW 18.2 (H) 12.5 - 15.4 %    Platelets 226 140 - 450 k/uL    MPV 9.2 6.0 - 12.0 fL     Neutrophils % 78 (H) 36 - 66 %    Lymphocytes % 15 (L) 24 - 44 %    Monocytes % 5 2 - 11 %    Eosinophils % 1 1 - 4 %    Basophils % 1 0 - 2 %    Neutrophils Absolute 10.30 (H) 1.8 - 7.7 k/uL    Lymphocytes Absolute 1.90 1.0 - 4.8 k/uL    Monocytes Absolute 0.60 0.1 - 1.2 k/uL    Eosinophils Absolute 0.10 0.0 - 0.4 k/uL    Basophils Absolute 0.10 0.0 - 0.2 k/uL   Troponin   Result Value Ref Range    Troponin, High Sensitivity 99 (HH) 0 - 14 ng/L   Troponin   Result Value Ref Range    Troponin, High Sensitivity 91 (HH) 0 - 14 ng/L   Protime-INR   Result Value Ref Range    Protime 19.0 (H) 11.8 - 14.6 sec    INR 1.5 (H) 0.9 - 1.2   Magnesium   Result Value Ref Range    Magnesium 0.8 (LL) 1.6 - 2.4 mg/dL   Urinalysis with Reflex to Culture    Specimen: Urine   Result Value Ref Range    Color, UA Yellow Yellow    Turbidity UA Cloudy (A) Clear    Glucose, Ur TRACE (A) NEGATIVE mg/dL    Bilirubin, Urine NEGATIVE NEGATIVE    Ketones, Urine NEGATIVE NEGATIVE mg/dL    Specific Gravity, UA 1.015 1.005 - 1.030    Urine Hgb MODERATE (A) NEGATIVE    pH, Urine 6.0 5.0 - 8.0    Protein, UA 1+ (A) NEGATIVE mg/dL    Urobilinogen, Urine Normal 0.0 - 1.0 EU/dL    Nitrite, Urine NEGATIVE NEGATIVE    Leukocyte Esterase, Urine LARGE (A) NEGATIVE   Lipase   Result Value Ref Range    Lipase 53 13 - 60 U/L   Hepatic Function Panel   Result Value Ref Range    Albumin 3.3 (L) 3.5 - 5.2 g/dL    Alkaline Phosphatase 69 35 - 104 U/L    ALT 10 10 - 35 U/L    AST 19 10 - 35 U/L    Total Bilirubin 1.0 0.0 - 1.2 mg/dL    Bilirubin, Direct 0.4 (H) 0.0 - 0.2 mg/dL    Bilirubin, Indirect 0.6 0.0 - 1.0 mg/dL    Total Protein 6.6 6.6 - 8.7 g/dL    Albumin/Globulin Ratio 1.0 1.0 - 2.5   Lactate, Sepsis   Result Value Ref Range    Lactic Acid, Sepsis 2.1 (H) 0.5 - 1.9 mmol/L   Lactate, Sepsis   Result Value Ref Range    Lactic Acid, Sepsis 4.3 (H) 0.5 - 1.9 mmol/L   Microscopic Urinalysis   Result Value Ref Range    WBC, UA 50  0 - 5 /HPF    RBC, UA

## 2025-07-13 NOTE — PROGRESS NOTES
Pharmacy Note     Renal Dose Adjustment    Claire Rubin is a 70 y.o. female. Pharmacist assessment of renally cleared medications.    Recent Labs     07/12/25 2222   BUN 35*       Recent Labs     07/12/25 2222   CREATININE 2.5*       Estimated Creatinine Clearance: 23 mL/min (A) (based on SCr of 2.5 mg/dL (H)).    Height:   Ht Readings from Last 1 Encounters:   07/13/25 1.6 m (5' 3\")     Weight:  Wt Readings from Last 1 Encounters:   07/13/25 89.5 kg (197 lb 5 oz)       The following medication dose has been adjusted based upon renal function per P&T Guidelines:             Cefepime 2g q8h has been adjusted to cefepime 1g q24h    Minal ValdiviaD, [facility]  7/13/2025 5:17 AM]

## 2025-07-13 NOTE — PROGRESS NOTES
Physical Therapy  Facility/Department: [address]   Physical Therapy Initial Evaluation    Patient Name: Claire Rubin        MRN: [id number]    : 1955    Date of Service: 2025    Chief Complaint   Patient presents with    Fatigue     Reports generalized weakness     Past Medical History:  has a past medical history of 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  Past Surgical History:  has a past surgical history that includes Hysterectomy, total abdominal (); Cholecystectomy, open (); Appendectomy (); Femur fracture surgery (Left, 2018); and Ovary removal.    Discharge Recommendations  Discharge Recommendations: Therapy recommended at discharge  PT Equipment Recommendations  Equipment Needed: No    Assessment  Body Structures, Functions, Activity Limitations Requiring Skilled Therapeutic Intervention: Decreased functional mobility , Decreased ADL status, Decreased ROM, Decreased strength, Decreased balance, Decreased posture    Assessment: Pt presents with fatigue, weakness, sepsis due to sacral wound. Pt recently at respite stay at [facility] since  and just released to home few days ago. At baseline, pt requires assist with mechanical stand lift into electric w/c, pt able to maneuver electric w/c around house for ind light meals, dishes and some laundry, requires assist for toileting, she performs assist with UE and trunk ADL's (family dependent with lower body). Pt currently requiring mod assist x 2 for R rolling, max x 2 left rolling, dependent x 2 supine<->sit, pt sat on side of bed for ~30' with min to mod assist for trunk control during UE tasks and LE stretching. Pt will require 24 hour assist at discharge. Pt will benefit from continued skilled PT for LE stretching, trunk strengthening and functional mobility training while in the hospital and at

## 2025-07-13 NOTE — ED NOTES
ED to inpatient nurses report      Chief Complaint:  Chief Complaint   Patient presents with    Fatigue     Reports generalized weakness     Present to ED from: home     MOA:     LOC: alert and orientated to name, place, date  Mobility: Fully dependent  Oxygen Baseline: none    Current needs required: none   Pending ED orders: urine - writer will straight cath before pt goes up  Present condition: stable      Why did the patient come to the ED? Generalized fatigue and weakness    What is the plan? admit    Any procedures or intervention occur? none    Pertinent event(s) none    Safety concerns??fall risk     CODE STATUS No Order    Diet No diet orders on file    Mental Status:  Level of Consciousness: Alert (0)    Psych Assessment:   Psychosocial  Psychosocial (WDL): Within Defined Limits  Vital signs   Vitals:    07/12/25 2303 07/12/25 2310 07/13/25 0210 07/13/25 0326   BP:  98/61 (!) 108/56 116/77   Pulse:  69 74 73   Resp:       Temp:       TempSrc:       SpO2:       Weight: 90 kg (198 lb 6.6 oz)           Vitals:  Patient Vitals for the past 24 hrs:   BP Temp Temp src Pulse Resp SpO2 Weight   07/13/25 0326 116/77 -- -- 73 -- -- --   07/13/25 0210 (!) 108/56 -- -- 74 -- -- --   07/12/25 2310 98/61 -- -- 69 -- -- --   07/12/25 2303 -- -- -- -- -- -- 90 kg (198 lb 6.6 oz)   07/12/25 2217 (!) 79/50 -- -- -- -- -- --   07/12/25 2205 -- 97.7 °F (36.5 °C) Oral -- -- -- --   07/12/25 2203 -- -- -- 86 20 93 % --      Visit Vitals  /77   Pulse 73   Temp 97.7 °F (36.5 °C) (Oral)   Resp 20   Wt 90 kg (198 lb 6.6 oz)   SpO2 93%   BMI 35.15 kg/m²        LDAs:   Peripheral IV 07/12/25 Right Antecubital (Active)       Meds:  Medications   vancomycin (VANCOCIN) 2,000 mg in sodium chloride 0.9 % 500 mL IVPB (2,000 mg IntraVENous New Bag 7/13/25 0226)   sodium chloride 0.9 % bolus 1,000 mL (0 mLs IntraVENous Stopped 7/13/25 0106)   magnesium sulfate 2000 mg in 50 mL IVPB premix (0 mg IntraVENous Stopped 7/13/25 0115)

## 2025-07-13 NOTE — PROGRESS NOTES
[facility]   Pharmacy Pharmacokinetic Monitoring Service - Vancomycin     Claire Rubin is a 70 y.o. female starting on vancomycin therapy for sepsis. Pharmacy consulted by BERNARD Chilel for monitoring and adjustment.    Target Concentration: Dosing based on anticipated concentration <15 mg/L due to renal impairment/insufficiency    Additional Antimicrobials: Cefepime    Pertinent Laboratory Values:   Wt Readings from Last 1 Encounters:   07/13/25 89.5 kg (197 lb 5 oz)     Temp Readings from Last 1 Encounters:   07/12/25 97.7 °F (36.5 °C) (Oral)     Estimated Creatinine Clearance: 23 mL/min (A) (based on SCr of 2.5 mg/dL (H)).  Recent Labs     07/12/25  2222   CREATININE 2.5*   BUN 35*   WBC 13.0*     Procalcitonin: n/a    Pertinent Cultures:  Culture Date Source Results        MRSA Nasal Swab: N/A. Non-respiratory infection.    Plan:  Concentration-guided dosing due to renal impairment/insufficiency  Received 2000mg dose in ED.  Renal labs as indicated   Vancomycin concentration ordered for 07/14 @ 0600   Pharmacy will continue to monitor patient and adjust therapy as indicated    Thank you for the consult,  Raphael Rodriguez RPH  7/13/2025 5:16 AM

## 2025-07-14 LAB
ANION GAP SERPL CALCULATED.3IONS-SCNC: 12 MMOL/L (ref 9–17)
BASOPHILS # BLD: 0 K/UL (ref 0–0.2)
BASOPHILS NFR BLD: 1 % (ref 0–2)
BUN SERPL-MCNC: 18 MG/DL (ref 8–23)
CALCIUM SERPL-MCNC: 7.7 MG/DL (ref 8.6–10.4)
CHLORIDE SERPL-SCNC: 109 MMOL/L (ref 98–107)
CO2 SERPL-SCNC: 24 MMOL/L (ref 20–31)
CREAT SERPL-MCNC: 0.9 MG/DL (ref 0.5–0.9)
EOSINOPHIL # BLD: 0.2 K/UL (ref 0–0.4)
EOSINOPHILS RELATIVE PERCENT: 2 % (ref 1–4)
ERYTHROCYTE [DISTWIDTH] IN BLOOD BY AUTOMATED COUNT: 18 % (ref 12.5–15.4)
EST. AVERAGE GLUCOSE BLD GHB EST-MCNC: 131 MG/DL
GFR, ESTIMATED: 70 ML/MIN/1.73M2
GLUCOSE BLD-MCNC: 131 MG/DL (ref 65–105)
GLUCOSE BLD-MCNC: 139 MG/DL (ref 65–105)
GLUCOSE BLD-MCNC: 85 MG/DL (ref 65–105)
GLUCOSE SERPL-MCNC: 89 MG/DL (ref 70–99)
HBA1C MFR BLD: 6.2 % (ref 4–6)
HCT VFR BLD AUTO: 35.2 % (ref 36–46)
HGB BLD-MCNC: 11.4 G/DL (ref 12–16)
LYMPHOCYTES NFR BLD: 2.1 K/UL (ref 1–4.8)
LYMPHOCYTES RELATIVE PERCENT: 28 % (ref 24–44)
MAGNESIUM SERPL-MCNC: 1.3 MG/DL (ref 1.6–2.4)
MCH RBC QN AUTO: 28.6 PG (ref 26–34)
MCHC RBC AUTO-ENTMCNC: 32.4 G/DL (ref 31–37)
MCV RBC AUTO: 88.1 FL (ref 80–100)
MONOCYTES NFR BLD: 0.3 K/UL (ref 0.1–1.2)
MONOCYTES NFR BLD: 4 % (ref 2–11)
NEUTROPHILS NFR BLD: 65 % (ref 36–66)
NEUTS SEG NFR BLD: 5 K/UL (ref 1.8–7.7)
PLATELET # BLD AUTO: 194 K/UL (ref 140–450)
PMV BLD AUTO: 9 FL (ref 6–12)
POTASSIUM SERPL-SCNC: 3.7 MMOL/L (ref 3.7–5.3)
RBC # BLD AUTO: 4 M/UL (ref 4–5.2)
SODIUM SERPL-SCNC: 145 MMOL/L (ref 135–144)
VANCOMYCIN SERPL-MCNC: 11.5 UG/ML
WBC OTHER # BLD: 7.7 K/UL (ref 3.5–11)

## 2025-07-14 PROCEDURE — 2580000003 HC RX 258

## 2025-07-14 PROCEDURE — 6360000002 HC RX W HCPCS

## 2025-07-14 PROCEDURE — 97530 THERAPEUTIC ACTIVITIES: CPT

## 2025-07-14 PROCEDURE — 83036 HEMOGLOBIN GLYCOSYLATED A1C: CPT

## 2025-07-14 PROCEDURE — 36415 COLL VENOUS BLD VENIPUNCTURE: CPT

## 2025-07-14 PROCEDURE — 6370000000 HC RX 637 (ALT 250 FOR IP): Performed by: SURGERY

## 2025-07-14 PROCEDURE — 80202 ASSAY OF VANCOMYCIN: CPT

## 2025-07-14 PROCEDURE — 99232 SBSQ HOSP IP/OBS MODERATE 35: CPT | Performed by: STUDENT IN AN ORGANIZED HEALTH CARE EDUCATION/TRAINING PROGRAM

## 2025-07-14 PROCEDURE — 80048 BASIC METABOLIC PNL TOTAL CA: CPT

## 2025-07-14 PROCEDURE — 83735 ASSAY OF MAGNESIUM: CPT

## 2025-07-14 PROCEDURE — 6360000002 HC RX W HCPCS: Performed by: STUDENT IN AN ORGANIZED HEALTH CARE EDUCATION/TRAINING PROGRAM

## 2025-07-14 PROCEDURE — 82947 ASSAY GLUCOSE BLOOD QUANT: CPT

## 2025-07-14 PROCEDURE — 2500000003 HC RX 250 WO HCPCS

## 2025-07-14 PROCEDURE — 2060000000 HC ICU INTERMEDIATE R&B

## 2025-07-14 PROCEDURE — 6370000000 HC RX 637 (ALT 250 FOR IP)

## 2025-07-14 PROCEDURE — 97110 THERAPEUTIC EXERCISES: CPT

## 2025-07-14 PROCEDURE — 85025 COMPLETE CBC W/AUTO DIFF WBC: CPT

## 2025-07-14 PROCEDURE — 97535 SELF CARE MNGMENT TRAINING: CPT

## 2025-07-14 PROCEDURE — 99213 OFFICE O/P EST LOW 20 MIN: CPT

## 2025-07-14 RX ORDER — MULTIVITAMIN WITH IRON
1 TABLET ORAL DAILY
Status: DISCONTINUED | OUTPATIENT
Start: 2025-07-14 | End: 2025-07-25 | Stop reason: HOSPADM

## 2025-07-14 RX ORDER — MAGNESIUM SULFATE IN WATER 40 MG/ML
2000 INJECTION, SOLUTION INTRAVENOUS ONCE
Status: COMPLETED | OUTPATIENT
Start: 2025-07-14 | End: 2025-07-14

## 2025-07-14 RX ADMIN — ATORVASTATIN CALCIUM 20 MG: 20 TABLET, FILM COATED ORAL at 21:14

## 2025-07-14 RX ADMIN — CEFEPIME 2000 MG: 2 INJECTION, POWDER, FOR SOLUTION INTRAVENOUS at 17:43

## 2025-07-14 RX ADMIN — EMPAGLIFLOZIN 10 MG: 10 TABLET, FILM COATED ORAL at 09:47

## 2025-07-14 RX ADMIN — SODIUM CHLORIDE 1250 MG: 9 INJECTION, SOLUTION INTRAVENOUS at 09:51

## 2025-07-14 RX ADMIN — DULOXETINE 30 MG: 30 CAPSULE, DELAYED RELEASE ORAL at 09:48

## 2025-07-14 RX ADMIN — PANTOPRAZOLE SODIUM 40 MG: 40 TABLET, DELAYED RELEASE ORAL at 06:37

## 2025-07-14 RX ADMIN — MAGNESIUM SULFATE HEPTAHYDRATE 2000 MG: 40 INJECTION, SOLUTION INTRAVENOUS at 13:31

## 2025-07-14 RX ADMIN — THERA TABS 1 TABLET: TAB at 17:42

## 2025-07-14 RX ADMIN — RIVAROXABAN 20 MG: 10 TABLET, FILM COATED ORAL at 09:48

## 2025-07-14 RX ADMIN — SODIUM CHLORIDE, PRESERVATIVE FREE 10 ML: 5 INJECTION INTRAVENOUS at 21:14

## 2025-07-14 RX ADMIN — PYRIDOSTIGMINE BROMIDE 60 MG: 60 TABLET ORAL at 09:47

## 2025-07-14 RX ADMIN — OXYBUTYNIN CHLORIDE 10 MG: 5 TABLET, EXTENDED RELEASE ORAL at 21:14

## 2025-07-14 RX ADMIN — CEFEPIME 1000 MG: 1 INJECTION, POWDER, FOR SOLUTION INTRAMUSCULAR; INTRAVENOUS at 06:37

## 2025-07-14 ASSESSMENT — PAIN SCALES - GENERAL
PAINLEVEL_OUTOF10: 0
PAINLEVEL_OUTOF10: 0

## 2025-07-14 NOTE — PROGRESS NOTES
Occupational Therapy  Occupational Therapy Daily Treatment Note  Facility/Department: [address]   Patient Name: Claire Rubin        MRN: [id number]    : 1955    Date of Service: 2025    Chief Complaint   Patient presents with    Fatigue     Reports generalized weakness     Past Medical History:  has a past medical history of 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  Past Surgical History:  has a past surgical history that includes Hysterectomy, total abdominal (); Cholecystectomy, open (); Appendectomy (); Femur fracture surgery (Left, 2018); and Ovary removal.    Discharge Recommendations  Discharge Recommendations: Patient would benefit from continued therapy after discharge  OT Equipment Recommendations  Other: resting hand splint for R UE night time wear    Assessment  Performance deficits / Impairments: Decreased functional mobility ;Decreased ADL status;Decreased balance  Assessment: Patient continues to demonstrate above deficits. Prior to admit patient was able to assist with transfers using mechanical lift with AFOs on, UB bathing/dressing and grooming IND, complete simple snack/meal prep from wc level, and IND with electric wc maneuverability. At this time patient requires DEP x2 bed mob, sit balance at EOB CGA-MIN, CGA-MIN UB grooming. Patient would benefit from skilled OT services addressing above deficits while here at hospital and following discharge to maximize independence. Currently patient has not demonstrated ability to safe ly return to prior living arranagements.  Prognosis: Good;Fair  REQUIRES OT FOLLOW-UP: Yes  Activity Tolerance  Activity Tolerance: Patient tolerated treatment well  Safety Devices  Type of Devices: Call light within reach, Nurse notified, Left in bed, Bed alarm in place, Patient at risk for falls (Pt left supine in bed with wound RN
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
## 2025-07-14 NOTE — PROGRESS NOTES
Physical Therapy  Facility/Department: [address]   Physical Therapy Daily Treatment Note    Patient Name: Claire Rubin        MRN: [id number]    : 1955    Date of Service: 2025    Chief Complaint   Patient presents with    Fatigue     Reports generalized weakness     Past Medical History:  has a past medical history of 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  Past Surgical History:  has a past surgical history that includes Hysterectomy, total abdominal (); Cholecystectomy, open (); Appendectomy (); Femur fracture surgery (Left, 2018); and Ovary removal.    Discharge Recommendations  Discharge Recommendations: Therapy recommended at discharge       Assessment  Body Structures, Functions, Activity Limitations Requiring Skilled Therapeutic Intervention: Decreased functional mobility ;Decreased ADL status;Decreased ROM;Decreased strength;Decreased balance;Decreased posture    Assessment: Pt presents with fatigue, weakness, sepsis due to sacral wound. Pt recently at respite stay at [facility] since  and just released to home few days ago. At baseline, pt requires assist with mechanical stand lift into electric w/c, pt able to maneuver electric w/c around house for ind light meals, dishes and some laundry, requires assist for toileting, she performs assist with UE and trunk ADL's (family dependent with lower body). Pt currently requiring max x 2 for gaudencio rolling, dependent x 2 supine<->sit, pt sat on side of bed with CGA static balance and min assist x 1 dynamic balance. Performed supine LE ROM stretching and seated thoracic stretching with postural cues.  Pt will require 24 hour assist at discharge. Pt will benefit from continued skilled PT for LE stretching, trunk strengthening and functional mobility training while in the hospital and at discharge.    Requires

## 2025-07-14 NOTE — PROGRESS NOTES
[facility]   Pharmacy Pharmacokinetic Monitoring Service - Vancomycin    Consulting Provider: PARRIS Ireland   Indication: sepsis  Target Concentration: Goal AUC/WENDIE 400-600 mg*hr/L; Patient did have GRACIELA but has returned to baseline/near baseline renal function  Day of Therapy: 2  Additional Antimicrobials: cefepime    Pertinent Laboratory Values:   Wt Readings from Last 1 Encounters:   07/14/25 93.1 kg (205 lb 4 oz)     Temp Readings from Last 1 Encounters:   07/13/25 99 °F (37.2 °C) (Oral)     Estimated Creatinine Clearance: 65 mL/min (based on SCr of 0.9 mg/dL).  Recent Labs     07/12/25  2222 07/13/25  0522 07/14/25  0431   CREATININE 2.5* 2.2* 0.9   BUN 35* 34* 18   WBC 13.0*  --  7.7     Procalcitonin:     Pertinent Cultures:  Culture Date Source Results        MRSA Nasal Swab: N/A. Non-respiratory infection.    Recent vancomycin administrations                     vancomycin (VANCOCIN) 2,000 mg in sodium chloride 0.9 % 500 mL IVPB (mg) 2,000 mg New Bag 07/13/25 0226                    Assessment:  Date/Time Current Dose Concentration Timing of Concentration (h) AUC   7/14/25 04:31 2000 mg x1 11.5 26 hours post dose ----   Note: Serum concentrations collected for AUC dosing may appear elevated if collected in close proximity to the dose administered, this is not necessarily an indication of toxicity    Plan:  Current dosing regimen is therapeutic; patient did have GRACIELA but has returned to baseline renal function.   Begin vanco 1250 mg every 24 hours  Repeat vancomycin concentration ordered for TBD @ TBD   Pharmacy will continue to monitor patient and adjust therapy as indicated    Thank you for the consult,  SID MONTES [facility]  7/14/2025 8:05 AM

## 2025-07-14 NOTE — PROGRESS NOTES
7/30/2018      Arianna Herreraon  [address] [address]        Dear Ms. Debora,    Please carefully read through the enclosed prep instructions at least 7  days prior to your procedure with Dr. Deejay Hoover.  If you have questions regarding the instructions, please call [phone].    Procedure and arrival times may occasionally change.  Dependent on the facility where your procedure is scheduled, you may receive a phone call 3-7 days prior to confirm those times.    Date:  September 5, 2018  Procedure Time:  Someone from the [facility] will call you 5-7days prior to your procedure with an arrival time.    Location:    [address] [place] [place]  [phone]        If you need to cancel or reschedule your procedure, please contact the  at the number listed below.    Thank you,    Mahnaz [phone]  Surgery Scheduler  Pre-Admit Department                      Colonoscopy with Split Dosing of Nulytely and Dulcolax  Pre-Procedure Instructions              THINGS TO DO IN THE 2 WEEKS BEFORE YOUR COLONOSCOPY:    If you are taking the diet drug, Phentermine, you must stop taking this drug 14 days prior to the procedure.     Please pick-up a Nulytely Prep Kit at the pharmacy your physician sent the prescription to [facility] on [address].  Do not mix the solution until the day before the procedure.  Bring these instructions with you when you go to the pharmacy.    Purchase Dulcolax laxative tablets at any pharmacy.  This does not require a prescription.  Purchase a small box as you will only need two (2) tablets.    SEVEN (7) DAYS BEFORE THE PROCEDURE:  Do not take any Plavix, Brillinta, Effient, Aggrenox, Pepto-Bismol or iron supplements. If you have any questions or concerns about holding any of these medications, please contact your cardiologist or primary care physician.    Do not eat  [facility]   IN-PATIENT SERVICE      Progress Note    7/14/2025    11:24 AM    Name:   Claire Rubin  MRN:     [id number]     Acct:      [id number]   Room:   [address] [place] [address] Day:  1  Admit Date:  7/12/2025 10:01 PM    PCP:   Bjorn Yost MD  Code Status:  Full Code    Subjective:     C/C:   Chief Complaint   Patient presents with    Fatigue     Reports generalized weakness     Interval History Status: not changed.     Pt would benefit from SNF- she's discussing with Daughter  Consulted genSx for decub ulcer  Pending ct abd/pelvis    She uses a electric wheelchair at baseline     Brief History:     70 y.o. female is admitted for management of sepsis secondary to sacral wound.  She states she has had increased fatigue, worsening pain in her sacral wound, and generalized weakness and lightheadedness for the past several days.  She is a paraplegic and is bed bound at baseline.  She recently was discharged from [facility] yesterday morning and is typically cared for by daughter at home.  They do not have home health.  Denies any fevers, chills, chest pain, shortness of breath, abdominal pain, nausea, vomiting, dysuria, hematuria, change in bowel habits.       Review of Systems:     Constitutional:  negative for chills, fevers, sweats  Respiratory:  negative for cough, dyspnea on exertion, shortness of breath, wheezing  Cardiovascular:  negative for chest pain, chest pressure/discomfort, lower extremity edema, palpitations  Gastrointestinal:  negative for abdominal pain, constipation, diarrhea, nausea, vomiting  Neurological:  negative for dizziness, headache    Medications:     Allergies:    Allergies   Allergen Reactions    Compazine [Prochlorperazine]      Cause neurological sx    Sulfa Antibiotics      Childhood allergy       Current Meds:   Scheduled Meds:    cefepime  2,000 mg IntraVENous Q12H    vancomycin  1,250 mg IntraVENous Q24H    [Held by provider] amLODIPine  5 mg Oral  popcorn, seeds, nuts or whole kernel corn.     FIVE (5) DAYS BEFORE THE PROCEDURE:  Do not eat products with [place] (a fat substitute found in [employer] and [employer] Fat-Free chips) for 5 days before the procedure.    THREE (3) DAYS BEFORE THE PROCEDURE:  Do not take any Coumadin (warfarin) or Pradaxa. Please contact your cardiologist or primary care physician for any questions or concerns regarding holding this medication.   Please contact your primary care physician if you take insulin for diabetes.    Do not eat corn-based foods for 48 hours before the procedure.    The day prior to the procedure, you will need to have some clear liquids (without red or purple coloring) available to drink or eat. Clear liquids (that you can see through) include water, coffee or tea without creamer, Gatorade/clear sports drinks, Popsicles, carbonated or non-carbonated soft drinks, Randy-aid or other flavored drinks, plain Jell-O without fruit or toppings, hard candy and broth. You may also have fruit juices that are clear such as apple or white grape. Do not have any juice that the fruit has pulp such as orange, pineapple or prunes.    Make arrangements for someone to drive you home after the procedure because you will be very drowsy. Please make these arrangements in advance. A taxi is not acceptable transportation. If you do not have transportation arranged, we will not be able to do the colonoscopy.    Make arrangements for someone to stay with you or check in on you frequently the rest of the day/evening until the drowsiness has completely worn off.      Due to everyone's busy schedules, it is important that you keep your appointment. If you must reschedule or cancel your appointment, please notify your physician's office at least 48 hours in advance.          DAY BEFORE YOUR COLONOSCOPY:    · You may have only clear liquids to eat or drink all day long. You may not have any solid foods or dairy products, and you  may not eat or drink anything that is red or purple. The more clear liquids you drink, the better off you will be.    · Do not drink any alcoholic beverages for at least 24 hours before the procedure.    · Do not take Lomotil, Imodium, Effer-syllium, Metamucil, Citrucel, Konsyl, Hydrocil, or FiberCon.    · In the morning, mix the Nulytely prep with the flavor packet and one gallon of water, shake well to mix, and refrigerate to chill. Do not further dilute the prep in any way.     · At 4:00pm, start taking the prep.  · Drink one large (8-10 oz.) glass every 10-15 minutes. In most cases, loose, liquidy bowel movements will begin within 30 minutes to one hour. You should remain within easy access of a bathroom. Continue to drink the prep regardless of stool frequency.   · You will be drinking until 1/2 of the gallon is gone.    · The remaining half gallon should be refrigerated. You will complete this the morning of your procedure.    · Continue to drink clear liquids (your physician recommends 64 ounces of Gatorade) throughout the evening.    ·  At 7 pm take 2 Dulcolax laxative tablets    · Do not eat or drink anything after midnight except for the remaining Nulyte prep..    · ON THE DAY OF YOUR COLONOSCOPY, starting 4 hours prior to leaving your house for your procedure, drink the second half of Nulyte prep. Drink one large (8-10 oz.) glass every 10-15 minutes until all the prep has been consumed. Do not eat or drink anything further until after the procedure.    · Your stools should have a clear to see-through cloudy yellow appearance with no formed substance. If your stools are darker or have formed substance, please call the location where your procedure is scheduled to be done and ask for the pre-op nurse, who will get further instructions from your physician    DAY OF YOUR COLONOSCOPY:    · Take your heart and/or blood pressure medications with a small sip of water. Do not take any other medications until after  the procedure. This includes any blood-thinning or platelet-modifying medications. Examples of such medications include, but are not limited to, Aspirin, Motrin, Ibuprofen, Advil, Aleve, Coumadin, Plavix, and Aggrenox.                    AFTER YOUR COLONOSCOPY:  You will receive after-procedure information and instructions. In addition:    · Do not plan on going to work or doing anything for the rest of the day.    · You may resume eating and drinking with no limitations following the procedure.      Please call your physician's office at [phone] if you have any further questions or if you need additional care.    Thank you for entrusting your care to [facility].

## 2025-07-14 NOTE — PROGRESS NOTES
[facility]  Consult Note       NAME:  Claire Rubin  MEDICAL RECORD NUMBER:  [id number]  AGE: 70 y.o.   GENDER: female  : 1955  TODAY'S DATE:  2025    Subjective:      Claire Rubin is a 70 y.o. female with inpatient referral to [facility] for:  Wound, sacrum\"      Wound Identification:  Wound Type: pressure  Contributing Factors: chronic pressure, decreased mobility, and obesity    Wound History: patient with wound to coccyx - [facility]. Patient presents to ED with c/o pain to pressure wound and fatigue. Patient has history of paraplegia from transverse myelitis and is wheel chair/bed bound at baseline, reports that she is in her chair from 8am - 9pm. Endorses that she has chair cushion for wheelchair and that her wheelchair does recline.   Attending service has placed general surgery consult - await eval  Current Wound Care Treatment:  foam dressing    Patient Goal of Care:  [x] Wound Healing  [] Odor Control  [] Palliative Care  [] Pain Control   [] Other:         PAST MEDICAL HISTORY        Diagnosis Date    Acute transverse myelitis in demyelinating disease of central nervous system (HCC)     Atrial fibrillation (HCC)     Depression     Hyperlipidemia     Hypertension        PAST SURGICAL HISTORY    Past Surgical History:   Procedure Laterality Date    APPENDECTOMY      CHOLECYSTECTOMY, OPEN      FEMUR FRACTURE SURGERY Left 2018    HYSTERECTOMY, TOTAL ABDOMINAL (CERVIX REMOVED)      OVARY REMOVAL         FAMILY HISTORY    History reviewed. No pertinent family history.    SOCIAL HISTORY    Social History     Tobacco Use    Smoking status: Never    Smokeless tobacco: Never   Substance Use Topics    Alcohol use: No    Drug use: No         ALLERGIES    Allergies   Allergen Reactions    Compazine [Prochlorperazine]      Cause neurological sx    Sulfa Antibiotics      Childhood allergy       HOME  07/14/2025 04:31 AM     SED RATE: No results found for: \"SEDRATE\"    CMP:  Albumin:  No results found for: \"LABALBU\"  PT/INR:    Lab Results   Component Value Date/Time    PROTIME 19.0 07/12/2025 10:22 PM    PROTIME 25.4 10/27/2011 07:26 AM    INR 1.5 07/12/2025 10:22 PM     HgBA1c:    Lab Results   Component Value Date/Time    LABA1C 6.2 07/14/2025 04:31 AM     PTT: No components found for: \"LABPTT\"      Assessment:       Terrell Risk Score: Terrell Scale Score: 15    Patient Active Problem List   Diagnosis Code    Controlled type 2 diabetes mellitus without complication, without long-term current use of insulin ([facility]) E11.9    Essential hypertension I10    Transverse myelitis ([facility]) G37.3    Gastroesophageal reflux disease without esophagitis K21.9    Lymphedema of both lower extremities I89.0    Chronic atrial fibrillation ([facility]) I48.20    Neurogenic bladder N31.9    Paraplegia, complete ([facility]) G82.21    Lymphedema, not elsewhere classified I89.0    Acute transverse myelitis in demyelinating disease of cnsl ([facility]) G37.3    Sepsis ([facility]) A41.9    Generalized weakness R53.1         Measurements:  Wound Care Documentation:  Wound 07/14/25 Coccyx (Active)   Wound Image   07/14/25 1422   Wound Etiology Pressure Unstageable 07/14/25 1422   Dressing Status New dressing applied 07/14/25 1422   Wound Cleansed Cleansed with saline 07/14/25 1422   Dressing/Treatment Moist to dry 07/14/25 1422   Dressing Change Due 07/15/25 07/14/25 1422   Wound Length (cm) 5 cm 07/14/25 1422   Wound Width (cm) 7.1 cm 07/14/25 1422   Wound Depth (cm) 0.6 cm 07/14/25 1422   Wound Surface Area (cm^2) 35.5 cm^2 07/14/25 1422   Wound Volume (cm^3) 21.3 cm^3 07/14/25 1422   Wound Assessment Devitalized tissue;Slough 07/14/25 1422   Drainage Amount Small (< 25%) 07/14/25 1422   Drainage Description Serosanguinous 07/14/25 1422   Odor None 07/14/25 1422   Mari-wound Assessment Warm;Blanchable erythema 07/14/25 1422   Margins Defined edges 07/14/25 1420

## 2025-07-14 NOTE — PLAN OF CARE
Problem: Safety - Adult  Goal: Free from fall injury  7/14/2025 1942 by Sangita Read RN  Outcome: Progressing  7/14/2025 0635 by Katie Mathew RN  Outcome: Progressing     Problem: Chronic Conditions and Co-morbidities  Goal: Patient's chronic conditions and co-morbidity symptoms are monitored and maintained or improved  7/14/2025 1942 by Sangita Read RN  Outcome: Progressing  Flowsheets  Taken 7/14/2025 1600  Care Plan - Patient's Chronic Conditions and Co-Morbidity Symptoms are Monitored and Maintained or Improved: Monitor and assess patient's chronic conditions and comorbid symptoms for stability, deterioration, or improvement  Taken 7/14/2025 1200  Care Plan - Patient's Chronic Conditions and Co-Morbidity Symptoms are Monitored and Maintained or Improved: Monitor and assess patient's chronic conditions and comorbid symptoms for stability, deterioration, or improvement  Taken 7/14/2025 0830  Care Plan - Patient's Chronic Conditions and Co-Morbidity Symptoms are Monitored and Maintained or Improved: Monitor and assess patient's chronic conditions and comorbid symptoms for stability, deterioration, or improvement  7/14/2025 0635 by Katie Mathew RN  Outcome: Progressing     Problem: Discharge Planning  Goal: Discharge to home or other facility with appropriate resources  7/14/2025 1942 by Sangita Read RN  Outcome: Progressing  Flowsheets  Taken 7/14/2025 1600  Discharge to home or other facility with appropriate resources:   Identify discharge learning needs (meds, wound care, etc)   Arrange for needed discharge resources and transportation as appropriate   Identify barriers to discharge with patient and caregiver  Taken 7/14/2025 1200  Discharge to home or other facility with appropriate resources:   Identify barriers to discharge with patient and caregiver   Arrange for needed discharge resources and transportation as appropriate   Identify discharge learning needs (meds, wound care, etc)  Taken

## 2025-07-15 LAB
ANION GAP SERPL CALCULATED.3IONS-SCNC: 10 MMOL/L (ref 9–16)
BASOPHILS # BLD: 0 K/UL (ref 0–0.2)
BASOPHILS NFR BLD: 1 % (ref 0–2)
BUN SERPL-MCNC: 8 MG/DL (ref 8–23)
CALCIUM SERPL-MCNC: 8 MG/DL (ref 8.6–10.4)
CHLORIDE SERPL-SCNC: 108 MMOL/L (ref 98–107)
CO2 SERPL-SCNC: 25 MMOL/L (ref 20–31)
CREAT SERPL-MCNC: 0.6 MG/DL (ref 0.6–0.9)
EOSINOPHIL # BLD: 0.2 K/UL (ref 0–0.4)
EOSINOPHILS RELATIVE PERCENT: 3 % (ref 1–4)
ERYTHROCYTE [DISTWIDTH] IN BLOOD BY AUTOMATED COUNT: 18 % (ref 12.5–15.4)
GFR, ESTIMATED: >90 ML/MIN/1.73M2
GLUCOSE BLD-MCNC: 117 MG/DL (ref 65–105)
GLUCOSE BLD-MCNC: 129 MG/DL (ref 65–105)
GLUCOSE BLD-MCNC: 213 MG/DL (ref 65–105)
GLUCOSE SERPL-MCNC: 92 MG/DL (ref 74–99)
HCT VFR BLD AUTO: 34.3 % (ref 36–46)
HGB BLD-MCNC: 11.1 G/DL (ref 12–16)
LYMPHOCYTES NFR BLD: 2.1 K/UL (ref 1–4.8)
LYMPHOCYTES RELATIVE PERCENT: 32 % (ref 24–44)
MAGNESIUM SERPL-MCNC: 1.5 MG/DL (ref 1.6–2.4)
MCH RBC QN AUTO: 28.8 PG (ref 26–34)
MCHC RBC AUTO-ENTMCNC: 32.4 G/DL (ref 31–37)
MCV RBC AUTO: 88.8 FL (ref 80–100)
MICROORGANISM SPEC CULT: ABNORMAL
MONOCYTES NFR BLD: 0.4 K/UL (ref 0.1–1.2)
MONOCYTES NFR BLD: 6 % (ref 2–11)
NEUTROPHILS NFR BLD: 58 % (ref 36–66)
NEUTS SEG NFR BLD: 3.8 K/UL (ref 1.8–7.7)
PLATELET # BLD AUTO: 202 K/UL (ref 140–450)
PMV BLD AUTO: 9 FL (ref 6–12)
POTASSIUM SERPL-SCNC: 2.8 MMOL/L (ref 3.7–5.3)
POTASSIUM SERPL-SCNC: 3.8 MMOL/L (ref 3.7–5.3)
RBC # BLD AUTO: 3.86 M/UL (ref 4–5.2)
SODIUM SERPL-SCNC: 143 MMOL/L (ref 136–145)
SPECIMEN DESCRIPTION: ABNORMAL
WBC OTHER # BLD: 6.5 K/UL (ref 3.5–11)

## 2025-07-15 PROCEDURE — 85025 COMPLETE CBC W/AUTO DIFF WBC: CPT

## 2025-07-15 PROCEDURE — 6370000000 HC RX 637 (ALT 250 FOR IP): Performed by: INTERNAL MEDICINE

## 2025-07-15 PROCEDURE — 97110 THERAPEUTIC EXERCISES: CPT

## 2025-07-15 PROCEDURE — 99232 SBSQ HOSP IP/OBS MODERATE 35: CPT | Performed by: INTERNAL MEDICINE

## 2025-07-15 PROCEDURE — 2500000003 HC RX 250 WO HCPCS

## 2025-07-15 PROCEDURE — 83735 ASSAY OF MAGNESIUM: CPT

## 2025-07-15 PROCEDURE — 84132 ASSAY OF SERUM POTASSIUM: CPT

## 2025-07-15 PROCEDURE — 6360000002 HC RX W HCPCS

## 2025-07-15 PROCEDURE — 2580000003 HC RX 258

## 2025-07-15 PROCEDURE — 6370000000 HC RX 637 (ALT 250 FOR IP): Performed by: STUDENT IN AN ORGANIZED HEALTH CARE EDUCATION/TRAINING PROGRAM

## 2025-07-15 PROCEDURE — 82947 ASSAY GLUCOSE BLOOD QUANT: CPT

## 2025-07-15 PROCEDURE — 2060000000 HC ICU INTERMEDIATE R&B

## 2025-07-15 PROCEDURE — 6370000000 HC RX 637 (ALT 250 FOR IP)

## 2025-07-15 PROCEDURE — 36415 COLL VENOUS BLD VENIPUNCTURE: CPT

## 2025-07-15 PROCEDURE — 80048 BASIC METABOLIC PNL TOTAL CA: CPT

## 2025-07-15 PROCEDURE — 6370000000 HC RX 637 (ALT 250 FOR IP): Performed by: SURGERY

## 2025-07-15 RX ORDER — POTASSIUM CHLORIDE 1500 MG/1
40 TABLET, EXTENDED RELEASE ORAL PRN
Status: DISCONTINUED | OUTPATIENT
Start: 2025-07-15 | End: 2025-07-25 | Stop reason: HOSPADM

## 2025-07-15 RX ORDER — MAGNESIUM SULFATE IN WATER 40 MG/ML
2000 INJECTION, SOLUTION INTRAVENOUS PRN
Status: DISCONTINUED | OUTPATIENT
Start: 2025-07-15 | End: 2025-07-25 | Stop reason: HOSPADM

## 2025-07-15 RX ORDER — POTASSIUM CHLORIDE 7.45 MG/ML
10 INJECTION INTRAVENOUS PRN
Status: DISCONTINUED | OUTPATIENT
Start: 2025-07-15 | End: 2025-07-25 | Stop reason: HOSPADM

## 2025-07-15 RX ORDER — POTASSIUM CHLORIDE 1500 MG/1
40 TABLET, EXTENDED RELEASE ORAL ONCE
Status: COMPLETED | OUTPATIENT
Start: 2025-07-15 | End: 2025-07-15

## 2025-07-15 RX ADMIN — OXYBUTYNIN CHLORIDE 10 MG: 5 TABLET, EXTENDED RELEASE ORAL at 21:06

## 2025-07-15 RX ADMIN — THERA TABS 1 TABLET: TAB at 09:05

## 2025-07-15 RX ADMIN — DULOXETINE 30 MG: 30 CAPSULE, DELAYED RELEASE ORAL at 09:05

## 2025-07-15 RX ADMIN — EMPAGLIFLOZIN 10 MG: 10 TABLET, FILM COATED ORAL at 09:05

## 2025-07-15 RX ADMIN — MAGNESIUM SULFATE HEPTAHYDRATE 2000 MG: 40 INJECTION, SOLUTION INTRAVENOUS at 06:05

## 2025-07-15 RX ADMIN — POTASSIUM CHLORIDE 10 MEQ: 7.46 INJECTION, SOLUTION INTRAVENOUS at 06:09

## 2025-07-15 RX ADMIN — INSULIN LISPRO 2 UNITS: 100 INJECTION, SOLUTION INTRAVENOUS; SUBCUTANEOUS at 11:30

## 2025-07-15 RX ADMIN — CEFEPIME 2000 MG: 2 INJECTION, POWDER, FOR SOLUTION INTRAVENOUS at 17:54

## 2025-07-15 RX ADMIN — POTASSIUM CHLORIDE 10 MEQ: 7.46 INJECTION, SOLUTION INTRAVENOUS at 08:52

## 2025-07-15 RX ADMIN — SODIUM CHLORIDE, PRESERVATIVE FREE 10 ML: 5 INJECTION INTRAVENOUS at 21:06

## 2025-07-15 RX ADMIN — CEFEPIME 2000 MG: 2 INJECTION, POWDER, FOR SOLUTION INTRAVENOUS at 09:09

## 2025-07-15 RX ADMIN — SODIUM CHLORIDE, PRESERVATIVE FREE 10 ML: 5 INJECTION INTRAVENOUS at 09:06

## 2025-07-15 RX ADMIN — POTASSIUM CHLORIDE 10 MEQ: 7.46 INJECTION, SOLUTION INTRAVENOUS at 11:08

## 2025-07-15 RX ADMIN — METOPROLOL TARTRATE 25 MG: 25 TABLET, FILM COATED ORAL at 21:06

## 2025-07-15 RX ADMIN — ATORVASTATIN CALCIUM 20 MG: 20 TABLET, FILM COATED ORAL at 21:06

## 2025-07-15 RX ADMIN — PANTOPRAZOLE SODIUM 40 MG: 40 TABLET, DELAYED RELEASE ORAL at 06:11

## 2025-07-15 RX ADMIN — POTASSIUM CHLORIDE 40 MEQ: 1500 TABLET, EXTENDED RELEASE ORAL at 14:05

## 2025-07-15 RX ADMIN — PYRIDOSTIGMINE BROMIDE 60 MG: 60 TABLET ORAL at 09:05

## 2025-07-15 RX ADMIN — COLLAGENASE SANTYL: 250 OINTMENT TOPICAL at 09:06

## 2025-07-15 ASSESSMENT — PAIN SCALES - GENERAL: PAINLEVEL_OUTOF10: 0

## 2025-07-15 NOTE — PROGRESS NOTES
Physical Therapy  Facility/Department: [address]   Physical Therapy Daily Treatment Note    Patient Name: Claire Rubin        MRN: [id number]    : 1955    Date of Service: 7/15/2025    Chief Complaint   Patient presents with    Fatigue     Reports generalized weakness     Past Medical History:  has a past medical history of 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  Past Surgical History:  has a past surgical history that includes Hysterectomy, total abdominal (); Cholecystectomy, open (); Appendectomy (); Femur fracture surgery (Left, 2018); and Ovary removal.    Discharge Recommendations  Discharge Recommendations: Therapy recommended at discharge  PT Equipment Recommendations  Equipment Needed: No    Assessment  Body Structures, Functions, Activity Limitations Requiring Skilled Therapeutic Intervention: Decreased functional mobility ;Decreased ADL status;Decreased ROM;Decreased strength;Decreased balance;Decreased posture  Assessment: Pt presents with fatigue, weakness, sepsis due to sacral wound. Pt recently at respite stay at [place] since  and just released to home few days ago. At baseline, pt requires assist with mechanical stand lift into electric w/c, pt able to maneuver electric w/c around house for ind light meals, dishes and some laundry, requires assist for toileting, she performs assist with UE and trunk ADL's (family dependent with lower body). The patient participated in BLE stretching exercises this date. During previous session, the patient required max x 2 for gaudencio rolling, was dependent x 2 supine<->sit, and the pt sat on side of bed with CGA static balance and min assist x 1 dynamic balance. Pt will require 24 hour assist at discharge. Pt will benefit from continued skilled PT for LE stretching, trunk strengthening and functional mobility

## 2025-07-15 NOTE — PROGRESS NOTES
[facility]  Office: [phone]  Sage Waters DO, Donato Valencia, DO, Roberto Carlos Amaral DO, Jomar Valentine, DO, Chery Gandhi MD, Bertha Ryan MD, Eric Pruitt MD, Marcia Burton MD,  Hunter Toscano MD, Kandice Scales MD, Bella Harvey MD,  Balwinder Chung DO, Halley Anthony MD, Parag Plascencia MD, Yan Waters DO, Zaida Tse MD,  Isrrael Yin DO, Tosin Whaley MD, Becky Torrez MD, Laura Olguin MD,  Tam Ordonez MD, Eduardo Stock MD, Neli Laughlin MD, Salazar Dutta MD, Tavon Dominguez MD, Dodie Breen MD, Jenaro Snell, DO, Lilia Brown MD, Mook Miguel DO, Rosas Serrano MD, Balwinder Lanier MD, Mohsin Reza, MD, Mary Edward MD, Shirley Waterhouse, CNP,  Argentina Philippe, CNP, Jenaro Mccabe, CNP,  Peace De La Fuente, DNP, Lakshmi Vee, CNP, Ysabel Watson, CNP, Mira Barillas, CNP, Carmencita Driver, CNP, Gabby Ireland, PA-C, Azra German, CNP, Man Ball, CNP,  Kylee Lanier, CNP, Myrna Tellez, CNP, Bruce Justin, PA-C, Susie Mobley, PA-C, Krystyna Samuels, CNP,        Autumn Ang, CNS, Giselle Encarnacion, CNP, Azalia Chappell, CNP         [facility]   IN-PATIENT SERVICE   [facility]    Progress Note    7/15/2025    8:47 AM    Name:   Claire Rubin  MRN:     [id number]     Acct:      [id number]   Room:   [facility] Day:  2  Admit Date:  7/12/2025 10:01 PM    PCP:   Bjorn Yost MD  Code Status:  Full Code    Subjective:     C/C:   Chief Complaint   Patient presents with    Fatigue     Reports generalized weakness     Interval History Status: not changed.     Pt is resting in bed napping.  She denies any fever, cough or chills.  She does c/o tailbone pain at the end of the day.  She's receiving IV potassium and magnesium currently.    Brief History:     Per the medical record:  \"70 y.o. female is admitted for management of sepsis secondary to sacral wound. She states she has had increased fatigue, worsening pain in her sacral wound, and

## 2025-07-15 NOTE — PLAN OF CARE
Problem: Chronic Conditions and Co-morbidities  Goal: Patient's chronic conditions and co-morbidity symptoms are monitored and maintained or improved  Outcome: Progressing  Flowsheets (Taken 7/15/2025 0730)  Care Plan - Patient's Chronic Conditions and Co-Morbidity Symptoms are Monitored and Maintained or Improved: Monitor and assess patient's chronic conditions and comorbid symptoms for stability, deterioration, or improvement     Problem: Discharge Planning  Goal: Discharge to home or other facility with appropriate resources  Outcome: Progressing  Flowsheets (Taken 7/15/2025 0730)  Discharge to home or other facility with appropriate resources:   Identify barriers to discharge with patient and caregiver   Arrange for needed discharge resources and transportation as appropriate   Identify discharge learning needs (meds, wound care, etc)     Problem: Safety - Adult  Goal: Free from fall injury  Outcome: Progressing  Flowsheets (Taken 7/15/2025 0730)  Free From Fall Injury: Instruct family/caregiver on patient safety

## 2025-07-15 NOTE — CARE COORDINATION
Referral sent to [facility] and #1 choice.  Patient will review other options with her daughter if needed, she does not want to go to [facility] where she had just been discharged from.    1612-[facility] cannot accept and patient will speak with her daughter about other choices.

## 2025-07-15 NOTE — PROGRESS NOTES
Department of General Surgery - Adult  Surgical Service [facility]  Attending Progress Note      SUBJECTIVE: The patient reported only minimal discomfort in her sacral area.  Her wounds were examined by my partner Dr. Alonso yesterday and she noted a stage II/III sacral decubitus ulcer with no necrotic areas requiring debridement.  She had recommended Santyl ointment and foam dressings.  She noted a 1 to 2 cm eschar on the dorsum of the right fourth toe and some erythema of the right heel but no skin breakdown.    OBJECTIVE      Physical    VITALS:  BP (!) 146/75   Pulse 91   Temp 100.2 °F (37.9 °C) (Oral)   Resp 18   Ht 1.6 m (5' 3\")   Wt 93.1 kg (205 lb 4 oz)   SpO2 93%   BMI 36.36 kg/m²   INTAKE/OUTPUT:    Intake/Output Summary (Last 24 hours) at 7/15/2025 0833  Last data filed at 7/15/2025 0609  Gross per 24 hour   Intake 360 ml   Output 400 ml   Net -40 ml     Resting comfortably in bed.  Her dressings were clean and were not taken down.  Pure wick urinary catheter in place, draining clear urine.    Data  CBC:   Lab Results   Component Value Date/Time    WBC 6.5 07/15/2025 04:36 AM    RBC 3.86 07/15/2025 04:36 AM    RBC 4.20 09/26/2011 11:30 AM    HGB 11.1 07/15/2025 04:36 AM    HCT 34.3 07/15/2025 04:36 AM    MCV 88.8 07/15/2025 04:36 AM    MCH 28.8 07/15/2025 04:36 AM    MCHC 32.4 07/15/2025 04:36 AM    RDW 18.0 07/15/2025 04:36 AM     07/15/2025 04:36 AM     09/26/2011 11:30 AM    MPV 9.0 07/15/2025 04:36 AM     BMP:    Lab Results   Component Value Date/Time     07/15/2025 04:36 AM    K 2.8 07/15/2025 04:36 AM     07/15/2025 04:36 AM    CO2 25 07/15/2025 04:36 AM    BUN 8 07/15/2025 04:36 AM    CREATININE 0.6 07/15/2025 04:36 AM    CALCIUM 8.0 07/15/2025 04:36 AM    GFRAA >60 10/19/2021 10:31 AM    LABGLOM >90 07/15/2025 04:36 AM    LABGLOM >90 03/28/2024 10:38 AM    GLUCOSE 92 07/15/2025 04:36 AM       Current Inpatient Medications    Current

## 2025-07-16 LAB
ANION GAP SERPL CALCULATED.3IONS-SCNC: 9 MMOL/L (ref 9–16)
BUN SERPL-MCNC: 8 MG/DL (ref 8–23)
CALCIUM SERPL-MCNC: 8.4 MG/DL (ref 8.6–10.4)
CHLORIDE SERPL-SCNC: 109 MMOL/L (ref 98–107)
CO2 SERPL-SCNC: 25 MMOL/L (ref 20–31)
CREAT SERPL-MCNC: 0.6 MG/DL (ref 0.6–0.9)
GFR, ESTIMATED: >90 ML/MIN/1.73M2
GLUCOSE BLD-MCNC: 112 MG/DL (ref 65–105)
GLUCOSE BLD-MCNC: 123 MG/DL (ref 65–105)
GLUCOSE BLD-MCNC: 164 MG/DL (ref 65–105)
GLUCOSE BLD-MCNC: 228 MG/DL (ref 65–105)
GLUCOSE SERPL-MCNC: 110 MG/DL (ref 74–99)
POTASSIUM SERPL-SCNC: 3.8 MMOL/L (ref 3.7–5.3)
SODIUM SERPL-SCNC: 143 MMOL/L (ref 136–145)

## 2025-07-16 PROCEDURE — 6360000002 HC RX W HCPCS

## 2025-07-16 PROCEDURE — 6370000000 HC RX 637 (ALT 250 FOR IP): Performed by: SURGERY

## 2025-07-16 PROCEDURE — 2580000003 HC RX 258

## 2025-07-16 PROCEDURE — 36415 COLL VENOUS BLD VENIPUNCTURE: CPT

## 2025-07-16 PROCEDURE — 6370000000 HC RX 637 (ALT 250 FOR IP)

## 2025-07-16 PROCEDURE — 2060000000 HC ICU INTERMEDIATE R&B

## 2025-07-16 PROCEDURE — 80048 BASIC METABOLIC PNL TOTAL CA: CPT

## 2025-07-16 PROCEDURE — 6370000000 HC RX 637 (ALT 250 FOR IP): Performed by: INTERNAL MEDICINE

## 2025-07-16 PROCEDURE — 97110 THERAPEUTIC EXERCISES: CPT

## 2025-07-16 PROCEDURE — 2500000003 HC RX 250 WO HCPCS

## 2025-07-16 PROCEDURE — 99232 SBSQ HOSP IP/OBS MODERATE 35: CPT | Performed by: INTERNAL MEDICINE

## 2025-07-16 PROCEDURE — 82947 ASSAY GLUCOSE BLOOD QUANT: CPT

## 2025-07-16 RX ORDER — MULTIVITAMIN WITH IRON
1 TABLET ORAL DAILY
DISCHARGE
Start: 2025-07-17

## 2025-07-16 RX ORDER — LANOLIN ALCOHOL/MO/W.PET/CERES
400 CREAM (GRAM) TOPICAL DAILY
Status: DISCONTINUED | OUTPATIENT
Start: 2025-07-16 | End: 2025-07-25 | Stop reason: HOSPADM

## 2025-07-16 RX ORDER — LANOLIN ALCOHOL/MO/W.PET/CERES
400 CREAM (GRAM) TOPICAL DAILY
DISCHARGE
Start: 2025-07-16

## 2025-07-16 RX ORDER — CEFUROXIME AXETIL 250 MG/1
500 TABLET ORAL EVERY 12 HOURS SCHEDULED
Status: COMPLETED | OUTPATIENT
Start: 2025-07-16 | End: 2025-07-21

## 2025-07-16 RX ORDER — SENNA AND DOCUSATE SODIUM 50; 8.6 MG/1; MG/1
2 TABLET, FILM COATED ORAL DAILY
DISCHARGE
Start: 2025-07-16 | End: 2025-07-21 | Stop reason: HOSPADM

## 2025-07-16 RX ORDER — SENNA AND DOCUSATE SODIUM 50; 8.6 MG/1; MG/1
2 TABLET, FILM COATED ORAL DAILY
Status: DISCONTINUED | OUTPATIENT
Start: 2025-07-16 | End: 2025-07-19

## 2025-07-16 RX ADMIN — ATORVASTATIN CALCIUM 20 MG: 20 TABLET, FILM COATED ORAL at 20:40

## 2025-07-16 RX ADMIN — SODIUM CHLORIDE, PRESERVATIVE FREE 10 ML: 5 INJECTION INTRAVENOUS at 09:48

## 2025-07-16 RX ADMIN — OXYBUTYNIN CHLORIDE 10 MG: 5 TABLET, EXTENDED RELEASE ORAL at 20:40

## 2025-07-16 RX ADMIN — PANTOPRAZOLE SODIUM 40 MG: 40 TABLET, DELAYED RELEASE ORAL at 06:18

## 2025-07-16 RX ADMIN — CEFEPIME 2000 MG: 2 INJECTION, POWDER, FOR SOLUTION INTRAVENOUS at 06:16

## 2025-07-16 RX ADMIN — RIVAROXABAN 20 MG: 10 TABLET, FILM COATED ORAL at 09:47

## 2025-07-16 RX ADMIN — DULOXETINE 30 MG: 30 CAPSULE, DELAYED RELEASE ORAL at 09:47

## 2025-07-16 RX ADMIN — THERA TABS 1 TABLET: TAB at 09:47

## 2025-07-16 RX ADMIN — EMPAGLIFLOZIN 10 MG: 10 TABLET, FILM COATED ORAL at 09:47

## 2025-07-16 RX ADMIN — CEFUROXIME AXETIL 500 MG: 250 TABLET, FILM COATED ORAL at 20:40

## 2025-07-16 RX ADMIN — Medication 400 MG: at 17:30

## 2025-07-16 RX ADMIN — METOPROLOL TARTRATE 25 MG: 25 TABLET, FILM COATED ORAL at 09:47

## 2025-07-16 RX ADMIN — SODIUM CHLORIDE, PRESERVATIVE FREE 10 ML: 5 INJECTION INTRAVENOUS at 20:40

## 2025-07-16 RX ADMIN — INSULIN LISPRO 2 UNITS: 100 INJECTION, SOLUTION INTRAVENOUS; SUBCUTANEOUS at 20:40

## 2025-07-16 RX ADMIN — COLLAGENASE SANTYL: 250 OINTMENT TOPICAL at 11:36

## 2025-07-16 RX ADMIN — PYRIDOSTIGMINE BROMIDE 60 MG: 60 TABLET ORAL at 09:47

## 2025-07-16 ASSESSMENT — PAIN SCALES - GENERAL: PAINLEVEL_OUTOF10: 4

## 2025-07-16 ASSESSMENT — PAIN - FUNCTIONAL ASSESSMENT: PAIN_FUNCTIONAL_ASSESSMENT: ACTIVITIES ARE NOT PREVENTED

## 2025-07-16 ASSESSMENT — PAIN DESCRIPTION - LOCATION: LOCATION: COCCYX

## 2025-07-16 ASSESSMENT — PAIN DESCRIPTION - PAIN TYPE: TYPE: CHRONIC PAIN

## 2025-07-16 ASSESSMENT — PAIN DESCRIPTION - DESCRIPTORS: DESCRIPTORS: ACHING

## 2025-07-16 NOTE — PLAN OF CARE
Problem: Safety - Adult  Goal: Free from fall injury  Outcome: Progressing     Problem: Chronic Conditions and Co-morbidities  Goal: Patient's chronic conditions and co-morbidity symptoms are monitored and maintained or improved  Outcome: Progressing  Flowsheets  Taken 7/16/2025 1700  Care Plan - Patient's Chronic Conditions and Co-Morbidity Symptoms are Monitored and Maintained or Improved:   Monitor and assess patient's chronic conditions and comorbid symptoms for stability, deterioration, or improvement   Collaborate with multidisciplinary team to address chronic and comorbid conditions and prevent exacerbation or deterioration   Update acute care plan with appropriate goals if chronic or comorbid symptoms are exacerbated and prevent overall improvement and discharge  Taken 7/16/2025 1138  Care Plan - Patient's Chronic Conditions and Co-Morbidity Symptoms are Monitored and Maintained or Improved:   Monitor and assess patient's chronic conditions and comorbid symptoms for stability, deterioration, or improvement   Collaborate with multidisciplinary team to address chronic and comorbid conditions and prevent exacerbation or deterioration   Update acute care plan with appropriate goals if chronic or comorbid symptoms are exacerbated and prevent overall improvement and discharge  Taken 7/16/2025 0900  Care Plan - Patient's Chronic Conditions and Co-Morbidity Symptoms are Monitored and Maintained or Improved:   Monitor and assess patient's chronic conditions and comorbid symptoms for stability, deterioration, or improvement   Collaborate with multidisciplinary team to address chronic and comorbid conditions and prevent exacerbation or deterioration   Update acute care plan with appropriate goals if chronic or comorbid symptoms are exacerbated and prevent overall improvement and discharge     Problem: Discharge Planning  Goal: Discharge to home or other facility with appropriate resources  Outcome:

## 2025-07-16 NOTE — PLAN OF CARE
Problem: Safety - Adult  Goal: Free from fall injury  7/16/2025 0224 by Brisa Carrion RN  Outcome: Progressing  Flowsheets (Taken 7/15/2025 1930)  Free From Fall Injury:   Instruct family/caregiver on patient safety   Based on caregiver fall risk screen, instruct family/caregiver to ask for assistance with transferring infant if caregiver noted to have fall risk factors  7/15/2025 1900 by Sean Cyr RN  Outcome: Progressing  Flowsheets (Taken 7/15/2025 0730)  Free From Fall Injury: Instruct family/caregiver on patient safety     Problem: Chronic Conditions and Co-morbidities  Goal: Patient's chronic conditions and co-morbidity symptoms are monitored and maintained or improved  7/16/2025 0224 by Brisa Carrion RN  Outcome: Progressing  Flowsheets (Taken 7/15/2025 1930)  Care Plan - Patient's Chronic Conditions and Co-Morbidity Symptoms are Monitored and Maintained or Improved:   Monitor and assess patient's chronic conditions and comorbid symptoms for stability, deterioration, or improvement   Collaborate with multidisciplinary team to address chronic and comorbid conditions and prevent exacerbation or deterioration   Update acute care plan with appropriate goals if chronic or comorbid symptoms are exacerbated and prevent overall improvement and discharge  7/15/2025 1900 by Sean Cyr RN  Outcome: Progressing  Flowsheets (Taken 7/15/2025 0730)  Care Plan - Patient's Chronic Conditions and Co-Morbidity Symptoms are Monitored and Maintained or Improved: Monitor and assess patient's chronic conditions and comorbid symptoms for stability, deterioration, or improvement     Problem: Discharge Planning  Goal: Discharge to home or other facility with appropriate resources  7/16/2025 0224 by Brisa Carrion RN  Outcome: Progressing  Flowsheets (Taken 7/15/2025 1930)  Discharge to home or other facility with appropriate resources:   Identify barriers to discharge with patient and caregiver   Arrange for needed

## 2025-07-16 NOTE — CARE COORDINATION
Spoke with Patient she had 2 choices highlighted for SNF  but not  completely sure about them .  I offered to check with her daughter she related no My daughter works. I told her we would send referrals and she has final say . Sent referral to [facility] and to [facility]

## 2025-07-16 NOTE — PROGRESS NOTES
Physical Therapy  Facility/Department: [address]   Physical Therapy Daily Treatment Note     Patient Name: Claire Rubin        MRN: [id number]    : 1955    Date of Service: 2025          Chief Complaint   Patient presents with    Fatigue       Reports generalized weakness      Past Medical History:  has a past medical history of 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  Past Surgical History:  has a past surgical history that includes Hysterectomy, total abdominal (); Cholecystectomy, open (); Appendectomy (); Femur fracture surgery (Left, 2018); and Ovary removal.     Discharge Recommendations  Discharge Recommendations: Therapy recommended at discharge  PT Equipment Recommendations  Equipment Needed: No     Assessment  Body Structures, Functions, Activity Limitations Requiring Skilled Therapeutic Intervention: Decreased functional mobility ;Decreased ADL status;Decreased ROM;Decreased strength;Decreased balance;Decreased posture  Assessment: Pt presents with fatigue, weakness, sepsis due to sacral wound. Pt recently at respite stay at [facility] since  and just released to home few days ago. At baseline, pt requires assist with mechanical stand lift into electric w/c, pt able to maneuver electric w/c around house for ind light meals, dishes and some laundry, requires assist for toileting, she performs assist with UE and trunk ADL's (family dependent with lower body). The patient participated in BLE stretching exercises this date. During previous session, the patient required max x 2 for gaudencio rolling, was dependent x 2 supine<->sit, and the pt sat on side of bed with CGA static balance and min assist x 1 dynamic balance. Pt will require 24 hour assist at discharge. Pt will benefit from continued skilled PT for LE stretching, trunk strengthening and

## 2025-07-16 NOTE — DISCHARGE INSTR - COC
Continuity of Care Form    Patient Name: Claier Rubin   :  1955  MRN:  [id number]    Admit date:  2025  Discharge date:  25    Code Status Order: Full Code   Advance Directives:     Admitting Physician:  David Rowan MD  PCP: Bjorn Yost MD    Discharging Nurse: [facility] Unit/Room#: 346/346-01  Discharging Unit Phone Number: [phone]    Emergency Contact:   Extended Emergency Contact Information  Primary Emergency Contact: *HIPPA* NATE CHAPA  Home Phone: [phone]  Work Phone: [phone]  Mobile Phone: [phone]  Relation: Child   needed? No    Past Surgical History:  Past Surgical History:   Procedure Laterality Date    APPENDECTOMY      CHOLECYSTECTOMY, OPEN      FEMUR FRACTURE SURGERY Left 2018    HYSTERECTOMY, TOTAL ABDOMINAL (CERVIX REMOVED)      OVARY REMOVAL         Immunization History:   Immunization History   Administered Date(s) Administered    COVID-19, PFIZER Bivalent, DO NOT Dilute, (age 12y+), IM, 30 mcg/0.3 mL 2022    COVID-19, PFIZER PURPLE top, DILUTE for use, (age 12 y+), 30mcg/0.3mL 2021, 2021, 2021    Influenza A (H5N1) Monovalent Vaccine, Adjuvanted-2015    Influenza, FLUAD, (age 65 y+), IM, Quadv, 0.5mL 2022, 2023    Influenza, FLUAD, (age 65 y+), IM, Trivalent PF, 0.5mL 2024    Influenza, FLUARIX, FLULAVAL, FLUZONE (age 6 mo+) and AFLURIA, (age 3 y+), Quadv PF, 0.5mL 2009, 10/01/2017, 2018, 2019, 2020    Influenza, FLUCELVAX, (age 6 mo+), MDCK, Quadv PF, 0.5mL 2021    Influenza, [id number] 2015    PPD Test 2011    Pneumococcal, PCV20, PREVNAR 20, (age 6w+), IM, 0.5mL 2022    Pneumococcal, PPSV23, PNEUMOVAX 23, (age 2y+), SC/IM, 0.5mL 2017    TDaP, ADACEL (age 10y-64y), BOOSTRIX (age 10y+), IM, 0.5mL 2005, 2018    Zoster Recombinant (Shingrix) 2021, 2021       Active

## 2025-07-16 NOTE — PROGRESS NOTES
Subjective    70-year-old that is a very pleasant lady that had transverse myelitis a number of years ago and has had right arm and both legs affected since that time.  She has learned to eat with her left hand.  She is able despite her inability to move her lower extremities live with her daughter.  She from time to time will go to respite care and when she was coming back from respite care was found to have a sacral decubitus and was brought to the hospital.  She is not having any pain with this.  She cannot feel that area very well.  She says that she does have feeling but has no motion.  The wound is full-thickness skin and into the subcutaneous tissue but there is no exposed fascia or tendon or bone.  She also has a wound of the top of her fourth toe that is scabbed over.  She has heel protecting foam dressings on but no open wound.  I do feel that a wound VAC and avoiding pressure to this area would allow this wound to heal the quickest possible.    Objective    Alert and oriented  Chest symmetric excursion  Abdomen obese and soft  Extremities can move her left upper extremity well.  Can move her right upper extremity but really has no use of her right hand.  She cannot move her lower extremities at all  Has a sacral decubitus that is just full-thickness skin and subcutaneous tissue but no exposed fascia or tendon.    Assessment and plan    70-year-old that came in from a brief respite care with a new decubitus ulcer of her sacrum.  This is full-thickness skin and subcutaneous tissue but no exposed fascia or tendon or bone.  It is clean.  I do feel that she would benefit from a wound VAC and avoiding pressure to this area.  Okay to discharge to home or facility that the patient and her daughter are comfortable with with outpatient wound care follow-up.

## 2025-07-16 NOTE — PROGRESS NOTES
[facility]  Office: [phone]  Sage Waters DO, Donato Valencia, DO, Roberto Carlos Amaral DO, Jomar Valentine, DO, Chery Gandhi MD, Bertha Ryan MD, Eric Pruitt MD, Marcia Burton MD,  Hunter Toscano MD, Kandice Scales MD, Bella Harvey MD,  Balwinder Chung DO, Halley Anthnoy MD, Parag Plascencia MD, Yan Waters DO, Zaida Tse MD,  Isrrael Yin DO, Tosin Whaley MD, Becky Torrez MD, Laura Olguin MD,  Tam Ordonez MD, Eduardo Stock MD, Neli Laughlin MD, Salazar Dutta MD, Tavon Dominguez MD, Dodie Breen MD, Jenaro Snell, DO, Lilia Brown MD, Mook Miguel DO, Rosas Serrano MD, Balwinder Lanier MD, Mohsin Reza, MD, Mary Edward MD, Shirley Waterhouse, CNP,  Argentina Philippe, CNP, Jenaro Mccabe, CNP,  Peace De La Fuente, DNP, Lakshmi Vee, CNP, Ysabel Watson, CNP, Mira Barillas, CNP, Carmencita Driver, CNP, Gabby Ireland, PA-C, Azra German, CNP, Man Ball, CNP,  Kylee Lanier, CNP, Myrna Tellez, CNP, Bruce Justin, PA-C, Susie Mobley, PA-C, Krystyna Samuels, CNP,        Autumn Ang, CNS, Giselle Encarnacion, CNP, Azalia Chappell, CNP         [facility]   IN-PATIENT SERVICE   [facility]    Progress Note    7/16/2025    9:22 AM    Name:   Claire Rubin  MRN:     [id number]     Acct:      [id number]   Room:   [facility]/346-01   Day:  3  Admit Date:  7/12/2025 10:01 PM    PCP:   Bjorn Yost MD  Code Status:  Full Code    Subjective:     C/C:   Chief Complaint   Patient presents with    Fatigue     Reports generalized weakness     Interval History Status: not changed.     Pt is resting in bed finishing up lunch.  She denies any fever, cough or chills.   She is c/o constipation.  She was denied a stay at [facility], and is currently choosing a few more places to go for rehab.    Brief History:     Per the medical record:  \"70 y.o. female is admitted for management of sepsis secondary to sacral wound. She states she has had increased fatigue, worsening

## 2025-07-17 LAB
GLUCOSE BLD-MCNC: 131 MG/DL (ref 65–105)
GLUCOSE BLD-MCNC: 152 MG/DL (ref 65–105)
GLUCOSE BLD-MCNC: 166 MG/DL (ref 65–105)
GLUCOSE BLD-MCNC: 93 MG/DL (ref 65–105)

## 2025-07-17 PROCEDURE — 2060000000 HC ICU INTERMEDIATE R&B

## 2025-07-17 PROCEDURE — 97608 NEG PRS WND THER NDME>50SQCM: CPT

## 2025-07-17 PROCEDURE — 6370000000 HC RX 637 (ALT 250 FOR IP): Performed by: INTERNAL MEDICINE

## 2025-07-17 PROCEDURE — 99232 SBSQ HOSP IP/OBS MODERATE 35: CPT | Performed by: INTERNAL MEDICINE

## 2025-07-17 PROCEDURE — 97606 NEG PRS WND THER DME>50 SQCM: CPT

## 2025-07-17 PROCEDURE — 6370000000 HC RX 637 (ALT 250 FOR IP)

## 2025-07-17 PROCEDURE — 97530 THERAPEUTIC ACTIVITIES: CPT

## 2025-07-17 PROCEDURE — 97535 SELF CARE MNGMENT TRAINING: CPT

## 2025-07-17 PROCEDURE — 2500000003 HC RX 250 WO HCPCS

## 2025-07-17 PROCEDURE — 6370000000 HC RX 637 (ALT 250 FOR IP): Performed by: SURGERY

## 2025-07-17 PROCEDURE — 82947 ASSAY GLUCOSE BLOOD QUANT: CPT

## 2025-07-17 PROCEDURE — F08G5YZ WOUND MANAGEMENT TREATMENT OF INTEGUMENTARY SYSTEM - LOWER BACK / LOWER EXTREMITY USING OTHER EQUIPMENT: ICD-10-PCS | Performed by: SURGERY

## 2025-07-17 RX ORDER — AMLODIPINE BESYLATE 10 MG/1
10 TABLET ORAL DAILY
Status: DISCONTINUED | OUTPATIENT
Start: 2025-07-17 | End: 2025-07-25 | Stop reason: HOSPADM

## 2025-07-17 RX ADMIN — PANTOPRAZOLE SODIUM 40 MG: 40 TABLET, DELAYED RELEASE ORAL at 06:26

## 2025-07-17 RX ADMIN — OXYBUTYNIN CHLORIDE 10 MG: 5 TABLET, EXTENDED RELEASE ORAL at 20:59

## 2025-07-17 RX ADMIN — CEFUROXIME AXETIL 500 MG: 250 TABLET, FILM COATED ORAL at 10:08

## 2025-07-17 RX ADMIN — DULOXETINE 30 MG: 30 CAPSULE, DELAYED RELEASE ORAL at 10:07

## 2025-07-17 RX ADMIN — SODIUM CHLORIDE, PRESERVATIVE FREE 10 ML: 5 INJECTION INTRAVENOUS at 21:00

## 2025-07-17 RX ADMIN — PYRIDOSTIGMINE BROMIDE 60 MG: 60 TABLET ORAL at 10:07

## 2025-07-17 RX ADMIN — EMPAGLIFLOZIN 10 MG: 10 TABLET, FILM COATED ORAL at 10:06

## 2025-07-17 RX ADMIN — ATORVASTATIN CALCIUM 20 MG: 20 TABLET, FILM COATED ORAL at 20:59

## 2025-07-17 RX ADMIN — METOPROLOL TARTRATE 25 MG: 25 TABLET, FILM COATED ORAL at 10:07

## 2025-07-17 RX ADMIN — AMLODIPINE BESYLATE 10 MG: 10 TABLET ORAL at 10:07

## 2025-07-17 RX ADMIN — METOPROLOL TARTRATE 25 MG: 25 TABLET, FILM COATED ORAL at 20:59

## 2025-07-17 RX ADMIN — RIVAROXABAN 20 MG: 10 TABLET, FILM COATED ORAL at 10:07

## 2025-07-17 RX ADMIN — CEFUROXIME AXETIL 500 MG: 250 TABLET, FILM COATED ORAL at 20:59

## 2025-07-17 RX ADMIN — SODIUM CHLORIDE, PRESERVATIVE FREE 10 ML: 5 INJECTION INTRAVENOUS at 10:08

## 2025-07-17 ASSESSMENT — PAIN DESCRIPTION - LOCATION: LOCATION: COCCYX

## 2025-07-17 ASSESSMENT — PAIN SCALES - GENERAL: PAINLEVEL_OUTOF10: 2

## 2025-07-17 NOTE — PROGRESS NOTES
Occupational Therapy  Occupational Therapy Daily Treatment Note  Facility/Department: [address]   Patient Name: Claire Rubin        MRN: [id number]    : 1955    Date of Service: 2025    Chief Complaint   Patient presents with    Fatigue     Reports generalized weakness     Past Medical History:  has a past medical history of 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  Past Surgical History:  has a past surgical history that includes Hysterectomy, total abdominal (); Cholecystectomy, open (); Appendectomy (); Femur fracture surgery (Left, 2018); and Ovary removal.    Discharge Recommendations  Discharge Recommendations: Patient would benefit from continued therapy after discharge  OT Equipment Recommendations  Other: resting hand splint for R UE night time wear    Assessment  Performance deficits / Impairments: Decreased functional mobility ;Decreased ADL status;Decreased balance  Assessment: Pt presents to OT this date with above noted deficits. Pt is not currently functioning at [facility] but is making slow progress towards OT goals as now requires Max Ax2 for bed mobility and sat EOB CGA for 15-18min for ADLs and HEP. At this time Pt does not demonstrate the functional ability to safely return to prior living arrangements. It is recommended that Pt recieve OT services during hospitalization and after discharge to increase safety/ADLs for safe return to previous environment.  Prognosis: Good;Fair  Decision Making: Medium Complexity  REQUIRES OT FOLLOW-UP: Yes  Activity Tolerance  Activity Tolerance: Patient tolerated treatment well  Safety Devices  Type of Devices: Bed alarm in place, Call light within reach, Gait belt, Patient at risk for falls, Left in bed, Nurse notified    AM-PAC  AM-PAC Daily Activity - Inpatient   How much help is needed for putting on and taking off

## 2025-07-17 NOTE — PROGRESS NOTES
[facility]  Office: [phone]  Sage Waters DO, Donato Valencia, DO, Roberto Carlos Amaral DO, Jomar Valentine, DO, Chery Gandhi MD, Bertha Ryan MD, Eric Pruitt MD, Marcia Burton MD,  Hunter Toscano MD, Kandice Scales MD, Bella Harvey MD,  Balwinder Chung DO, Halley Anthony MD, Parag Plascencia MD, Yan Waters DO, Zaida Tse MD,  Isrrael Yin DO, oTsin Whaley MD, Becky Torrez MD, Laura Olguin MD,  Tam Ordonez MD, Eduardo Stock MD, Neli Laughlin MD, Salazar Dutta MD, Tavon Dominguez MD, Dodie Breen MD, Jenaro Snell, DO, Lilia Brown MD, Mook Miguel DO, Rosas Serrano MD, Balwinder Lanier MD, Mohsin Reza, MD, Mary Edward MD, Shirley Waterhouse, CNP,  Argentina Philippe, CNP, Jenaro Mccabe, CNP,  Peace De La Fuente, DNP, Lakshmi Vee, CNP, Ysabel Watson, CNP, Mira Barillas, CNP, Carmencita Driver, CNP, Gabby Ireland, PA-C, Azra German, CNP, Man Ball, CNP,  Kylee Lanier, CNP, Myrna Tellez, CNP, Bruce Justin, PA-C, Susie Mobley, PA-C, Krystyna Samuels, CNP,        Autumn Ang, CNS, Giselle Encarnacion, CNP, Azalia Chappell, CNP         [facility]   IN-PATIENT SERVICE   [facility]    Progress Note    7/17/2025    8:15 AM    Name:   Claire Rubin  MRN:     [id number]     Acct:      [id number]   Room:   346/346-01   Day:  4  Admit Date:  7/12/2025 10:01 PM    PCP:   Bjorn Yost MD  Code Status:  Full Code    Subjective:     C/C:   Chief Complaint   Patient presents with    Fatigue     Reports generalized weakness     Interval History Status: not changed.     Pt is sitting up in a chair.  She denies any fever, cough or chills.   She is currently choosing a few more places to go for rehab.    Brief History:     Per the medical record:  \"70 y.o. female is admitted for management of sepsis secondary to sacral wound. She states she has had increased fatigue, worsening pain in her sacral wound, and generalized weakness and lightheadedness for

## 2025-07-17 NOTE — PROGRESS NOTES
General Surgery:  Daily Progress Note                  PATIENT NAME: Claire Rubin   TODAY'S DATE: 7/17/2025, 9:18 AM    SUBJECTIVE:     Pt seen and examined at bedside. Denies  fever, chills, nausea, vomiting, chest pain, and SOB. Denies pain.  Tolerating diet.   Encouraged activity/up in chair.  She has no acute complaints today and has doing fairly well.  She had her sacral wound VAC placed today with no complications.  She is not having any discomfort from it.     OBJECTIVE:   VITALS:  BP (!) 163/79   Pulse 70   Temp 97.9 °F (36.6 °C) (Oral)   Resp 20   Ht 1.6 m (5' 3\")   Wt 97.7 kg (215 lb 6.2 oz)   SpO2 95%   BMI 38.15 kg/m²      INTAKE/OUTPUT:      Intake/Output Summary (Last 24 hours) at 7/17/2025 0918  Last data filed at 7/17/2025 0621  Gross per 24 hour   Intake 240 ml   Output 1750 ml   Net -1510 ml       PHYSICAL EXAM:  General Appearance: Awake, alert, non distressed  HEENT:  Normocephalic, atraumatic, mucus membranes moist   Heart: Regular rate and rhythm  Lungs: Equal chest rise and fall, non labored breathing  Abdomen: soft, non tender, no rebound, no guarding   Images of sacral decub and wound VAC placement media reviewed from today 7/17, no acute concerns from gen surg stance and agree with wound VAC therapy        Extremities: No cyanosis, pitting edema, rashes noted.    Skin: Dry, good turgor    Data:  CBC with Differential:    Lab Results   Component Value Date/Time    WBC 6.5 07/15/2025 04:36 AM    RBC 3.86 07/15/2025 04:36 AM    RBC 4.20 09/26/2011 11:30 AM    HGB 11.1 07/15/2025 04:36 AM    HCT 34.3 07/15/2025 04:36 AM     07/15/2025 04:36 AM     09/26/2011 11:30 AM    MCV 88.8 07/15/2025 04:36 AM    MCH 28.8 07/15/2025 04:36 AM    MCHC 32.4 07/15/2025 04:36 AM    RDW 18.0 07/15/2025 04:36 AM    LYMPHOPCT 32 07/15/2025 04:36 AM    MONOPCT 6 07/15/2025 04:36 AM    EOSPCT 3 07/15/2025 04:36 AM    BASOPCT 1 07/15/2025 04:36 AM    MONOSABS 0.40 07/15/2025 04:36 AM

## 2025-07-17 NOTE — PROGRESS NOTES
[facility]  Progress Note      NAME:  Claire Rubin  MEDICAL RECORD NUMBER:  [id number]  AGE: 70 y.o.   GENDER: female  : 1955  TODAY'S DATE:  2025    WO nursing to bedside to place NPWT, as requested by general surgery. Old dressing removed, as it was saturated with urine due to incontinence. Patient cleaned, new brief and external catheter applied. Wound cleansed with saline, measured and pictured. Mari-wound prepped with skin barrier wipes and hydrocolloid dressing. NPWT dressing applied using a piece of black foam and a second piece of black foam to bridge to left hip. Continuous suction noted with no leaks present.   Dr. Gamino notified of placement with no complications.     Outpatient referral placed to [facility] for after discharge.     Plan for next dressing change on  by WOC RN if patient is still admitted.     If NPWT suction fails or patient is discharged to facility:  - Remove vac dressing  - Cleanse wound with normal saline   - Pack wound with saline moistened gauze and change every 12 hours   -notify WOC RN      Measurements:  Negative Pressure Wound Therapy Coccyx (Active)   Dressing Status Intact w/seal 25 0845   Canister changed? Yes 25 0845   Unit Type KCI Ulta 25 0845   Mode Continuous 25 0845   Target Pressure (mmHg) 125 25 0845   $$ Dressing Changed & Charged $ Standard NPWT >50 sq cm PER TX 25 0845   Number of pieces placed 2 25 0845   Dressing Type Black foam 25 0845   Dressing Change Due 25 0845   Number of days: 0       Wound 25 Coccyx (Active)   Wound Image   25 0845   Wound Etiology Pressure Unstageable 25 0845   Dressing Status New dressing applied;Old drainage noted 25 0845   Wound Cleansed Cleansed with saline 25 0845   Dressing/Treatment Negative pressure wound therapy 25 0845   Dressing Change Due 25 0845   Wound  Length (cm) 6.7 cm 07/17/25 0845   Wound Width (cm) 8.7 cm 07/17/25 0845   Wound Depth (cm) 1.1 cm 07/17/25 0845   Wound Surface Area (cm^2) 58.29 cm^2 07/17/25 0845   Change in Wound Size % (l*w) -64.2 07/17/25 0845   Wound Volume (cm^3) 64.119 cm^3 07/17/25 0845   Wound Healing % -201 07/17/25 0845   Wound Assessment Devitalized tissue;Slough;Eschar moist 07/17/25 0845   Drainage Amount Moderate (25-50%) 07/17/25 0845   Drainage Description Yellow;Serosanguinous 07/17/25 0845   Odor None 07/17/25 0845   Mari-wound Assessment Blanchable erythema;Hyperpigmented;Fragile 07/17/25 0845   Margins Defined edges 07/17/25 0845   Number of days: 3         For concerns, the WO nursing team can be reached via [employer] by searching \"wound ostomy\" under groups and choosing the [facility] option Monday-Friday 8am-4pm.     MANOJ Mckinley, RN  [facility]  Wound, Ostomy, and Continence Nursing  [phone]    Electronically signed by Consuelo Ceballos RN on 7/17/2025 at 10:08 AM

## 2025-07-17 NOTE — PROGRESS NOTES
Physical Therapy  Facility/Department: [address]   Physical Therapy Daily Treatment Note    Patient Name: Claire Rubin        MRN: [id number]    : 1955    Date of Service: 2025    Chief Complaint   Patient presents with    Fatigue     Reports generalized weakness     Past Medical History:  has a past medical history of 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  Past Surgical History:  has a past surgical history that includes Hysterectomy, total abdominal (); Cholecystectomy, open (); Appendectomy (); Femur fracture surgery (Left, 2018); and Ovary removal.    Discharge Recommendations  Discharge Recommendations: Patient would benefit from continued therapy after discharge       Assessment  Body Structures, Functions, Activity Limitations Requiring Skilled Therapeutic Intervention: Decreased functional mobility ;Decreased ROM;Decreased strength;Decreased balance;Decreased posture  Assessment: Patient was able to complete some exercise and sit edge of bed.  Pain 8/10.  Recommend further PT to regain functional mobility.     Activity Tolerance  Activity Tolerance: Patient tolerated treatment well  Safety Devices  Type of Devices: Call light within reach, Nurse notified, Left in bed, Bed alarm in place, Patient at risk for falls    AM-PAC  AM-PAC Basic Mobility - Inpatient   How much help is needed turning from your back to your side while in a flat bed without using bedrails?: Total  How much help is needed moving from lying on your back to sitting on the side of a flat bed without using bedrails?: Total  How much help is needed moving to and from a bed to a chair?: Total  How much help is needed standing up from a chair using your arms?: Total  How much help is needed walking in hospital room?: Total  How much help is needed climbing 3-5 steps with a railing?: Total  AM-PAC

## 2025-07-17 NOTE — CARE COORDINATION
Called and spoke with Cristel at [facility] and they will call Tim David again to see patient has any more covered day left for a SNF.  Will need precert.      1447-Spoke with patient about needing more choices as [facility] does not have a bed available.  She suggested that I call her daughter to review the list.    1454-Spoke with patient's daughter, Kori and she will be coming in after work to see the patient and will get us some more choices for SNF referrals.  I have placed the listing from [facility] that is specific for her insurance [employer] at the bedside for them to review.

## 2025-07-17 NOTE — PLAN OF CARE
Problem: Safety - Adult  Goal: Free from fall injury  7/16/2025 2045 by Azalia Corado RN  Outcome: Progressing  Flowsheets (Taken 7/16/2025 2045)  Free From Fall Injury: Instruct family/caregiver on patient safety     Problem: Chronic Conditions and Co-morbidities  Goal: Patient's chronic conditions and co-morbidity symptoms are monitored and maintained or improved  7/16/2025 2045 by Azalia Corado RN  Outcome: Progressing  Flowsheets (Taken 7/16/2025 2045)  Care Plan - Patient's Chronic Conditions and Co-Morbidity Symptoms are Monitored and Maintained or Improved:   Monitor and assess patient's chronic conditions and comorbid symptoms for stability, deterioration, or improvement   Update acute care plan with appropriate goals if chronic or comorbid symptoms are exacerbated and prevent overall improvement and discharge   Collaborate with multidisciplinary team to address chronic and comorbid conditions and prevent exacerbation or deterioration     Problem: Discharge Planning  Goal: Discharge to home or other facility with appropriate resources  7/16/2025 2045 by Azalia Corado RN  Outcome: Progressing  Flowsheets (Taken 7/16/2025 2045)  Discharge to home or other facility with appropriate resources:   Identify barriers to discharge with patient and caregiver   Arrange for needed discharge resources and transportation as appropriate   Identify discharge learning needs (meds, wound care, etc)     Problem: Skin/Tissue Integrity  Goal: Skin integrity remains intact  Description: 1.  Monitor for areas of redness and/or skin breakdown  2.  Assess vascular access sites hourly  3.  Every 4-6 hours minimum:  Change oxygen saturation probe site  4.  Every 4-6 hours:  If on nasal continuous positive airway pressure, respiratory therapy assess nares and determine need for appliance change or resting period  7/16/2025 2045 by Azalia Corado RN  Outcome: Progressing  Flowsheets (Taken 7/16/2025 2045)  Skin

## 2025-07-17 NOTE — PLAN OF CARE
Problem: Safety - Adult  Goal: Free from fall injury  Outcome: Progressing     Problem: Chronic Conditions and Co-morbidities  Goal: Patient's chronic conditions and co-morbidity symptoms are monitored and maintained or improved  Outcome: Progressing  Flowsheets  Taken 7/17/2025 1129  Care Plan - Patient's Chronic Conditions and Co-Morbidity Symptoms are Monitored and Maintained or Improved:   Monitor and assess patient's chronic conditions and comorbid symptoms for stability, deterioration, or improvement   Collaborate with multidisciplinary team to address chronic and comorbid conditions and prevent exacerbation or deterioration   Update acute care plan with appropriate goals if chronic or comorbid symptoms are exacerbated and prevent overall improvement and discharge  Taken 7/17/2025 0755  Care Plan - Patient's Chronic Conditions and Co-Morbidity Symptoms are Monitored and Maintained or Improved:   Collaborate with multidisciplinary team to address chronic and comorbid conditions and prevent exacerbation or deterioration   Monitor and assess patient's chronic conditions and comorbid symptoms for stability, deterioration, or improvement   Update acute care plan with appropriate goals if chronic or comorbid symptoms are exacerbated and prevent overall improvement and discharge     Problem: Discharge Planning  Goal: Discharge to home or other facility with appropriate resources  Outcome: Progressing  Flowsheets  Taken 7/17/2025 1129  Discharge to home or other facility with appropriate resources:   Identify barriers to discharge with patient and caregiver   Arrange for needed discharge resources and transportation as appropriate   Identify discharge learning needs (meds, wound care, etc)   Refer to discharge planning if patient needs post-hospital services based on physician order or complex needs related to functional status, cognitive ability or social support system  Taken 7/17/2025 0755  Discharge to home or

## 2025-07-18 LAB
GLUCOSE BLD-MCNC: 115 MG/DL (ref 65–105)
GLUCOSE BLD-MCNC: 119 MG/DL (ref 65–105)
GLUCOSE BLD-MCNC: 139 MG/DL (ref 65–105)
GLUCOSE BLD-MCNC: 146 MG/DL (ref 65–105)
MICROORGANISM SPEC CULT: NORMAL
MICROORGANISM SPEC CULT: NORMAL
SERVICE CMNT-IMP: NORMAL
SERVICE CMNT-IMP: NORMAL
SPECIMEN DESCRIPTION: NORMAL
SPECIMEN DESCRIPTION: NORMAL

## 2025-07-18 PROCEDURE — 2500000003 HC RX 250 WO HCPCS

## 2025-07-18 PROCEDURE — 99232 SBSQ HOSP IP/OBS MODERATE 35: CPT | Performed by: INTERNAL MEDICINE

## 2025-07-18 PROCEDURE — 97530 THERAPEUTIC ACTIVITIES: CPT

## 2025-07-18 PROCEDURE — 97110 THERAPEUTIC EXERCISES: CPT

## 2025-07-18 PROCEDURE — 6370000000 HC RX 637 (ALT 250 FOR IP)

## 2025-07-18 PROCEDURE — 6370000000 HC RX 637 (ALT 250 FOR IP): Performed by: INTERNAL MEDICINE

## 2025-07-18 PROCEDURE — 97535 SELF CARE MNGMENT TRAINING: CPT

## 2025-07-18 PROCEDURE — 82947 ASSAY GLUCOSE BLOOD QUANT: CPT

## 2025-07-18 PROCEDURE — 6370000000 HC RX 637 (ALT 250 FOR IP): Performed by: SURGERY

## 2025-07-18 PROCEDURE — 2060000000 HC ICU INTERMEDIATE R&B

## 2025-07-18 RX ADMIN — Medication 400 MG: at 08:59

## 2025-07-18 RX ADMIN — SODIUM CHLORIDE, PRESERVATIVE FREE 10 ML: 5 INJECTION INTRAVENOUS at 09:00

## 2025-07-18 RX ADMIN — SODIUM CHLORIDE, PRESERVATIVE FREE 10 ML: 5 INJECTION INTRAVENOUS at 22:07

## 2025-07-18 RX ADMIN — CEFUROXIME AXETIL 500 MG: 250 TABLET, FILM COATED ORAL at 08:59

## 2025-07-18 RX ADMIN — DULOXETINE 30 MG: 30 CAPSULE, DELAYED RELEASE ORAL at 08:58

## 2025-07-18 RX ADMIN — RIVAROXABAN 20 MG: 10 TABLET, FILM COATED ORAL at 08:59

## 2025-07-18 RX ADMIN — PANTOPRAZOLE SODIUM 40 MG: 40 TABLET, DELAYED RELEASE ORAL at 06:47

## 2025-07-18 RX ADMIN — AMLODIPINE BESYLATE 10 MG: 10 TABLET ORAL at 08:59

## 2025-07-18 RX ADMIN — OXYBUTYNIN CHLORIDE 10 MG: 5 TABLET, EXTENDED RELEASE ORAL at 22:06

## 2025-07-18 RX ADMIN — METOPROLOL TARTRATE 25 MG: 25 TABLET, FILM COATED ORAL at 22:06

## 2025-07-18 RX ADMIN — EMPAGLIFLOZIN 10 MG: 10 TABLET, FILM COATED ORAL at 08:59

## 2025-07-18 RX ADMIN — SENNOSIDES, DOCUSATE SODIUM 2 TABLET: 50; 8.6 TABLET, FILM COATED ORAL at 08:59

## 2025-07-18 RX ADMIN — THERA TABS 1 TABLET: TAB at 08:59

## 2025-07-18 RX ADMIN — PYRIDOSTIGMINE BROMIDE 60 MG: 60 TABLET ORAL at 08:58

## 2025-07-18 RX ADMIN — ACETAMINOPHEN 650 MG: 325 TABLET ORAL at 09:04

## 2025-07-18 RX ADMIN — CEFUROXIME AXETIL 500 MG: 250 TABLET, FILM COATED ORAL at 22:07

## 2025-07-18 RX ADMIN — METOPROLOL TARTRATE 25 MG: 25 TABLET, FILM COATED ORAL at 08:59

## 2025-07-18 RX ADMIN — ATORVASTATIN CALCIUM 20 MG: 20 TABLET, FILM COATED ORAL at 22:07

## 2025-07-18 ASSESSMENT — PAIN DESCRIPTION - ORIENTATION: ORIENTATION: MID

## 2025-07-18 ASSESSMENT — PAIN DESCRIPTION - LOCATION: LOCATION: COCCYX

## 2025-07-18 ASSESSMENT — PAIN SCALES - GENERAL: PAINLEVEL_OUTOF10: 6

## 2025-07-18 ASSESSMENT — PAIN DESCRIPTION - DESCRIPTORS: DESCRIPTORS: ACHING;DISCOMFORT

## 2025-07-18 ASSESSMENT — PAIN - FUNCTIONAL ASSESSMENT: PAIN_FUNCTIONAL_ASSESSMENT: PREVENTS OR INTERFERES SOME ACTIVE ACTIVITIES AND ADLS

## 2025-07-18 NOTE — PROGRESS NOTES
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  Past Surgical History:  has a past surgical history that includes Hysterectomy, total abdominal (); Cholecystectomy, open (); Appendectomy (); Femur fracture surgery (Left, 2018); and Ovary removal.    Discharge Recommendations  Discharge Recommendations: Patient would benefit from continued therapy after discharge  OT Equipment Recommendations  Other: resting hand splint for R UE night time wear    Assessment  Performance deficits / Impairments: Decreased functional mobility ;Decreased ADL status;Decreased balance  Assessment: Pt presents to OT this date with above noted deficits. Pt is not currently functioning at [facility] but is making slow progress towards OT goals as now requires Max Ax2 for bed mobility and sat EOB SBA-MIN A 10 mins. At this time Pt does not demonstrate the functional ability to safely return to prior living arrangements. It is recommended that Pt recieve OT services during hospitalization and after discharge to increase safety/ADLs for safe return to previous environment.  Prognosis: Good;Fair  REQUIRES OT FOLLOW-UP: Yes  Activity Tolerance  Activity Tolerance: Patient tolerated treatment well  Activity Tolerance Comments: Pt incontinent and brief change prior to sit EOB.  Safety Devices  Type of Devices: Call light within reach, Gait belt, Patient at risk for falls, Nurse notified, Bed alarm in place, Left in bed    AM-PAC  AM-PAC Daily Activity - Inpatient   How much help is needed for

## 2025-07-18 NOTE — CARE COORDINATION
Referral sent to [facility] as requested by patient.    [facility] has received the referral and are reviewing.    [facility] can accept.    1527-Transport envelope started, will be in office.    1721-Authorization still pending.

## 2025-07-18 NOTE — PROGRESS NOTES
[facility]  Office: [phone]  Sage Waters DO, Donato Valencia, DO, Roberto Carlos Amaral DO, Jomar Valentine, DO, Chery Gandhi MD, Bertha Ryan MD, Eric Pruitt MD, Marcia Burton MD,  Hunter Toscano MD, Kandice Scales MD, Bella Harvey MD,  Balwinder Chung DO, Halley Anthony MD, Parag Plascencia MD, Yan Waters DO, Zaida Tse MD,  Isrrael Yin DO, Tosin Whaley MD, Becky Torrez MD, Laura Olguin MD,  Tam Ordonez MD, Eduardo Stock MD, Neli Laughlin MD, Salazar Dutta MD, Tavon Dominguez MD, Dodie Breen MD, Jenaro Snell, DO, Lilia Brown MD, Mook Miguel DO, Rosas Serrano MD, Balwinder Lanier MD, Mohsin Reza, MD, Mary Edward MD, Shirley Waterhouse, CNP,  Argentina Philippe, CNP, Jenaro Mccabe, CNP,  Peace De La Fuente, DNP, Lakshmi Vee, CNP, Ysabel Watson, CNP, Mira Barillas, CNP, Carmencita Driver, CNP, Gabby Ireland, PA-C, Azra German, CNP, Man Ball, CNP,  Kylee Lanier, CNP, Myrna Tellez, CNP, Brcue Justin, PA-C, Susie Mobley, PA-C, Krystyna Samuels, CNP,        Autumn Ang, CNS, Giselle Encarnacion, CNP, Azalia Chappell, CNP         [facility]   IN-PATIENT SERVICE   [facility]    Progress Note    7/18/2025    8:08 AM    Name:   Claire Rubin  MRN:     [id number]     Acct:      [id number]   Room:   346/346-01   Day:  5  Admit Date:  7/12/2025 10:01 PM    PCP:   Bjorn Yost MD  Code Status:  Full Code    Subjective:     C/C:   Chief Complaint   Patient presents with    Fatigue     Reports generalized weakness     Interval History Status: not changed.     Pt is sitting up in bed.  She denies any fever, cough or chills.   She is waiting for SNF placement    Brief History:     Per the medical record:  \"70 y.o. female is admitted for management of sepsis secondary to sacral wound. She states she has had increased fatigue, worsening pain in her sacral wound, and generalized weakness and lightheadedness for the past several days. She is a

## 2025-07-18 NOTE — PROGRESS NOTES
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  Past Surgical History:  has a past surgical history that includes Hysterectomy, total abdominal (); Cholecystectomy, open (); Appendectomy (); Femur fracture surgery (Left, 2018); and Ovary removal.    Discharge Recommendations  Discharge Recommendations: Therapy recommended at discharge  PT Equipment Recommendations  Equipment Needed: No    Assessment  Body Structures, Functions, Activity Limitations Requiring Skilled Therapeutic Intervention: Decreased functional mobility ;Decreased ADL status;Decreased ROM;Decreased strength;Decreased balance;Decreased posture  Assessment: Pt presents with fatigue, weakness, sepsis due to sacral wound. Pt recently at respite stay at [facility] since  and just released to home few days ago. At baseline, pt requires assist with mechanical stand lift into electric w/c, pt able to maneuver electric w/c around house for ind light meals, dishes and some laundry, requires assist for toileting, she performs assist with UE and trunk ADL's (family dependent with lower body). The patient participated in seated exercises this date for trunk, scapular and LE w/ breathing and upright posture facilitation. During  session, the patient required max x 2 for gaudencio rolling and 2 supine<->sit, and the pt sat on side of bed 10min with SBA/CGA static balance and min assist x 1 dynamic balance. Pt will require 24 hour assist at discharge. Pt will benefit from continued skilled PT for LE  training, Transfer training, Therapeutic activities, Co-Treatment, Safety education & training, Patient/Caregiver education & training, ADL/Self-care training, Endurance training    Goals  Patient Goals   Patient Goals : to return to baseline function and get stronger  Short Term Goals  Time Frame for Short Term Goals: 14 visits  Short Term Goal 1: supine to and from sit min  Short Term Goal 2: sit EOB SBA for ~15'  Short Term Goal 3: use of mechanical lift with one person and min assist    Minutes  PT Individual Minutes  Time In: 1529  Time Out: 1601  Minutes: 32  Time Code Minutes  Timed Code Treatment Minutes: 23 Minutes  Co- treatment with OT warranted secondary to decreased patient safety and independence with functional mobility requiring skilled physical assistance of two professionals to simultaneously address individualized discipline goals. PT is addressing bed mobility, sitting balance and trunk/LE therapeutic exercise, while OT is addressing their individualized functional mobility/self-care task.        Electronically signed by Tasha Brown PT on 7/18/25 at 4:10 PM EDT

## 2025-07-18 NOTE — PLAN OF CARE
Problem: Safety - Adult  Goal: Free from fall injury  7/18/2025 0030 by Marycarmen Archer RN  Outcome: Progressing     Problem: Chronic Conditions and Co-morbidities  Goal: Patient's chronic conditions and co-morbidity symptoms are monitored and maintained or improved  7/18/2025 0030 by Marycarmen Archer RN  Outcome: Progressing  Flowsheets  Taken 7/18/2025 0020  Care Plan - Patient's Chronic Conditions and Co-Morbidity Symptoms are Monitored and Maintained or Improved:   Monitor and assess patient's chronic conditions and comorbid symptoms for stability, deterioration, or improvement   Collaborate with multidisciplinary team to address chronic and comorbid conditions and prevent exacerbation or deterioration   Update acute care plan with appropriate goals if chronic or comorbid symptoms are exacerbated and prevent overall improvement and discharge  Taken 7/17/2025 1945  Care Plan - Patient's Chronic Conditions and Co-Morbidity Symptoms are Monitored and Maintained or Improved:   Monitor and assess patient's chronic conditions and comorbid symptoms for stability, deterioration, or improvement   Collaborate with multidisciplinary team to address chronic and comorbid conditions and prevent exacerbation or deterioration   Update acute care plan with appropriate goals if chronic or comorbid symptoms are exacerbated and prevent overall improvement and discharge     Problem: Discharge Planning  Goal: Discharge to home or other facility with appropriate resources  7/18/2025 0030 by Marycarmen Archer RN  Outcome: Progressing  Flowsheets  Taken 7/18/2025 0020  Discharge to home or other facility with appropriate resources:   Identify barriers to discharge with patient and caregiver   Identify discharge learning needs (meds, wound care, etc)   Arrange for needed discharge resources and transportation as appropriate   Refer to discharge planning if patient needs post-hospital services based on  physician order or complex needs related to functional status, cognitive ability or social support system  Taken 7/17/2025 1945  Discharge to home or other facility with appropriate resources:   Identify barriers to discharge with patient and caregiver   Arrange for needed discharge resources and transportation as appropriate   Identify discharge learning needs (meds, wound care, etc)   Refer to discharge planning if patient needs post-hospital services based on physician order or complex needs related to functional status, cognitive ability or social support system     Problem: Skin/Tissue Integrity  Goal: Skin integrity remains intact  Description: 1.  Monitor for areas of redness and/or skin breakdown  2.  Assess vascular access sites hourly  3.  Every 4-6 hours minimum:  Change oxygen saturation probe site  4.  Every 4-6 hours:  If on nasal continuous positive airway pressure, respiratory therapy assess nares and determine need for appliance change or resting period  7/18/2025 0030 by Marycarmen Archer, RN  Outcome: Progressing  Flowsheets  Taken 7/18/2025 0029  Skin Integrity Remains Intact:   Monitor for areas of redness and/or skin breakdown   Assess vascular access sites hourly   Every 4-6 hours minimum:  Change oxygen saturation probe site   Every 4-6 hours:  If on nasal continuous positive airway pressure, assess nares and determine need for appliance change or resting period   Turn and reposition as indicated   Assess need for specialty bed   Positioning devices   Pressure redistribution bed/mattress (bed type)   Check visual cues for pain  Taken 7/18/2025 0020  Skin Integrity Remains Intact:   Monitor for areas of redness and/or skin breakdown   Assess vascular access sites hourly   Every 4-6 hours:  If on nasal continuous positive airway pressure, assess nares and determine need for appliance change or resting period   Every 4-6 hours minimum:  Change oxygen saturation probe site   Turn and

## 2025-07-18 NOTE — CARE COORDINATION
[facility] in [place] precert started for patient to admit to  [place] and listed as pending.

## 2025-07-18 NOTE — PLAN OF CARE
Problem: Safety - Adult  Goal: Free from fall injury  Outcome: Progressing     Problem: Chronic Conditions and Co-morbidities  Goal: Patient's chronic conditions and co-morbidity symptoms are monitored and maintained or improved  Outcome: Progressing  Flowsheets (Taken 7/18/2025 0400 by Marycarmen Archer, RN)  Care Plan - Patient's Chronic Conditions and Co-Morbidity Symptoms are Monitored and Maintained or Improved:   Monitor and assess patient's chronic conditions and comorbid symptoms for stability, deterioration, or improvement   Collaborate with multidisciplinary team to address chronic and comorbid conditions and prevent exacerbation or deterioration   Update acute care plan with appropriate goals if chronic or comorbid symptoms are exacerbated and prevent overall improvement and discharge     Problem: Discharge Planning  Goal: Discharge to home or other facility with appropriate resources  Outcome: Progressing  Flowsheets (Taken 7/18/2025 0400 by Marycarmen Archer, RN)  Discharge to home or other facility with appropriate resources:   Identify barriers to discharge with patient and caregiver   Arrange for needed discharge resources and transportation as appropriate   Identify discharge learning needs (meds, wound care, etc)   Refer to discharge planning if patient needs post-hospital services based on physician order or complex needs related to functional status, cognitive ability or social support system     Problem: Skin/Tissue Integrity  Goal: Skin integrity remains intact  Description: 1.  Monitor for areas of redness and/or skin breakdown  2.  Assess vascular access sites hourly  3.  Every 4-6 hours minimum:  Change oxygen saturation probe site  4.  Every 4-6 hours:  If on nasal continuous positive airway pressure, respiratory therapy assess nares and determine need for appliance change or resting period  Outcome: Progressing  Flowsheets (Taken 7/18/2025 0400 by Marycarmen Archer

## 2025-07-19 LAB
GLUCOSE BLD-MCNC: 107 MG/DL (ref 65–105)
GLUCOSE BLD-MCNC: 121 MG/DL (ref 65–105)
GLUCOSE BLD-MCNC: 194 MG/DL (ref 65–105)
GLUCOSE BLD-MCNC: 95 MG/DL (ref 65–105)

## 2025-07-19 PROCEDURE — 2500000003 HC RX 250 WO HCPCS

## 2025-07-19 PROCEDURE — 99232 SBSQ HOSP IP/OBS MODERATE 35: CPT | Performed by: INTERNAL MEDICINE

## 2025-07-19 PROCEDURE — 6370000000 HC RX 637 (ALT 250 FOR IP)

## 2025-07-19 PROCEDURE — 6370000000 HC RX 637 (ALT 250 FOR IP): Performed by: INTERNAL MEDICINE

## 2025-07-19 PROCEDURE — 82947 ASSAY GLUCOSE BLOOD QUANT: CPT

## 2025-07-19 PROCEDURE — 6370000000 HC RX 637 (ALT 250 FOR IP): Performed by: SURGERY

## 2025-07-19 PROCEDURE — 2060000000 HC ICU INTERMEDIATE R&B

## 2025-07-19 RX ADMIN — METOPROLOL TARTRATE 25 MG: 25 TABLET, FILM COATED ORAL at 08:54

## 2025-07-19 RX ADMIN — PANTOPRAZOLE SODIUM 40 MG: 40 TABLET, DELAYED RELEASE ORAL at 06:31

## 2025-07-19 RX ADMIN — CEFUROXIME AXETIL 500 MG: 250 TABLET, FILM COATED ORAL at 08:52

## 2025-07-19 RX ADMIN — RIVAROXABAN 20 MG: 10 TABLET, FILM COATED ORAL at 08:53

## 2025-07-19 RX ADMIN — EMPAGLIFLOZIN 10 MG: 10 TABLET, FILM COATED ORAL at 08:53

## 2025-07-19 RX ADMIN — SENNOSIDES, DOCUSATE SODIUM 2 TABLET: 50; 8.6 TABLET, FILM COATED ORAL at 08:51

## 2025-07-19 RX ADMIN — METOPROLOL TARTRATE 25 MG: 25 TABLET, FILM COATED ORAL at 21:06

## 2025-07-19 RX ADMIN — Medication 400 MG: at 08:53

## 2025-07-19 RX ADMIN — DULOXETINE 30 MG: 30 CAPSULE, DELAYED RELEASE ORAL at 08:54

## 2025-07-19 RX ADMIN — THERA TABS 1 TABLET: TAB at 08:55

## 2025-07-19 RX ADMIN — INSULIN LISPRO 2 UNITS: 100 INJECTION, SOLUTION INTRAVENOUS; SUBCUTANEOUS at 12:29

## 2025-07-19 RX ADMIN — CEFUROXIME AXETIL 500 MG: 250 TABLET, FILM COATED ORAL at 21:05

## 2025-07-19 RX ADMIN — AMLODIPINE BESYLATE 10 MG: 10 TABLET ORAL at 08:54

## 2025-07-19 RX ADMIN — ATORVASTATIN CALCIUM 20 MG: 20 TABLET, FILM COATED ORAL at 21:06

## 2025-07-19 RX ADMIN — SODIUM CHLORIDE, PRESERVATIVE FREE 10 ML: 5 INJECTION INTRAVENOUS at 21:07

## 2025-07-19 RX ADMIN — PYRIDOSTIGMINE BROMIDE 60 MG: 60 TABLET ORAL at 08:55

## 2025-07-19 RX ADMIN — OXYBUTYNIN CHLORIDE 10 MG: 5 TABLET, EXTENDED RELEASE ORAL at 21:06

## 2025-07-19 NOTE — PLAN OF CARE
Problem: Chronic Conditions and Co-morbidities  Goal: Patient's chronic conditions and co-morbidity symptoms are monitored and maintained or improved  7/19/2025 1517 by Sean Cyr RN  Outcome: Progressing  Flowsheets (Taken 7/19/2025 0730)  Care Plan - Patient's Chronic Conditions and Co-Morbidity Symptoms are Monitored and Maintained or Improved: Monitor and assess patient's chronic conditions and comorbid symptoms for stability, deterioration, or improvement     Problem: Skin/Tissue Integrity  Goal: Skin integrity remains intact  Description: 1.  Monitor for areas of redness and/or skin breakdown  2.  Assess vascular access sites hourly  3.  Every 4-6 hours minimum:  Change oxygen saturation probe site  4.  Every 4-6 hours:  If on nasal continuous positive airway pressure, respiratory therapy assess nares and determine need for appliance change or resting period  7/19/2025 1517 by Sean Cyr, RN  Outcome: Progressing  Flowsheets (Taken 7/19/2025 0730)  Skin Integrity Remains Intact: Monitor for areas of redness and/or skin breakdown     Problem: ABCDS Injury Assessment  Goal: Absence of physical injury  7/19/2025 1517 by Sean Cyr, RN  Outcome: Progressing  Flowsheets (Taken 7/19/2025 0730)  Absence of Physical Injury: Implement safety measures based on patient assessment

## 2025-07-19 NOTE — PLAN OF CARE
Problem: Safety - Adult  Goal: Free from fall injury  7/19/2025 1501 by Sweta Zavala RN  Outcome: Progressing  Flowsheets (Taken 7/19/2025 0730 by Sean Cyr RN)  Free From Fall Injury: Instruct family/caregiver on patient safety  7/19/2025 0349 by Mahesh Mendoza RN  Outcome: Progressing     Problem: Chronic Conditions and Co-morbidities  Goal: Patient's chronic conditions and co-morbidity symptoms are monitored and maintained or improved  Recent Flowsheet Documentation  Taken 7/19/2025 0730 by Sean Cyr RN  Care Plan - Patient's Chronic Conditions and Co-Morbidity Symptoms are Monitored and Maintained or Improved: Monitor and assess patient's chronic conditions and comorbid symptoms for stability, deterioration, or improvement  7/19/2025 0349 by Mahesh Mendoza RN  Outcome: Progressing     Problem: Discharge Planning  Goal: Discharge to home or other facility with appropriate resources  Recent Flowsheet Documentation  Taken 7/19/2025 0730 by Sean Cyr RN  Discharge to home or other facility with appropriate resources:   Identify barriers to discharge with patient and caregiver   Arrange for needed discharge resources and transportation as appropriate   Identify discharge learning needs (meds, wound care, etc)  7/19/2025 0349 by Mahesh Mendoza RN  Outcome: Progressing     Problem: Skin/Tissue Integrity  Goal: Skin integrity remains intact  Description: 1.  Monitor for areas of redness and/or skin breakdown  2.  Assess vascular access sites hourly  3.  Every 4-6 hours minimum:  Change oxygen saturation probe site  4.  Every 4-6 hours:  If on nasal continuous positive airway pressure, respiratory therapy assess nares and determine need for appliance change or resting period  Recent Flowsheet Documentation  Taken 7/19/2025 0730 by Sean Cyr RN  Skin Integrity Remains Intact: Monitor for areas of redness and/or skin breakdown  7/19/2025 0349 by Mahesh Mendoza RN  Outcome: Progressing     Problem: ABCDS Injury

## 2025-07-19 NOTE — PLAN OF CARE
Problem: Safety - Adult  Goal: Free from fall injury  7/19/2025 0349 by Mahesh Mendoza RN  Outcome: Progressing  7/18/2025 1715 by Ramon Hartman RN  Outcome: Progressing     Problem: Chronic Conditions and Co-morbidities  Goal: Patient's chronic conditions and co-morbidity symptoms are monitored and maintained or improved  7/19/2025 0349 by Mahesh Mendoza RN  Outcome: Progressing  7/18/2025 1715 by Ramon Hartman RN  Outcome: Progressing  Flowsheets  Taken 7/18/2025 0800 by Ramon Hartman RN  Care Plan - Patient's Chronic Conditions and Co-Morbidity Symptoms are Monitored and Maintained or Improved: Monitor and assess patient's chronic conditions and comorbid symptoms for stability, deterioration, or improvement  Taken 7/18/2025 0400 by Marycarmen Archer RN  Care Plan - Patient's Chronic Conditions and Co-Morbidity Symptoms are Monitored and Maintained or Improved:   Monitor and assess patient's chronic conditions and comorbid symptoms for stability, deterioration, or improvement   Collaborate with multidisciplinary team to address chronic and comorbid conditions and prevent exacerbation or deterioration   Update acute care plan with appropriate goals if chronic or comorbid symptoms are exacerbated and prevent overall improvement and discharge     Problem: Discharge Planning  Goal: Discharge to home or other facility with appropriate resources  7/19/2025 0349 by Mahesh Mendoza RN  Outcome: Progressing  7/18/2025 1715 by Ramon Hartman RN  Outcome: Progressing  Flowsheets  Taken 7/18/2025 0800 by Ramon Hartman RN  Discharge to home or other facility with appropriate resources: Identify barriers to discharge with patient and caregiver  Taken 7/18/2025 0400 by Marycarmen Archer RN  Discharge to home or other facility with appropriate resources:   Identify barriers to discharge with patient and caregiver   Arrange for needed discharge resources and transportation as appropriate   Identify  nutritional status  7/19/2025 0349 by Mahesh Mendoza, RN  Outcome: Progressing  7/18/2025 1715 by Ramon Hartman, RN  Outcome: Progressing

## 2025-07-19 NOTE — PROGRESS NOTES
[facility]  Office: [phone]  Sage Waters DO, Donato Valencia, DO, Roberto Carlos Amaral DO, Jomar Valentine, DO, Chery Gandhi MD, Bertha Ryan MD, Eric Pruitt MD, Marica Burton MD,  Hunter Toscano MD, Kandice Scales MD, Bella Harvey MD,  Balwinder Chung DO, Halley Anthony MD, Parag Plascencia MD, Yan Waters DO, Zaida Tse MD,  Isrrael Yin DO, Tosin Whaley MD, Becky Torrez MD, Laura Olguin MD,  Tam Ordonez MD, Eduardo Stock MD, Neli Laughlin MD, Salazar Dutta MD, Tavon Dominguez MD, Dodie Breen MD, Jenaro Snell, DO, Lilia Brown MD, Mook Miguel DO, Rosas Serrano MD, Balwinder Lanier MD, Mohsin Reza, MD, Mary Edward MD, Shirley Waterhouse, CNP,  Argentina Philippe, CNP, Jenaro Mccabe, CNP,  Peace De La Fuente, DNP, Lakshmi Vee, CNP, Ysabel Watson, CNP, Mira Barillas, CNP, Carmencita Drivre, CNP, Gabby Ireland, PA-C, Azra German, CNP, Man Ball, CNP,  Kylee Lanier, CNP, Myrna Tellez, CNP, Bruce Justin, PA-C, Susie Mobley, PA-C, Krystyna Samuels, CNP,        Autumn Ang, CNS, Giselle Encarnacion, CNP, Azalia Chappell, CNP         [facility]   IN-PATIENT SERVICE   [facility]    Progress Note    7/19/2025    7:52 AM    Name:   Claire Rubin  MRN:     [id number]     Acct:      [id number]   Room:   [facility]/346-01   Day:  6  Admit Date:  7/12/2025 10:01 PM    PCP:   Bjorn Yost MD  Code Status:  Full Code    Subjective:     C/C:   Chief Complaint   Patient presents with    Fatigue     Reports generalized weakness     Interval History Status: not changed.     Pt is sitting up in bed.  She denies any fever, cough or chills.  She's had several loose BMs today.   She is waiting for SNF placement    Brief History:     Per the medical record:  \"70 y.o. female is admitted for management of sepsis secondary to sacral wound. She states she has had increased fatigue, worsening pain in her sacral wound, and generalized weakness and lightheadedness

## 2025-07-20 LAB
GLUCOSE BLD-MCNC: 112 MG/DL (ref 65–105)
GLUCOSE BLD-MCNC: 171 MG/DL (ref 65–105)
GLUCOSE BLD-MCNC: 204 MG/DL (ref 65–105)
GLUCOSE BLD-MCNC: 95 MG/DL (ref 65–105)

## 2025-07-20 PROCEDURE — 6370000000 HC RX 637 (ALT 250 FOR IP): Performed by: SURGERY

## 2025-07-20 PROCEDURE — 6370000000 HC RX 637 (ALT 250 FOR IP): Performed by: INTERNAL MEDICINE

## 2025-07-20 PROCEDURE — 6370000000 HC RX 637 (ALT 250 FOR IP)

## 2025-07-20 PROCEDURE — 82947 ASSAY GLUCOSE BLOOD QUANT: CPT

## 2025-07-20 PROCEDURE — 99232 SBSQ HOSP IP/OBS MODERATE 35: CPT | Performed by: INTERNAL MEDICINE

## 2025-07-20 PROCEDURE — 2060000000 HC ICU INTERMEDIATE R&B

## 2025-07-20 PROCEDURE — 2500000003 HC RX 250 WO HCPCS

## 2025-07-20 RX ADMIN — METOPROLOL TARTRATE 25 MG: 25 TABLET, FILM COATED ORAL at 08:33

## 2025-07-20 RX ADMIN — DULOXETINE 30 MG: 30 CAPSULE, DELAYED RELEASE ORAL at 08:33

## 2025-07-20 RX ADMIN — PYRIDOSTIGMINE BROMIDE 60 MG: 60 TABLET ORAL at 08:33

## 2025-07-20 RX ADMIN — PANTOPRAZOLE SODIUM 40 MG: 40 TABLET, DELAYED RELEASE ORAL at 06:45

## 2025-07-20 RX ADMIN — EMPAGLIFLOZIN 10 MG: 10 TABLET, FILM COATED ORAL at 08:33

## 2025-07-20 RX ADMIN — AMLODIPINE BESYLATE 10 MG: 10 TABLET ORAL at 08:33

## 2025-07-20 RX ADMIN — INSULIN LISPRO 2 UNITS: 100 INJECTION, SOLUTION INTRAVENOUS; SUBCUTANEOUS at 17:10

## 2025-07-20 RX ADMIN — ACETAMINOPHEN 650 MG: 325 TABLET ORAL at 14:44

## 2025-07-20 RX ADMIN — METOPROLOL TARTRATE 25 MG: 25 TABLET, FILM COATED ORAL at 21:36

## 2025-07-20 RX ADMIN — RIVAROXABAN 20 MG: 10 TABLET, FILM COATED ORAL at 08:33

## 2025-07-20 RX ADMIN — CEFUROXIME AXETIL 500 MG: 250 TABLET, FILM COATED ORAL at 21:36

## 2025-07-20 RX ADMIN — OXYBUTYNIN CHLORIDE 10 MG: 5 TABLET, EXTENDED RELEASE ORAL at 21:36

## 2025-07-20 RX ADMIN — Medication 400 MG: at 08:33

## 2025-07-20 RX ADMIN — CEFUROXIME AXETIL 500 MG: 250 TABLET, FILM COATED ORAL at 08:33

## 2025-07-20 RX ADMIN — SODIUM CHLORIDE, PRESERVATIVE FREE 10 ML: 5 INJECTION INTRAVENOUS at 21:37

## 2025-07-20 RX ADMIN — ATORVASTATIN CALCIUM 20 MG: 20 TABLET, FILM COATED ORAL at 21:36

## 2025-07-20 RX ADMIN — THERA TABS 1 TABLET: TAB at 08:33

## 2025-07-20 RX ADMIN — SODIUM CHLORIDE, PRESERVATIVE FREE 10 ML: 5 INJECTION INTRAVENOUS at 08:34

## 2025-07-20 ASSESSMENT — PAIN SCALES - GENERAL: PAINLEVEL_OUTOF10: 6

## 2025-07-20 NOTE — PROGRESS NOTES
[facility]  Office: [phone]  Sage Waters DO, Donato Valencia, DO, Roberto Carlos Amaral DO, Jomar Valentine, DO, Chery Gandhi MD, Bertha Ryan MD, Eric Pruitt MD, Marcia Burton MD,  Hunter Toscano MD, Kandice Scales MD, Bella Harvey MD,  Balwinder Chung DO, Halley Anthony MD, Parag Plascencia MD, Yan Waters DO, Zaida Tse MD,  Isrrael Yin DO, Tosin Whaley MD, Becky Torrez MD, Laura Olguin MD,  Tam Ordonez MD, Eduardo Stock MD, Neli Laughlin MD, Salazar Dutta MD, Tavon Dominguez MD, Dodie Breen MD, Jenaro Snell, DO, Lilia Brown MD, Mook Miguel DO, Rosas Serrano MD, Balwinder Lanier MD, Mohsin Reza, MD, Mary Edward MD, Shirley Waterhouse, CNP,  Argentina Philippe, CNP, Jenaro Mccabe, CNP,  Peace De La Fuente, DNP, Lakshmi Vee, CNP, Ysabel Watson, CNP, Mira Barillas, CNP, Carmencita Driver, CNP, Gabby Ireland, PA-C, Azra German, CNP, Man Ball, CNP,  Kylee Lanier, CNP, Myrna Tellez, CNP, Bruce Justin, PA-C, Susie Mobley, PA-C, Krystyna Samuels, CNP,        Autumn Ang, CNS, Giselle Encarnacion, CNP, Azalia Chappell, CNP         [facility]   IN-PATIENT SERVICE   [facility]    Progress Note    7/20/2025    8:07 AM    Name:   Claire Rubin  MRN:     [id number]     Acct:      [id number]   Room:   346/346-01   Day:  7  Admit Date:  7/12/2025 10:01 PM    PCP:   Bjorn Yost MD  Code Status:  Full Code    Subjective:     C/C:   Chief Complaint   Patient presents with    Fatigue     Reports generalized weakness     Interval History Status: not changed.     Pt is sitting up in bed.  She denies any fever, cough or chills.  No more loose BMs today.   She is waiting for SNF placement, precert started.      Brief History:     Per the medical record:  \"70 y.o. female is admitted for management of sepsis secondary to sacral wound. She states she has had increased fatigue, worsening pain in her sacral wound, and generalized weakness and

## 2025-07-20 NOTE — PLAN OF CARE
Problem: Safety - Adult  Goal: Free from fall injury  7/19/2025 2326 by Mahesh Mendoza RN  Outcome: Progressing  7/19/2025 1517 by Sean Cyr RN  Outcome: Progressing  7/19/2025 1501 by Sweta Zavala RN  Outcome: Progressing  Flowsheets (Taken 7/19/2025 0730 by Sean Cyr RN)  Free From Fall Injury: Instruct family/caregiver on patient safety     Problem: Chronic Conditions and Co-morbidities  Goal: Patient's chronic conditions and co-morbidity symptoms are monitored and maintained or improved  7/19/2025 2326 by Mahesh Mendoza RN  Outcome: Progressing  7/19/2025 1517 by Sean Cyr RN  Outcome: Progressing  Flowsheets (Taken 7/19/2025 0730)  Care Plan - Patient's Chronic Conditions and Co-Morbidity Symptoms are Monitored and Maintained or Improved: Monitor and assess patient's chronic conditions and comorbid symptoms for stability, deterioration, or improvement     Problem: Discharge Planning  Goal: Discharge to home or other facility with appropriate resources  7/19/2025 2326 by Mahesh Mendoza RN  Outcome: Progressing  7/19/2025 1517 by Sean Cyr RN  Outcome: Progressing  Flowsheets (Taken 7/19/2025 0730)  Discharge to home or other facility with appropriate resources:   Identify barriers to discharge with patient and caregiver   Arrange for needed discharge resources and transportation as appropriate   Identify discharge learning needs (meds, wound care, etc)     Problem: Skin/Tissue Integrity  Goal: Skin integrity remains intact  Description: 1.  Monitor for areas of redness and/or skin breakdown  2.  Assess vascular access sites hourly  3.  Every 4-6 hours minimum:  Change oxygen saturation probe site  4.  Every 4-6 hours:  If on nasal continuous positive airway pressure, respiratory therapy assess nares and determine need for appliance change or resting period  7/19/2025 2326 by Mahesh Mendoza RN  Outcome: Progressing  7/19/2025 1517 by Sean Cyr RN  Outcome: Progressing  Flowsheets (Taken 7/19/2025

## 2025-07-20 NOTE — PLAN OF CARE
Problem: Safety - Adult  Goal: Free from fall injury  Outcome: Progressing  Flowsheets (Taken 7/19/2025 0730 by Sean Cyr RN)  Free From Fall Injury: Instruct family/caregiver on patient safety  Note: Fall assessment preformed. Bed in low locked position with call light and tray table within reach. Education given. Will continue to monitor.      Problem: Chronic Conditions and Co-morbidities  Goal: Patient's chronic conditions and co-morbidity symptoms are monitored and maintained or improved  Outcome: Progressing  Flowsheets (Taken 7/20/2025 0833)  Care Plan - Patient's Chronic Conditions and Co-Morbidity Symptoms are Monitored and Maintained or Improved:   Monitor and assess patient's chronic conditions and comorbid symptoms for stability, deterioration, or improvement   Collaborate with multidisciplinary team to address chronic and comorbid conditions and prevent exacerbation or deterioration   Update acute care plan with appropriate goals if chronic or comorbid symptoms are exacerbated and prevent overall improvement and discharge     Problem: Discharge Planning  Goal: Discharge to home or other facility with appropriate resources  Outcome: Progressing  Flowsheets (Taken 7/20/2025 0833)  Discharge to home or other facility with appropriate resources:   Identify barriers to discharge with patient and caregiver   Arrange for needed discharge resources and transportation as appropriate   Identify discharge learning needs (meds, wound care, etc)   Arrange for interpreters to assist at discharge as needed   Refer to discharge planning if patient needs post-hospital services based on physician order or complex needs related to functional status, cognitive ability or social support system     Problem: Skin/Tissue Integrity  Goal: Skin integrity remains intact  Description: 1.  Monitor for areas of redness and/or skin breakdown  2.  Assess vascular access sites hourly  3.  Every 4-6 hours minimum:  Change oxygen

## 2025-07-21 LAB
ANION GAP SERPL CALCULATED.3IONS-SCNC: 10 MMOL/L (ref 9–16)
BASOPHILS # BLD: 0 K/UL (ref 0–0.2)
BASOPHILS NFR BLD: 0 % (ref 0–2)
BUN SERPL-MCNC: 14 MG/DL (ref 8–23)
CALCIUM SERPL-MCNC: 8.7 MG/DL (ref 8.6–10.4)
CHLORIDE SERPL-SCNC: 105 MMOL/L (ref 98–107)
CO2 SERPL-SCNC: 26 MMOL/L (ref 20–31)
CREAT SERPL-MCNC: 0.6 MG/DL (ref 0.6–0.9)
EOSINOPHIL # BLD: 0.1 K/UL (ref 0–0.4)
EOSINOPHILS RELATIVE PERCENT: 2 % (ref 1–4)
ERYTHROCYTE [DISTWIDTH] IN BLOOD BY AUTOMATED COUNT: 18.4 % (ref 12.5–15.4)
GFR, ESTIMATED: >90 ML/MIN/1.73M2
GLUCOSE BLD-MCNC: 118 MG/DL (ref 65–105)
GLUCOSE BLD-MCNC: 139 MG/DL (ref 65–105)
GLUCOSE BLD-MCNC: 194 MG/DL (ref 65–105)
GLUCOSE BLD-MCNC: 214 MG/DL (ref 65–105)
GLUCOSE BLD-MCNC: 65 MG/DL (ref 65–105)
GLUCOSE SERPL-MCNC: 102 MG/DL (ref 74–99)
HCT VFR BLD AUTO: 36 % (ref 36–46)
HGB BLD-MCNC: 11.6 G/DL (ref 12–16)
LYMPHOCYTES NFR BLD: 2.2 K/UL (ref 1–4.8)
LYMPHOCYTES RELATIVE PERCENT: 24 % (ref 24–44)
MCH RBC QN AUTO: 28.9 PG (ref 26–34)
MCHC RBC AUTO-ENTMCNC: 32.3 G/DL (ref 31–37)
MCV RBC AUTO: 89.6 FL (ref 80–100)
MONOCYTES NFR BLD: 0.6 K/UL (ref 0.1–1.2)
MONOCYTES NFR BLD: 6 % (ref 2–11)
NEUTROPHILS NFR BLD: 68 % (ref 36–66)
NEUTS SEG NFR BLD: 6.4 K/UL (ref 1.8–7.7)
PLATELET # BLD AUTO: 289 K/UL (ref 140–450)
PMV BLD AUTO: 8.9 FL (ref 6–12)
POTASSIUM SERPL-SCNC: 4.1 MMOL/L (ref 3.7–5.3)
PROCALCITONIN SERPL-MCNC: 0.04 NG/ML (ref 0–0.09)
RBC # BLD AUTO: 4.02 M/UL (ref 4–5.2)
SODIUM SERPL-SCNC: 141 MMOL/L (ref 136–145)
WBC OTHER # BLD: 9.3 K/UL (ref 3.5–11)

## 2025-07-21 PROCEDURE — 6370000000 HC RX 637 (ALT 250 FOR IP): Performed by: INTERNAL MEDICINE

## 2025-07-21 PROCEDURE — 84145 PROCALCITONIN (PCT): CPT

## 2025-07-21 PROCEDURE — 82947 ASSAY GLUCOSE BLOOD QUANT: CPT

## 2025-07-21 PROCEDURE — 36415 COLL VENOUS BLD VENIPUNCTURE: CPT

## 2025-07-21 PROCEDURE — 6370000000 HC RX 637 (ALT 250 FOR IP)

## 2025-07-21 PROCEDURE — 2500000003 HC RX 250 WO HCPCS

## 2025-07-21 PROCEDURE — 6370000000 HC RX 637 (ALT 250 FOR IP): Performed by: SURGERY

## 2025-07-21 PROCEDURE — 99232 SBSQ HOSP IP/OBS MODERATE 35: CPT | Performed by: INTERNAL MEDICINE

## 2025-07-21 PROCEDURE — 85025 COMPLETE CBC W/AUTO DIFF WBC: CPT

## 2025-07-21 PROCEDURE — 97110 THERAPEUTIC EXERCISES: CPT

## 2025-07-21 PROCEDURE — 2060000000 HC ICU INTERMEDIATE R&B

## 2025-07-21 PROCEDURE — 80048 BASIC METABOLIC PNL TOTAL CA: CPT

## 2025-07-21 RX ORDER — AMLODIPINE BESYLATE 5 MG/1
10 TABLET ORAL DAILY
DISCHARGE
Start: 2025-07-21

## 2025-07-21 RX ORDER — NYSTATIN 100000 [USP'U]/G
POWDER TOPICAL
DISCHARGE
Start: 2025-07-21

## 2025-07-21 RX ORDER — CEFUROXIME AXETIL 500 MG/1
500 TABLET ORAL EVERY 12 HOURS SCHEDULED
DISCHARGE
Start: 2025-07-21 | End: 2025-07-23 | Stop reason: HOSPADM

## 2025-07-21 RX ORDER — CYCLOBENZAPRINE HCL 10 MG
10 TABLET ORAL 2 TIMES DAILY PRN
DISCHARGE
Start: 2025-07-21

## 2025-07-21 RX ORDER — FUROSEMIDE 20 MG/1
TABLET ORAL
DISCHARGE
Start: 2025-07-21 | End: 2025-07-23 | Stop reason: HOSPADM

## 2025-07-21 RX ADMIN — PYRIDOSTIGMINE BROMIDE 60 MG: 60 TABLET ORAL at 08:17

## 2025-07-21 RX ADMIN — SODIUM CHLORIDE, PRESERVATIVE FREE 10 ML: 5 INJECTION INTRAVENOUS at 08:17

## 2025-07-21 RX ADMIN — INSULIN LISPRO 2 UNITS: 100 INJECTION, SOLUTION INTRAVENOUS; SUBCUTANEOUS at 21:06

## 2025-07-21 RX ADMIN — DULOXETINE 30 MG: 30 CAPSULE, DELAYED RELEASE ORAL at 08:17

## 2025-07-21 RX ADMIN — METOPROLOL TARTRATE 25 MG: 25 TABLET, FILM COATED ORAL at 21:05

## 2025-07-21 RX ADMIN — RIVAROXABAN 20 MG: 10 TABLET, FILM COATED ORAL at 08:17

## 2025-07-21 RX ADMIN — INSULIN LISPRO 2 UNITS: 100 INJECTION, SOLUTION INTRAVENOUS; SUBCUTANEOUS at 12:40

## 2025-07-21 RX ADMIN — ATORVASTATIN CALCIUM 20 MG: 20 TABLET, FILM COATED ORAL at 21:06

## 2025-07-21 RX ADMIN — THERA TABS 1 TABLET: TAB at 08:17

## 2025-07-21 RX ADMIN — Medication 400 MG: at 08:17

## 2025-07-21 RX ADMIN — AMLODIPINE BESYLATE 10 MG: 10 TABLET ORAL at 08:17

## 2025-07-21 RX ADMIN — Medication 16 G: at 16:45

## 2025-07-21 RX ADMIN — CEFUROXIME AXETIL 500 MG: 250 TABLET, FILM COATED ORAL at 08:17

## 2025-07-21 RX ADMIN — PANTOPRAZOLE SODIUM 40 MG: 40 TABLET, DELAYED RELEASE ORAL at 06:58

## 2025-07-21 RX ADMIN — EMPAGLIFLOZIN 10 MG: 10 TABLET, FILM COATED ORAL at 08:17

## 2025-07-21 RX ADMIN — OXYBUTYNIN CHLORIDE 10 MG: 5 TABLET, EXTENDED RELEASE ORAL at 21:05

## 2025-07-21 RX ADMIN — METOPROLOL TARTRATE 25 MG: 25 TABLET, FILM COATED ORAL at 08:17

## 2025-07-21 NOTE — PLAN OF CARE
Problem: Safety - Adult  Goal: Free from fall injury  Outcome: Progressing     Problem: Chronic Conditions and Co-morbidities  Goal: Patient's chronic conditions and co-morbidity symptoms are monitored and maintained or improved  Outcome: Progressing     Problem: Discharge Planning  Goal: Discharge to home or other facility with appropriate resources  Outcome: Progressing     Problem: Skin/Tissue Integrity  Goal: Skin integrity remains intact  Description: 1.  Monitor for areas of redness and/or skin breakdown  2.  Assess vascular access sites hourly  3.  Every 4-6 hours minimum:  Change oxygen saturation probe site  4.  Every 4-6 hours:  If on nasal continuous positive airway pressure, respiratory therapy assess nares and determine need for appliance change or resting period  Outcome: Progressing     Problem: ABCDS Injury Assessment  Goal: Absence of physical injury  Outcome: Progressing     Problem: Pain  Goal: Verbalizes/displays adequate comfort level or baseline comfort level  Outcome: Progressing     Problem: Nutrition Deficit:  Goal: Optimize nutritional status  Outcome: Progressing

## 2025-07-21 NOTE — PROGRESS NOTES
[facility]  Progress Note      NAME:  Claire Rubin  MEDICAL RECORD NUMBER:  [id number]  AGE: 70 y.o.   GENDER: female  : 1955  TODAY'S DATE:  2025    Planned WOC nurse visit this day for planned NPWT dressing change. Concern over increased odor coming from wound. General surgery was asked to re-eval by primary service. Dr. Alonso has seen the patient and vac therapy is on hold currently and may resume previous dressing of santyl vs resumption of vac therapy.  [facility] nursing will continue to follow while admitted.    For concerns, the [facility] nursing team can be reached via [employer] by searching \"wound ostomy\" under groups and choosing the [facility] option Monday - Friday 8am-4pm.      Trinidad Murray MSN, RN  [facility]  Wound and Ostomy Service  [facility]  [phone]

## 2025-07-21 NOTE — CARE COORDINATION
Availity authorization for [facility] remains pending (started ). Message sent to update them. Has wound vac ([employer]) to coccyx in place. Wound vac RN note states:    If NPWT suction fails or patient is discharged to facility:  - Remove vac dressing  - Cleanse wound with normal saline   - Pack wound with saline moistened gauze and change every 12 hours   -notify WOC RN    1524: Availity precert remains pending.     1543: Message received from CORKY Ruth. States message was left with intent to deny SNF admission to [facility]. CORKY returned call and spoke with Arlyn at [phone]. Intent to deny is due to PT notes stating patient is paraplegic, and wound care stating she will not need wound vac at discharge. States final determination will be faxed over once available. States peer to peer offer will  on . Dr. Ryan perfectserved and updated.     1604: Secure message sent to Dr. Forrester though email to request peer to peer. Still awaiting information for peer to peer, as it has not yet been received via fax. Information to be sent to Dr. Forrester once arrived.

## 2025-07-21 NOTE — CARE COORDINATION
Messages retrieved from phone that had been left at [phone] and [phone] from Arlyn at [facility] about Peer to Peer options that would be sent to our fax which I do not see on the fax maching.  Patient was denied stay at [facility], informed Oliva, patient's discharge planner today.

## 2025-07-21 NOTE — PLAN OF CARE
Problem: Safety - Adult  Goal: Free from fall injury  7/20/2025 2333 by Mahesh Mendoza RN  Outcome: Progressing  7/20/2025 1730 by Bailey Jennings RN  Outcome: Progressing  Flowsheets (Taken 7/19/2025 0730 by Sean Cyr, RN)  Free From Fall Injury: Instruct family/caregiver on patient safety  Note: Fall assessment preformed. Bed in low locked position with call light and tray table within reach. Education given. Will continue to monitor.      Problem: Chronic Conditions and Co-morbidities  Goal: Patient's chronic conditions and co-morbidity symptoms are monitored and maintained or improved  7/20/2025 2333 by Mahesh Mendoza RN  Outcome: Progressing  7/20/2025 1730 by Bailey Jennings RN  Outcome: Progressing  Flowsheets (Taken 7/20/2025 0833)  Care Plan - Patient's Chronic Conditions and Co-Morbidity Symptoms are Monitored and Maintained or Improved:   Monitor and assess patient's chronic conditions and comorbid symptoms for stability, deterioration, or improvement   Collaborate with multidisciplinary team to address chronic and comorbid conditions and prevent exacerbation or deterioration   Update acute care plan with appropriate goals if chronic or comorbid symptoms are exacerbated and prevent overall improvement and discharge     Problem: Discharge Planning  Goal: Discharge to home or other facility with appropriate resources  7/20/2025 2333 by Mahesh Mendoza RN  Outcome: Progressing  7/20/2025 1730 by Bailey Jennings RN  Outcome: Progressing  Flowsheets (Taken 7/20/2025 0833)  Discharge to home or other facility with appropriate resources:   Identify barriers to discharge with patient and caregiver   Arrange for needed discharge resources and transportation as appropriate   Identify discharge learning needs (meds, wound care, etc)   Arrange for interpreters to assist at discharge as needed   Refer to discharge planning if patient needs post-hospital services based on physician order or complex needs related to

## 2025-07-21 NOTE — PROGRESS NOTES
General surgery    Asked to see patient's decubitus again.  On her initial presentation this was a stage I-II with the edges in the stage II centrally.  Had had good granulation tissue except for the very center.    Patient was initially on gauze and collagenase dressings.  This was switched to a VAC dressing.  This is again changed about 2 times a week.  She was due for change tomorrow.    Primary service had noticed some increasing smell and we are asked to see the patient again.  VAC dressing removed.  This had been left in place for about 4-1/2 to 5 days and a bit of odor for that length is not unexpected.  Patient has a bit of tape suction irritation on her left hip.  The outer edges of the wound looked excellent with much better granulation tissue from the VAC since she had been admitted.  The central portion of the wound however looks worse and this is not going deeper to about a stage III.  Some sharp debridement was done at bedside with good tolerance by the patient will need to continue to pluck hair intermittently as dead tissue recurs.    Left gauze dressing in place today.  Will recheck again tomorrow.  If VAC appears to have failed may need to go back to gauze with collagenase.  Continue offloading  Continue with nutritional supplementation    Patient's right heel looks improved with foam dressing and elevation of her heels off of the bed.

## 2025-07-21 NOTE — PROGRESS NOTES
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  Past Surgical History:  has a past surgical history that includes Hysterectomy, total abdominal (); Cholecystectomy, open (); Appendectomy (); Femur fracture surgery (Left, 2018); and Ovary removal.     Discharge Recommendations  Discharge Recommendations: Therapy recommended at discharge  PT Equipment Recommendations  Equipment Needed: No     Assessment  Body Structures, Functions, Activity Limitations Requiring Skilled Therapeutic Intervention: Decreased functional mobility ;Decreased ADL status;Decreased ROM;Decreased strength;Decreased balance;Decreased posture  Assessment: Pt presents with fatigue, weakness, sepsis due to sacral wound. Pt recently at respite stay at [facility] since  and just released to home few days ago. At baseline, pt requires assist with mechanical stand lift into electric w/c, pt able to maneuver electric w/c around house for ind light meals, dishes and some laundry, requires assist for toileting, she performs assist with UE and trunk ADL's (family dependent with lower body). The patient participated in BLE stretching exercises this date. During previous session, the patient required max x 2 for gaudencio rolling, was dependent x 2 supine<->sit, and the pt sat on side of bed with CGA static balance and min assist x 1 dynamic balance. Pt will require 24 hour assist at discharge. Pt will benefit from continued skilled PT for LE stretching, trunk strengthening and  mobility  Bed Mobility Comments: bed exercises only this date     Balance   Balance  Posture: Poor     Exercise  PT Exercises  Exercise Treatment: Supine gaudencio LE ROM and stretching all available planes x 10-12 reps with prolonged achilles stretching      Patient Education  Patient Education  Education Given To: Patient  Education Provided: Role of Therapy;Plan of Care  Education Method: Verbal  Barriers to Learning: None  Education Outcome: Verbalized understanding     Plan  Physical Therapy Plan  General Plan: 4-5 times per week  Current Treatment Recommendations: Strengthening, ROM, Balance training, Functional mobility training, Transfer training, Therapeutic activities, Co-Treatment, Safety education & training, Patient/Caregiver education & training, ADL/Self-care training, Endurance training     Goals  Patient Goals   Patient Goals : to return to baseline function and get stronger  Short Term Goals  Time Frame for Short Term Goals: 14 visits  Short Term Goal 1: supine to and from sit min  Short Term Goal 2: sit EOB SBA for ~15'  Short Term Goal 3: use of mechanical lift with one person and min assist     Minutes  PT Individual Minutes  Time In: 1100  Time Out: 1123  Minutes: 23  Time Code Minutes  Timed Code Treatment Minutes: 23 Minutes     Mary Ya, PT

## 2025-07-22 PROBLEM — I48.20 CHRONIC ATRIAL FIBRILLATION (HCC): Status: RESOLVED | Noted: 2018-09-20 | Resolved: 2025-07-22

## 2025-07-22 PROBLEM — L89.159 PRESSURE INJURY OF SKIN OF SACRAL REGION: Status: ACTIVE | Noted: 2025-07-22

## 2025-07-22 PROBLEM — N30.00 ACUTE CYSTITIS WITHOUT HEMATURIA: Status: ACTIVE | Noted: 2025-07-22

## 2025-07-22 LAB
ANION GAP SERPL CALCULATED.3IONS-SCNC: 11 MMOL/L (ref 9–16)
BASOPHILS # BLD: 0 K/UL (ref 0–0.2)
BASOPHILS NFR BLD: 0 % (ref 0–2)
BUN SERPL-MCNC: 12 MG/DL (ref 8–23)
CALCIUM SERPL-MCNC: 9.1 MG/DL (ref 8.6–10.4)
CHLORIDE SERPL-SCNC: 103 MMOL/L (ref 98–107)
CO2 SERPL-SCNC: 27 MMOL/L (ref 20–31)
CREAT SERPL-MCNC: 0.5 MG/DL (ref 0.6–0.9)
EOSINOPHIL # BLD: 0.1 K/UL (ref 0–0.4)
EOSINOPHILS RELATIVE PERCENT: 2 % (ref 1–4)
ERYTHROCYTE [DISTWIDTH] IN BLOOD BY AUTOMATED COUNT: 18.3 % (ref 12.5–15.4)
GFR, ESTIMATED: >90 ML/MIN/1.73M2
GLUCOSE BLD-MCNC: 127 MG/DL (ref 65–105)
GLUCOSE BLD-MCNC: 133 MG/DL (ref 65–105)
GLUCOSE BLD-MCNC: 147 MG/DL (ref 65–105)
GLUCOSE BLD-MCNC: 199 MG/DL (ref 65–105)
GLUCOSE SERPL-MCNC: 147 MG/DL (ref 74–99)
HCT VFR BLD AUTO: 37.1 % (ref 36–46)
HGB BLD-MCNC: 12.1 G/DL (ref 12–16)
LYMPHOCYTES NFR BLD: 2.2 K/UL (ref 1–4.8)
LYMPHOCYTES RELATIVE PERCENT: 24 % (ref 24–44)
MAGNESIUM SERPL-MCNC: 1.6 MG/DL (ref 1.6–2.4)
MCH RBC QN AUTO: 28.9 PG (ref 26–34)
MCHC RBC AUTO-ENTMCNC: 32.7 G/DL (ref 31–37)
MCV RBC AUTO: 88.2 FL (ref 80–100)
MONOCYTES NFR BLD: 0.5 K/UL (ref 0.1–1.2)
MONOCYTES NFR BLD: 5 % (ref 2–11)
NEUTROPHILS NFR BLD: 69 % (ref 36–66)
NEUTS SEG NFR BLD: 6.2 K/UL (ref 1.8–7.7)
PLATELET # BLD AUTO: 310 K/UL (ref 140–450)
PMV BLD AUTO: 8.8 FL (ref 6–12)
POTASSIUM SERPL-SCNC: 4.2 MMOL/L (ref 3.7–5.3)
RBC # BLD AUTO: 4.21 M/UL (ref 4–5.2)
SODIUM SERPL-SCNC: 141 MMOL/L (ref 136–145)
TROPONIN I SERPL HS-MCNC: 34 NG/L (ref 0–14)
WBC OTHER # BLD: 9 K/UL (ref 3.5–11)

## 2025-07-22 PROCEDURE — F08G5YZ WOUND MANAGEMENT TREATMENT OF INTEGUMENTARY SYSTEM - LOWER BACK / LOWER EXTREMITY USING OTHER EQUIPMENT: ICD-10-PCS | Performed by: SURGERY

## 2025-07-22 PROCEDURE — 82947 ASSAY GLUCOSE BLOOD QUANT: CPT

## 2025-07-22 PROCEDURE — 6370000000 HC RX 637 (ALT 250 FOR IP): Performed by: INTERNAL MEDICINE

## 2025-07-22 PROCEDURE — 36415 COLL VENOUS BLD VENIPUNCTURE: CPT

## 2025-07-22 PROCEDURE — 6370000000 HC RX 637 (ALT 250 FOR IP): Performed by: STUDENT IN AN ORGANIZED HEALTH CARE EDUCATION/TRAINING PROGRAM

## 2025-07-22 PROCEDURE — 80048 BASIC METABOLIC PNL TOTAL CA: CPT

## 2025-07-22 PROCEDURE — 83735 ASSAY OF MAGNESIUM: CPT

## 2025-07-22 PROCEDURE — 6370000000 HC RX 637 (ALT 250 FOR IP): Performed by: SURGERY

## 2025-07-22 PROCEDURE — 85025 COMPLETE CBC W/AUTO DIFF WBC: CPT

## 2025-07-22 PROCEDURE — 97608 NEG PRS WND THER NDME>50SQCM: CPT

## 2025-07-22 PROCEDURE — 99232 SBSQ HOSP IP/OBS MODERATE 35: CPT | Performed by: STUDENT IN AN ORGANIZED HEALTH CARE EDUCATION/TRAINING PROGRAM

## 2025-07-22 PROCEDURE — 6370000000 HC RX 637 (ALT 250 FOR IP)

## 2025-07-22 PROCEDURE — 2060000000 HC ICU INTERMEDIATE R&B

## 2025-07-22 PROCEDURE — 84484 ASSAY OF TROPONIN QUANT: CPT

## 2025-07-22 RX ORDER — SENNA AND DOCUSATE SODIUM 50; 8.6 MG/1; MG/1
2 TABLET, FILM COATED ORAL DAILY
Status: DISCONTINUED | OUTPATIENT
Start: 2025-07-22 | End: 2025-07-25 | Stop reason: HOSPADM

## 2025-07-22 RX ORDER — MAGNESIUM SULFATE IN WATER 40 MG/ML
2000 INJECTION, SOLUTION INTRAVENOUS ONCE
Status: DISCONTINUED | OUTPATIENT
Start: 2025-07-22 | End: 2025-07-22

## 2025-07-22 RX ORDER — LANOLIN ALCOHOL/MO/W.PET/CERES
400 CREAM (GRAM) TOPICAL ONCE
Status: COMPLETED | OUTPATIENT
Start: 2025-07-22 | End: 2025-07-22

## 2025-07-22 RX ADMIN — SENNOSIDES AND DOCUSATE SODIUM 2 TABLET: 50; 8.6 TABLET ORAL at 13:23

## 2025-07-22 RX ADMIN — METOPROLOL TARTRATE 25 MG: 25 TABLET, FILM COATED ORAL at 21:57

## 2025-07-22 RX ADMIN — EMPAGLIFLOZIN 10 MG: 10 TABLET, FILM COATED ORAL at 07:33

## 2025-07-22 RX ADMIN — DULOXETINE 30 MG: 30 CAPSULE, DELAYED RELEASE ORAL at 07:33

## 2025-07-22 RX ADMIN — OXYBUTYNIN CHLORIDE 10 MG: 5 TABLET, EXTENDED RELEASE ORAL at 21:57

## 2025-07-22 RX ADMIN — Medication 400 MG: at 13:35

## 2025-07-22 RX ADMIN — INSULIN LISPRO 2 UNITS: 100 INJECTION, SOLUTION INTRAVENOUS; SUBCUTANEOUS at 21:57

## 2025-07-22 RX ADMIN — ATORVASTATIN CALCIUM 20 MG: 20 TABLET, FILM COATED ORAL at 21:57

## 2025-07-22 RX ADMIN — AMLODIPINE BESYLATE 10 MG: 10 TABLET ORAL at 07:33

## 2025-07-22 RX ADMIN — METOPROLOL TARTRATE 25 MG: 25 TABLET, FILM COATED ORAL at 07:33

## 2025-07-22 RX ADMIN — RIVAROXABAN 20 MG: 10 TABLET, FILM COATED ORAL at 07:33

## 2025-07-22 RX ADMIN — ACETAMINOPHEN 650 MG: 325 TABLET ORAL at 20:18

## 2025-07-22 RX ADMIN — PYRIDOSTIGMINE BROMIDE 60 MG: 60 TABLET ORAL at 07:33

## 2025-07-22 RX ADMIN — ACETAMINOPHEN 650 MG: 325 TABLET ORAL at 07:34

## 2025-07-22 RX ADMIN — Medication 400 MG: at 07:33

## 2025-07-22 RX ADMIN — PANTOPRAZOLE SODIUM 40 MG: 40 TABLET, DELAYED RELEASE ORAL at 06:25

## 2025-07-22 RX ADMIN — THERA TABS 1 TABLET: TAB at 07:33

## 2025-07-22 ASSESSMENT — PAIN SCALES - GENERAL
PAINLEVEL_OUTOF10: 2
PAINLEVEL_OUTOF10: 6
PAINLEVEL_OUTOF10: 2

## 2025-07-22 ASSESSMENT — PAIN DESCRIPTION - ONSET: ONSET: ON-GOING

## 2025-07-22 ASSESSMENT — PAIN DESCRIPTION - DESCRIPTORS: DESCRIPTORS: SORE;PRESSURE

## 2025-07-22 ASSESSMENT — PAIN DESCRIPTION - FREQUENCY: FREQUENCY: CONTINUOUS

## 2025-07-22 ASSESSMENT — PAIN DESCRIPTION - LOCATION: LOCATION: SACRUM

## 2025-07-22 ASSESSMENT — PAIN DESCRIPTION - PAIN TYPE: TYPE: CHRONIC PAIN

## 2025-07-22 ASSESSMENT — PAIN DESCRIPTION - ORIENTATION: ORIENTATION: LEFT

## 2025-07-22 NOTE — PLAN OF CARE
Problem: Skin/Tissue Integrity  Goal: Skin integrity remains intact  Description: 1.  Monitor for areas of redness and/or skin breakdown  2.  Assess vascular access sites hourly  3.  Every 4-6 hours minimum:  Change oxygen saturation probe site  4.  Every 4-6 hours:  If on nasal continuous positive airway pressure, respiratory therapy assess nares and determine need for appliance change or resting period  7/21/2025 2233 by Pita Lawrence RN  Outcome: Not Progressing  Flowsheets (Taken 7/21/2025 2219)  Skin Integrity Remains Intact:   Monitor for areas of redness and/or skin breakdown   Assess vascular access sites hourly  7/21/2025 1532 by Charito Hyde RN  Outcome: Progressing     Problem: Safety - Adult  Goal: Free from fall injury  7/21/2025 2233 by Pita Lawrence RN  Outcome: Progressing  7/21/2025 1532 by Charito Hyde RN  Outcome: Progressing     Problem: Chronic Conditions and Co-morbidities  Goal: Patient's chronic conditions and co-morbidity symptoms are monitored and maintained or improved  7/21/2025 2233 by Pita Lawrence RN  Outcome: Progressing  Flowsheets (Taken 7/21/2025 2219)  Care Plan - Patient's Chronic Conditions and Co-Morbidity Symptoms are Monitored and Maintained or Improved:   Monitor and assess patient's chronic conditions and comorbid symptoms for stability, deterioration, or improvement   Collaborate with multidisciplinary team to address chronic and comorbid conditions and prevent exacerbation or deterioration  7/21/2025 1532 by Charito Hyde RN  Outcome: Progressing     Problem: Discharge Planning  Goal: Discharge to home or other facility with appropriate resources  7/21/2025 2233 by Pita Lawrence RN  Outcome: Progressing  Flowsheets (Taken 7/21/2025 2219)  Discharge to home or other facility with appropriate resources:   Identify barriers to discharge with patient and caregiver   Arrange for needed discharge resources and transportation as appropriate   Identify discharge

## 2025-07-22 NOTE — PROGRESS NOTES
[facility]  Office: [phone]  Sage Waters DO, Donato Valencia, DO, Roberto Carlos Amaral DO, Jomar Valentine, DO, Chery Gandhi MD, Bertha Ryan MD, Eric Pruitt MD, Marcia Burton MD,  Hunter Toscano MD, Kandice Scales MD, Bella Harvey MD,  Balwinder Chung DO, Halley Anthony MD, Parag Plascencia MD, Yan Waters DO, Zaida Tse MD,  Isrrael Yin DO, Tosin Whaley MD, Becky Torrez MD, Laura Olguin MD,  Tam Ordonez MD, Eduardo Stock MD, Neli Laughlin MD, Salazar Dutta MD, Tavon Dominguez MD, Dodie Breen MD, Jenaro Snell, DO, Lilia Brown MD, Mook Miguel DO, Rosas Serrano MD, Balwinder Lanier MD, Mohsin Reza, MD, Mary Edward MD, Shirley Waterhouse, CNP,  Argentina Philippe, CNP, Jenaro Mccabe, CNP,  Peace De La Fuente, DNP, Lakshmi Vee, CNP, Ysabel Watson, CNP, Mira Barillas, CNP, Carmencita Driver, CNP, Gabby Ireland, PA-C, Azra German, CNP, Man Ball, CNP,  Kylee Lanier, CNP, Myrna Tellez, CNP, Bruce Justin, PA-C, Susie Mobley, PA-C, Krystyna Samuels, CNP,        Autumn Ang, CNS, Giselle Encarnacion, CNP, Azalia Chappell, CNP         [facility]   IN-PATIENT SERVICE   [facility]    Progress Note    7/22/2025    5:12 PM    Name:   Claire Rubin  MRN:     [id number]     Acct:      [id number]   Room:   [facility] Day:  9  Admit Date:  7/12/2025 10:01 PM    PCP:   Bjorn Yost MD  Code Status:  Full Code    Subjective:     C/C:   Chief Complaint   Patient presents with    Fatigue     Reports generalized weakness     Interval History Status: not changed.   Seen at bedside, hemodynamically stable  Did not have a bowel movement  Labs reviewed  Awaiting placement    Brief History:     Per the medical record:  \"70 y.o. female is admitted for management of sepsis secondary to sacral wound. She states she has had increased fatigue, worsening pain in her sacral wound, and generalized weakness and lightheadedness for the past several days.  She is a paraplegic and is bed bound at baseline. She recently was discharged from [facility] yesterday morning and is typically cared for by daughter at home. They do not have home health. Denies any fevers, chills, chest pain, shortness of breath, abdominal pain, nausea, vomiting, dysuria, hematuria, change in bowel habits. \"    Medications:     Allergies:    Allergies   Allergen Reactions    Compazine [Prochlorperazine]      Cause neurological sx    Sulfa Antibiotics      Childhood allergy       Current Meds:   Scheduled Meds:    sennosides-docusate sodium  2 tablet Oral Daily    amLODIPine  10 mg Oral Daily    magnesium oxide  400 mg Oral Daily    multivitamin  1 tablet Oral Daily    atorvastatin  20 mg Oral Nightly    DULoxetine  30 mg Oral Daily    empagliflozin  10 mg Oral Daily    metoprolol tartrate  25 mg Oral BID    pantoprazole  40 mg Oral QAM AC    oxyBUTYnin  10 mg Oral Nightly    pyRIDostigmine  60 mg Oral Daily    rivaroxaban  20 mg Oral Daily with breakfast    [Held by provider] QUEtiapine  50 mg Oral Nightly    sodium chloride flush  5-40 mL IntraVENous 2 times per day    insulin lispro  0-8 Units SubCUTAneous 4x Daily AC & HS     Continuous Infusions:    sodium chloride      dextrose       PRN Meds: potassium chloride **OR** potassium alternative oral replacement **OR** potassium chloride, magnesium sulfate, [Held by provider] cyclobenzaprine, sodium chloride flush, sodium chloride, acetaminophen **OR** acetaminophen, glucose, dextrose bolus **OR** dextrose bolus, glucagon (rDNA), dextrose    Data:     Past Medical History:   has a past medical history of 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    Social History:   reports that she has never smoked. She has never used smokeless tobacco. She reports that she does not drink alcohol and does not use drugs.     Family History: History

## 2025-07-22 NOTE — CARE COORDINATION
920am- CM received fax from [employer] stating intent to deny auth, peer to peer requested. CM sent email to Dr Forrester requesting peer to peer, fax states it must be completed by 7/23/25 12pm EST.     100am- Email received from Dr Forrester stating she will complete the Peer to peer. Number to call provided in email [phone]. 1400- Email sent to Dr Forrester to check status of peer to peer.

## 2025-07-22 NOTE — PROGRESS NOTES
Patient and nursing staff open for discharge to rehab today.  Patient otherwise without any complaints.      With assistance of nurses patient logrolled over.  Right heel area looks better with less erythematous compared to admission and continue with elevation of the bed and foam padded dressings.  She now has these on both sides.    Dressing taken down from sacral area.  This looks  and no smell as compared to yesterday.  Central area may still require slow gradual occasional debridement but nothing loose today.  If patient goes to rehab today VAC dressing is to be applied after discharge and will continue moist gauze until then.  If VAC is ineffective and patient's wound is not improving they may have to go back to Santyl collagenase instead.    Discussed with patient that she can follow-up with Dr. Gamino as an outpatient and they can coordinate with the wound nurse at her rehab facility.  He could see the patient either in the office and he also staffs the wound care center at [facility] on Friday mornings as well.

## 2025-07-22 NOTE — PLAN OF CARE
Problem: Safety - Adult  Goal: Free from fall injury  Outcome: Progressing     Problem: Chronic Conditions and Co-morbidities  Goal: Patient's chronic conditions and co-morbidity symptoms are monitored and maintained or improved  Outcome: Progressing     Problem: Discharge Planning  Goal: Discharge to home or other facility with appropriate resources  Outcome: Progressing     Problem: Skin/Tissue Integrity  Goal: Skin integrity remains intact  Description: 1.  Monitor for areas of redness and/or skin breakdown  2.  Assess vascular access sites hourly  3.  Every 4-6 hours minimum:  Change oxygen saturation probe site  4.  Every 4-6 hours:  If on nasal continuous positive airway pressure, respiratory therapy assess nares and determine need for appliance change or resting period  Outcome: Progressing     Problem: ABCDS Injury Assessment  Goal: Absence of physical injury  Outcome: Progressing     Problem: Pain  Goal: Verbalizes/displays adequate comfort level or baseline comfort level  Outcome: Progressing     Problem: Nutrition Deficit:  Goal: Optimize nutritional status  Outcome: Progressing     Problem: Neurosensory - Adult  Goal: Achieves maximal functionality and self care  Outcome: Progressing     Problem: Cardiovascular - Adult  Goal: Absence of cardiac dysrhythmias or at baseline  Outcome: Progressing     Problem: Skin/Tissue Integrity - Adult  Goal: Skin integrity remains intact  Description: 1.  Monitor for areas of redness and/or skin breakdown  2.  Assess vascular access sites hourly  3.  Every 4-6 hours minimum:  Change oxygen saturation probe site  4.  Every 4-6 hours:  If on nasal continuous positive airway pressure, respiratory therapy assess nares and determine need for appliance change or resting period  Outcome: Progressing  Goal: Incisions, wounds, or drain sites healing without S/S of infection  Outcome: Progressing     Problem: Musculoskeletal - Adult  Goal: Return mobility to safest level of

## 2025-07-22 NOTE — PROGRESS NOTES
[facility]  Progress Note      NAME:  Claire Rubin  MEDICAL RECORD NUMBER:  [id number]  AGE: 70 y.o.   GENDER: female  : 1955  TODAY'S DATE:  2025    [facility] nursing to bedside for NPWT dressing placement. Per surgery, vac therapy is to be resumed until patient has SNF placement set up. Old dressing of santyl and gauze removed. Wound cleansed with saline. Mari-wound prepped with hydrocolloid dressing and skin barrier wipe. 2 pieces of black foam applied to wound bed and 1 piece to bridge to the left hip. Continuous suction obtained and maintained. Will plan for NPWT dressing change on Friday, , per WOC RN.     If suction fails or patient is discharged:  - Remove vac dressing  - Cleanse wound with normal saline   - Pack wound with saline moistened gauze and change every 12 hours    If patient is discharged to facility, facility vac will be applied upon admission.     Measurements:  Negative Pressure Wound Therapy Coccyx (Active)   Dressing Status Intact w/seal 25 1515   Canister changed? Yes 25 1515   Output (ml) 0 ml 25 1945   Unit Type KCI Ulta 25 1515   Mode Continuous 25 1515   Target Pressure (mmHg) 125 25 1515   Intensity Low 25 1515   $$ Dressing Changed & Charged $ Standard NPWT >50 sq cm PER TX 25 1515   Number of pieces placed 3 25 1515   Dressing Type Black foam 25 1515   Dressing Change Due 25 1515   Number of days: 5       Wound 25 Coccyx (Active)   Wound Image   25 1515   Wound Etiology Pressure Unstageable 25 1515   Dressing Status New dressing applied 25 1515   Wound Cleansed Cleansed with saline 25 1515   Dressing/Treatment Negative pressure wound therapy 25 1515   Dressing Change Due 25 1515   Wound Length (cm) 6.7 cm 25 0845   Wound Width (cm) 8.7 cm 25 0845   Wound Depth (cm) 1.1 cm 25 0845   Wound Surface Area (cm^2)

## 2025-07-23 ENCOUNTER — APPOINTMENT (OUTPATIENT)
Dept: GENERAL RADIOLOGY | Age: 70
DRG: 872 | End: 2025-07-23
Payer: COMMERCIAL

## 2025-07-23 LAB
ANION GAP SERPL CALCULATED.3IONS-SCNC: 10 MMOL/L (ref 9–16)
BASOPHILS # BLD: 0 K/UL (ref 0–0.2)
BASOPHILS NFR BLD: 0 % (ref 0–2)
BUN SERPL-MCNC: 14 MG/DL (ref 8–23)
CALCIUM SERPL-MCNC: 9.1 MG/DL (ref 8.6–10.4)
CHLORIDE SERPL-SCNC: 103 MMOL/L (ref 98–107)
CO2 SERPL-SCNC: 27 MMOL/L (ref 20–31)
CREAT SERPL-MCNC: 0.6 MG/DL (ref 0.6–0.9)
EOSINOPHIL # BLD: 0.2 K/UL (ref 0–0.4)
EOSINOPHILS RELATIVE PERCENT: 2 % (ref 1–4)
ERYTHROCYTE [DISTWIDTH] IN BLOOD BY AUTOMATED COUNT: 18.2 % (ref 12.5–15.4)
GFR, ESTIMATED: >90 ML/MIN/1.73M2
GLUCOSE BLD-MCNC: 108 MG/DL (ref 65–105)
GLUCOSE BLD-MCNC: 126 MG/DL (ref 65–105)
GLUCOSE BLD-MCNC: 158 MG/DL (ref 65–105)
GLUCOSE BLD-MCNC: 163 MG/DL (ref 65–105)
GLUCOSE SERPL-MCNC: 127 MG/DL (ref 74–99)
HCT VFR BLD AUTO: 36.6 % (ref 36–46)
HGB BLD-MCNC: 11.8 G/DL (ref 12–16)
LYMPHOCYTES NFR BLD: 2.4 K/UL (ref 1–4.8)
LYMPHOCYTES RELATIVE PERCENT: 24 % (ref 24–44)
MCH RBC QN AUTO: 28.6 PG (ref 26–34)
MCHC RBC AUTO-ENTMCNC: 32.1 G/DL (ref 31–37)
MCV RBC AUTO: 89.1 FL (ref 80–100)
MONOCYTES NFR BLD: 0.7 K/UL (ref 0.1–1.2)
MONOCYTES NFR BLD: 7 % (ref 2–11)
NEUTROPHILS NFR BLD: 67 % (ref 36–66)
NEUTS SEG NFR BLD: 6.6 K/UL (ref 1.8–7.7)
PLATELET # BLD AUTO: 300 K/UL (ref 140–450)
PMV BLD AUTO: 8.7 FL (ref 6–12)
POTASSIUM SERPL-SCNC: 4.1 MMOL/L (ref 3.7–5.3)
RBC # BLD AUTO: 4.1 M/UL (ref 4–5.2)
SODIUM SERPL-SCNC: 140 MMOL/L (ref 136–145)
WBC OTHER # BLD: 9.9 K/UL (ref 3.5–11)

## 2025-07-23 PROCEDURE — 80048 BASIC METABOLIC PNL TOTAL CA: CPT

## 2025-07-23 PROCEDURE — 82947 ASSAY GLUCOSE BLOOD QUANT: CPT

## 2025-07-23 PROCEDURE — 6370000000 HC RX 637 (ALT 250 FOR IP): Performed by: STUDENT IN AN ORGANIZED HEALTH CARE EDUCATION/TRAINING PROGRAM

## 2025-07-23 PROCEDURE — 97535 SELF CARE MNGMENT TRAINING: CPT

## 2025-07-23 PROCEDURE — 6370000000 HC RX 637 (ALT 250 FOR IP): Performed by: SURGERY

## 2025-07-23 PROCEDURE — 36415 COLL VENOUS BLD VENIPUNCTURE: CPT

## 2025-07-23 PROCEDURE — 6370000000 HC RX 637 (ALT 250 FOR IP): Performed by: INTERNAL MEDICINE

## 2025-07-23 PROCEDURE — 99232 SBSQ HOSP IP/OBS MODERATE 35: CPT | Performed by: STUDENT IN AN ORGANIZED HEALTH CARE EDUCATION/TRAINING PROGRAM

## 2025-07-23 PROCEDURE — 97530 THERAPEUTIC ACTIVITIES: CPT

## 2025-07-23 PROCEDURE — 97110 THERAPEUTIC EXERCISES: CPT

## 2025-07-23 PROCEDURE — 2060000000 HC ICU INTERMEDIATE R&B

## 2025-07-23 PROCEDURE — 85025 COMPLETE CBC W/AUTO DIFF WBC: CPT

## 2025-07-23 PROCEDURE — 74018 RADEX ABDOMEN 1 VIEW: CPT

## 2025-07-23 PROCEDURE — 6370000000 HC RX 637 (ALT 250 FOR IP)

## 2025-07-23 RX ORDER — POLYETHYLENE GLYCOL 3350 17 G/17G
17 POWDER, FOR SOLUTION ORAL DAILY
Status: DISCONTINUED | OUTPATIENT
Start: 2025-07-23 | End: 2025-07-25 | Stop reason: HOSPADM

## 2025-07-23 RX ORDER — SENNA AND DOCUSATE SODIUM 50; 8.6 MG/1; MG/1
2 TABLET, FILM COATED ORAL DAILY
DISCHARGE
Start: 2025-07-24

## 2025-07-23 RX ADMIN — DULOXETINE 30 MG: 30 CAPSULE, DELAYED RELEASE ORAL at 08:19

## 2025-07-23 RX ADMIN — AMLODIPINE BESYLATE 10 MG: 10 TABLET ORAL at 08:19

## 2025-07-23 RX ADMIN — POLYETHYLENE GLYCOL 3350 17 G: 17 POWDER, FOR SOLUTION ORAL at 16:57

## 2025-07-23 RX ADMIN — METOPROLOL TARTRATE 25 MG: 25 TABLET, FILM COATED ORAL at 08:19

## 2025-07-23 RX ADMIN — EMPAGLIFLOZIN 10 MG: 10 TABLET, FILM COATED ORAL at 08:19

## 2025-07-23 RX ADMIN — PANTOPRAZOLE SODIUM 40 MG: 40 TABLET, DELAYED RELEASE ORAL at 06:04

## 2025-07-23 RX ADMIN — Medication 400 MG: at 08:19

## 2025-07-23 RX ADMIN — SENNOSIDES AND DOCUSATE SODIUM 2 TABLET: 50; 8.6 TABLET ORAL at 08:18

## 2025-07-23 RX ADMIN — RIVAROXABAN 20 MG: 10 TABLET, FILM COATED ORAL at 08:19

## 2025-07-23 RX ADMIN — THERA TABS 1 TABLET: TAB at 08:19

## 2025-07-23 RX ADMIN — ACETAMINOPHEN 650 MG: 325 TABLET ORAL at 08:18

## 2025-07-23 RX ADMIN — METOPROLOL TARTRATE 25 MG: 25 TABLET, FILM COATED ORAL at 20:28

## 2025-07-23 RX ADMIN — OXYBUTYNIN CHLORIDE 10 MG: 5 TABLET, EXTENDED RELEASE ORAL at 20:28

## 2025-07-23 RX ADMIN — PYRIDOSTIGMINE BROMIDE 60 MG: 60 TABLET ORAL at 08:19

## 2025-07-23 RX ADMIN — ATORVASTATIN CALCIUM 20 MG: 20 TABLET, FILM COATED ORAL at 20:28

## 2025-07-23 ASSESSMENT — PAIN DESCRIPTION - LOCATION: LOCATION: BUTTOCKS

## 2025-07-23 ASSESSMENT — PAIN SCALES - GENERAL
PAINLEVEL_OUTOF10: 7
PAINLEVEL_OUTOF10: 0
PAINLEVEL_OUTOF10: 3
PAINLEVEL_OUTOF10: 3

## 2025-07-23 ASSESSMENT — PAIN DESCRIPTION - ORIENTATION: ORIENTATION: RIGHT

## 2025-07-23 ASSESSMENT — PAIN DESCRIPTION - DESCRIPTORS: DESCRIPTORS: SHARP

## 2025-07-23 ASSESSMENT — PAIN - FUNCTIONAL ASSESSMENT: PAIN_FUNCTIONAL_ASSESSMENT: PREVENTS OR INTERFERES SOME ACTIVE ACTIVITIES AND ADLS

## 2025-07-23 NOTE — PROGRESS NOTES
This writer again attempted to give glycolax, patient still refused, said it doesn't work.  This writer then offered patient the metamucil, patient still refused stating she doesn't want to have diarrhea with the wounds. This writer again educated patient  on indications for medications, patient verbalized understanding, still refused.   Dr. Quinteros notified via secured message of the above

## 2025-07-23 NOTE — PROGRESS NOTES
[facility]  Office: [phone]  Sage Waters DO, Donato Valencia, DO, Roberto Carlos Amaral DO, Jomar Valentine, DO, Chery Gandhi MD, Bertha Ryan MD, Eric Pruitt MD, Marcia Burton MD,  Hunter Toscano MD, Kandice Scales MD, Bella Harvey MD,  Balwinder Chung DO, Halley Anthony MD, Parag Plascencia MD, Yan Waters DO, Zaida Tse MD,  Isrrael Yin DO, Tosin Whaley MD, Becky Torrez MD, Laura Olguin MD,  Tam Ordonez MD, Eduardo Stock MD, Neli Laughlin MD, Salazar Dutta MD, Tavon Dominguez MD, Dodie Breen MD, Jenaro Snell, DO, Lilia Brown MD, Mook Miguel DO, Rosas Serrano MD, Balwinder Lanier MD, Mohsin Reza, MD, Mary Edward MD, Shirley Waterhouse, CNP,  Argentina Philippe, CNP, Jenaro Mccabe, CNP,  Peace De La Fuente, DNP, Lakshmi Vee, CNP, Ysabel Watson, CNP, Mira Barillas, CNP, Carmencita Driver, CNP, Gabby Ireland, PA-C, Azra German, CNP, Man Ball, CNP,  Kylee Lanier, CNP, Myrna Tellez, CNP, Bruce Justin, PA-C, Susie Mobley, PA-C, Krystyna Samuels, CNP,        Autumn Ang, CNS, Giselle Encarnacion, CNP, Azalia Chappell, CNP         [facility]   IN-PATIENT SERVICE   [facility]    Progress Note    7/23/2025    2:58 PM    Name:   Claire Rubin  MRN:     [id number]     Acct:      [id number]   Room:   [facility] Day:  10  Admit Date:  7/12/2025 10:01 PM    PCP:   Bjorn Yost MD  Code Status:  Full Code    Subjective:     C/C:   Chief Complaint   Patient presents with    Fatigue     Reports generalized weakness     Interval History Status: not changed.   Seen at bedside, hemodynamically stable  Did not have a bowel movement, KUB concerning for moderate stool burden with ileus  Labs reviewed      Brief History:     Per the medical record:  \"70 y.o. female is admitted for management of sepsis secondary to sacral wound. She states she has had increased fatigue, worsening pain in her sacral wound, and generalized weakness and  has never used smokeless tobacco. She reports that she does not drink alcohol and does not use drugs.     Family History: History reviewed. No pertinent family history.    Vitals:  /74   Pulse 71   Temp 98.2 °F (36.8 °C) (Oral)   Resp 21   Ht 1.6 m (5' 3\")   Wt 100 kg (220 lb 7.4 oz)   SpO2 94%   BMI 39.05 kg/m²   Temp (24hrs), Av.5 °F (36.9 °C), Min:97.9 °F (36.6 °C), Max:99 °F (37.2 °C)    Recent Labs     25  1549 25  0639 25  1237   POCGLU 147* 199* 108* 126*       I/O (24Hr):    Intake/Output Summary (Last 24 hours) at 2025 1458  Last data filed at 2025 0754  Gross per 24 hour   Intake 120 ml   Output 450 ml   Net -330 ml       Labs:  Hematology:  Recent Labs     25  1440 25  0924 25  0445   WBC 9.3 9.0 9.9   RBC 4.02 4.21 4.10   HGB 11.6* 12.1 11.8*   HCT 36.0 37.1 36.6   MCV 89.6 88.2 89.1   MCH 28.9 28.9 28.6   MCHC 32.3 32.7 32.1   RDW 18.4* 18.3* 18.2*    310 300   MPV 8.9 8.8 8.7       Chemistry:  Recent Labs     25  1440 25  0924 25  0445    141 140   K 4.1 4.2 4.1    103 103   CO2 26 27 27   GLUCOSE 102* 147* 127*   BUN 14 12 14   CREATININE 0.6 0.5* 0.6   MG  --  1.6  --    ANIONGAP 10 11 10   LABGLOM >90 >90 >90   CALCIUM 8.7 9.1 9.1   TROPHS  --  34*  --        Recent Labs     25  0606 25  1059 25  1549 25  0639 25  1237   POCGLU 127* 133* 147* 199* 108* 126*     ABG:No results found for: \"POCPH\", \"PHART\", \"PH\", \"POCPCO2\", \"KMS4HAG\", \"PCO2\", \"POCPO2\", \"PO2ART\", \"PO2\", \"POCHCO3\", \"LUD1FKL\", \"HCO3\", \"NBEA\", \"PBEA\", \"BEART\", \"BE\", \"THGBART\", \"THB\", \"KUC6OHS\", \"ZNVX9USQ\", \"I0IYTPLK\", \"O2SAT\", \"FIO2\"  Lab Results   Component Value Date/Time    SPECIAL LEFT ARM 12ML 2025 11:24 PM     Lab Results   Component Value Date/Time    CULTURE PROTEUS MIRABILIS 10 to 50,000 CFU/ML (A) 2025 04:37 AM       Radiology:  XR CHEST PORTABLE  Result Date:

## 2025-07-23 NOTE — PROGRESS NOTES
This writer notified Dr. Neli Laughlin via secured message that patient is refusing soaps walter enema, glycolax, and metamucil. This writer has explained indications for medications being order. Patient verbalized understanding and stated, \"I took senekot this morning, those will work.\"

## 2025-07-23 NOTE — PLAN OF CARE
Problem: Gastrointestinal - Adult  Goal: Maintains or returns to baseline bowel function  7/23/2025 1722 by Tran Handley RN  Outcome: Not Progressing-patient c/o no BM in a week

## 2025-07-23 NOTE — CARE COORDINATION
[facility] Quality Flow/Interdisciplinary Rounds Progress Note    Quality Flow Rounds held on July 23, 2025 at 0930    Disciplines Attending:  Bedside Nurse, , and Nursing Unit Leadership    Barriers to Discharge: clinical status     Anticipated Discharge Date:   7/23/25     [facility] RISK OF UNPLANNED READMISSION 2.0             14.6 Total Score        Discussed patient goal for the day, patient clinical progression, and barriers to discharge.  Per Dr Gamino patient will need a [facility] cushion for chair and wound care follow up post discharge. Dr Gamino states he is happy to see patient in his office in [place] if wound care nurse is not available at facility but facility can use any wound care center for follow up. Waiting for peer to peer to be completed.       Otilio Strong  July 23, 2025

## 2025-07-23 NOTE — PLAN OF CARE
Problem: Safety - Adult  Goal: Free from fall injury  7/23/2025 0513 by Katie Mathew RN  Outcome: Progressing     Problem: Chronic Conditions and Co-morbidities  Goal: Patient's chronic conditions and co-morbidity symptoms are monitored and maintained or improved  7/23/2025 0513 by Katie Mathew RN  Outcome: Progressing     Problem: Discharge Planning  Goal: Discharge to home or other facility with appropriate resources  7/23/2025 0513 by Katie Mathew RN  Outcome: Progressing     Problem: Skin/Tissue Integrity  Goal: Skin integrity remains intact  Description: 1.  Monitor for areas of redness and/or skin breakdown  2.  Assess vascular access sites hourly  3.  Every 4-6 hours minimum:  Change oxygen saturation probe site  4.  Every 4-6 hours:  If on nasal continuous positive airway pressure, respiratory therapy assess nares and determine need for appliance change or resting period  7/23/2025 0513 by Katie Mathew RN  Outcome: Progressing     Problem: ABCDS Injury Assessment  Goal: Absence of physical injury  7/23/2025 0513 by Katie Mathew RN  Outcome: Progressing     Problem: Pain  Goal: Verbalizes/displays adequate comfort level or baseline comfort level  7/23/2025 0513 by Katie Mathew RN  Outcome: Progressing     Problem: Nutrition Deficit:  Goal: Optimize nutritional status  7/23/2025 0513 by Katie Mathew RN  Outcome: Progressing     Problem: Neurosensory - Adult  Goal: Achieves maximal functionality and self care  7/23/2025 0513 by Katie Mathew RN  Outcome: Progressing     Problem: Cardiovascular - Adult  Goal: Absence of cardiac dysrhythmias or at baseline  7/23/2025 0513 by Katie Mathew RN  Outcome: Progressing     Problem: Skin/Tissue Integrity - Adult  Goal: Skin integrity remains intact  Description: 1.  Monitor for areas of redness and/or skin breakdown  2.  Assess vascular access sites hourly  3.  Every 4-6 hours minimum:  Change oxygen saturation probe site  4.  Every 4-6 hours:  If on  RN  Outcome: Progressing

## 2025-07-23 NOTE — PROGRESS NOTES
Dr. Laughlin has come and talked with patient about reasons for ordering metamucil, glycolax, and soap suds enema.  Dr. Laughlin has explained indications for these medications.  Patient agreeable to try to glycolax.

## 2025-07-23 NOTE — PROGRESS NOTES
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  Past Surgical History:  has a past surgical history that includes Hysterectomy, total abdominal (); Cholecystectomy, open (); Appendectomy (); Femur fracture surgery (Left, 2018); and Ovary removal.    Discharge Recommendations  Discharge Recommendations: Therapy recommended at discharge  PT Equipment Recommendations  Equipment Needed: No    Assessment  Body Structures, Functions, Activity Limitations Requiring Skilled Therapeutic Intervention: Decreased functional mobility ;Decreased ADL status;Decreased ROM;Decreased strength;Decreased balance;Decreased posture  Assessment: Pt presents with fatigue, weakness, sepsis due to sacral wound. Pt recently at [facility] at [facility] since  and just released to home few days ago. At baseline, pt requires assist with mechanical stand lift into electric w/c, pt able to maneuver electric w/c around house for ind light meals, dishes and some laundry, requires assist for toileting, she performs assist with UE and trunk ADL's (family dependent with lower body). Currently, the patient requires Max assistance for supine <--> sit, is able to sit EOB x 15 minutes with CGA-Woodrow and 1UE for support at most times while completing exercises or dynamic tasks, and MaxA for rolling to reposition. Pt will require 24 hour assist at discharge. Pt will benefit from continued skilled PT for LE stretching, trunk strengthening and functional mobility training while in the

## 2025-07-23 NOTE — CARE COORDINATION
1115- Peer to peer completed, approval received for patient to admit to SNF today. Auth approval sent to [facility] in [facility], waiting to hear back if patient can admit there today.     1400- RN notified CM that patient will not be discharging today. Update sent to [facility] in [facility].

## 2025-07-23 NOTE — PLAN OF CARE
Claire Rubin requires a [facility] cushion for her chair due to sacral decubitus ulcers, quadriplegia from transverse myelitis

## 2025-07-23 NOTE — PROGRESS NOTES
Subjective    70-year-old here with sacral decubitus.  This ulcer is clean and currently has a wound VAC on it.  I do feel that she should have a [employer] cushion at home.  I am okay with her being discharged to rehab.  If she needs to follow-up for wound care would be happy to see her at my office in [place].    Objective    Alert and oriented and tolerating breakfast  Chest symmetric excursion  Abdomen soft and nontender  Extremities can really only move the right upper extremity due to her transverse myelitis    Assessment and plan    70-year-old that is near quadriplegic from transverse myelitis.  Has a sacral wound that is stage II.  Okay to rehab.  Should have a [employer] cushion for her chair.  We would be happy to see her if we can help with wound care in our [place] office.

## 2025-07-23 NOTE — PLAN OF CARE
Problem: Safety - Adult  Goal: Free from fall injury  7/23/2025 1721 by Tran Handley RN  Outcome: Progressing     Problem: Chronic Conditions and Co-morbidities  Goal: Patient's chronic conditions and co-morbidity symptoms are monitored and maintained or improved  7/23/2025 1721 by Tran Handley RN  Outcome: Progressing  Flowsheets (Taken 7/23/2025 0808)  Care Plan - Patient's Chronic Conditions and Co-Morbidity Symptoms are Monitored and Maintained or Improved:   Monitor and assess patient's chronic conditions and comorbid symptoms for stability, deterioration, or improvement   Collaborate with multidisciplinary team to address chronic and comorbid conditions and prevent exacerbation or deterioration   Update acute care plan with appropriate goals if chronic or comorbid symptoms are exacerbated and prevent overall improvement and discharge     Problem: Discharge Planning  Goal: Discharge to home or other facility with appropriate resources  7/23/2025 1721 by Tran Handley RN  Outcome: Progressing  Flowsheets (Taken 7/23/2025 0808)  Discharge to home or other facility with appropriate resources:   Identify barriers to discharge with patient and caregiver   Arrange for needed discharge resources and transportation as appropriate   Identify discharge learning needs (meds, wound care, etc)   Refer to discharge planning if patient needs post-hospital services based on physician order or complex needs related to functional status, cognitive ability or social support system     Problem: Skin/Tissue Integrity  Goal: Skin integrity remains intact  Description: 1.  Monitor for areas of redness and/or skin breakdown  2.  Assess vascular access sites hourly  3.  Every 4-6 hours minimum:  Change oxygen saturation probe site  4.  Every 4-6 hours:  If on nasal continuous positive airway pressure, respiratory therapy assess nares and determine need for appliance change or resting period  7/23/2025 1721 by Tran Handley RN  Outcome:

## 2025-07-23 NOTE — PROGRESS NOTES
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  Past Surgical History:  has a past surgical history that includes Hysterectomy, total abdominal (); Cholecystectomy, open (); Appendectomy (); Femur fracture surgery (Left, 2018); and Ovary removal.    Discharge Recommendations  Discharge Recommendations: Patient would benefit from continued therapy after discharge  OT Equipment Recommendations  Other: resting hand splint for R UE night time wear    Assessment  Performance deficits / Impairments: Decreased functional mobility ;Decreased ADL status;Decreased balance  Assessment: Pt presents to OT this date with above noted deficits. Pt is not currently functioning at [facility] but is making slow progress towards OT goals as now requires Max Ax2 for bed mobility and sat EOB CGA-MIN A 15 mins. At this time pt does not demonstrate the functional ability to safely return to prior living arrangements. It is recommended that pt recieve OT services during hospitalization and after discharge to increase safety/ADLs for safe return to previous environment.  Prognosis: Good;Fair  REQUIRES OT FOLLOW-UP: Yes  Activity Tolerance  Activity Tolerance: Patient tolerated treatment well  Activity Tolerance Comments: Pt incontinent and brief change prior to sit EOB.  Safety Devices  Type of Devices: Call light within reach, Gait belt, Patient at risk for falls, Nurse notified, Bed alarm in place, Left in bed    AM-PAC  AM-PAC Daily Activity - Inpatient   How much help is needed for

## 2025-07-24 VITALS
HEIGHT: 63 IN | SYSTOLIC BLOOD PRESSURE: 99 MMHG | HEART RATE: 85 BPM | RESPIRATION RATE: 18 BRPM | DIASTOLIC BLOOD PRESSURE: 62 MMHG | BODY MASS INDEX: 39.06 KG/M2 | TEMPERATURE: 97.9 F | OXYGEN SATURATION: 92 % | WEIGHT: 220.46 LBS

## 2025-07-24 LAB
ANION GAP SERPL CALCULATED.3IONS-SCNC: 11 MMOL/L (ref 9–16)
BASOPHILS # BLD: 0 K/UL (ref 0–0.2)
BASOPHILS NFR BLD: 0 % (ref 0–2)
BUN SERPL-MCNC: 12 MG/DL (ref 8–23)
CALCIUM SERPL-MCNC: 9.3 MG/DL (ref 8.6–10.4)
CHLORIDE SERPL-SCNC: 101 MMOL/L (ref 98–107)
CO2 SERPL-SCNC: 23 MMOL/L (ref 20–31)
CREAT SERPL-MCNC: 0.5 MG/DL (ref 0.7–1.2)
EOSINOPHIL # BLD: 0 K/UL (ref 0–0.4)
EOSINOPHILS RELATIVE PERCENT: 0 % (ref 1–4)
ERYTHROCYTE [DISTWIDTH] IN BLOOD BY AUTOMATED COUNT: 17.6 % (ref 12.5–15.4)
GFR, ESTIMATED: >90 ML/MIN/1.73M2
GLUCOSE BLD-MCNC: 119 MG/DL (ref 65–105)
GLUCOSE BLD-MCNC: 122 MG/DL (ref 65–105)
GLUCOSE BLD-MCNC: 133 MG/DL (ref 65–105)
GLUCOSE BLD-MCNC: 143 MG/DL (ref 65–105)
GLUCOSE SERPL-MCNC: 120 MG/DL (ref 74–99)
HCT VFR BLD AUTO: 37.6 % (ref 36–46)
HGB BLD-MCNC: 12.3 G/DL (ref 12–16)
LYMPHOCYTES NFR BLD: 1.76 K/UL (ref 1–4.8)
LYMPHOCYTES RELATIVE PERCENT: 14 % (ref 24–44)
MCH RBC QN AUTO: 29 PG (ref 26–34)
MCHC RBC AUTO-ENTMCNC: 32.7 G/DL (ref 31–37)
MCV RBC AUTO: 88.8 FL (ref 80–100)
MONOCYTES NFR BLD: 0.88 K/UL (ref 0.1–0.8)
MONOCYTES NFR BLD: 7 % (ref 1–7)
MORPHOLOGY: NORMAL
NEUTROPHILS NFR BLD: 79 % (ref 36–66)
NEUTS SEG NFR BLD: 9.96 K/UL (ref 1.8–7.7)
PLATELET # BLD AUTO: 293 K/UL (ref 140–450)
PMV BLD AUTO: 9 FL (ref 6–12)
POTASSIUM SERPL-SCNC: 4.2 MMOL/L (ref 3.7–5.3)
RBC # BLD AUTO: 4.23 M/UL (ref 4–5.2)
SODIUM SERPL-SCNC: 135 MMOL/L (ref 136–145)
WBC OTHER # BLD: 12.6 K/UL (ref 3.5–11)

## 2025-07-24 PROCEDURE — 6370000000 HC RX 637 (ALT 250 FOR IP): Performed by: SURGERY

## 2025-07-24 PROCEDURE — 6370000000 HC RX 637 (ALT 250 FOR IP): Performed by: INTERNAL MEDICINE

## 2025-07-24 PROCEDURE — 99211 OFF/OP EST MAY X REQ PHY/QHP: CPT

## 2025-07-24 PROCEDURE — 80048 BASIC METABOLIC PNL TOTAL CA: CPT

## 2025-07-24 PROCEDURE — 99239 HOSP IP/OBS DSCHRG MGMT >30: CPT | Performed by: STUDENT IN AN ORGANIZED HEALTH CARE EDUCATION/TRAINING PROGRAM

## 2025-07-24 PROCEDURE — 6370000000 HC RX 637 (ALT 250 FOR IP)

## 2025-07-24 PROCEDURE — 85025 COMPLETE CBC W/AUTO DIFF WBC: CPT

## 2025-07-24 PROCEDURE — 97606 NEG PRS WND THER DME>50 SQCM: CPT

## 2025-07-24 PROCEDURE — 82947 ASSAY GLUCOSE BLOOD QUANT: CPT

## 2025-07-24 PROCEDURE — 36415 COLL VENOUS BLD VENIPUNCTURE: CPT

## 2025-07-24 RX ORDER — BISACODYL 10 MG
10 SUPPOSITORY, RECTAL RECTAL DAILY
Status: DISCONTINUED | OUTPATIENT
Start: 2025-07-24 | End: 2025-07-25 | Stop reason: HOSPADM

## 2025-07-24 RX ORDER — POLYETHYLENE GLYCOL 3350 17 G/17G
17 POWDER, FOR SOLUTION ORAL DAILY
DISCHARGE
Start: 2025-07-25 | End: 2025-08-24

## 2025-07-24 RX ADMIN — DULOXETINE 30 MG: 30 CAPSULE, DELAYED RELEASE ORAL at 10:43

## 2025-07-24 RX ADMIN — RIVAROXABAN 20 MG: 10 TABLET, FILM COATED ORAL at 08:30

## 2025-07-24 RX ADMIN — EMPAGLIFLOZIN 10 MG: 10 TABLET, FILM COATED ORAL at 10:43

## 2025-07-24 RX ADMIN — PANTOPRAZOLE SODIUM 40 MG: 40 TABLET, DELAYED RELEASE ORAL at 05:46

## 2025-07-24 RX ADMIN — THERA TABS 1 TABLET: TAB at 10:43

## 2025-07-24 RX ADMIN — Medication 400 MG: at 10:42

## 2025-07-24 RX ADMIN — METOPROLOL TARTRATE 25 MG: 25 TABLET, FILM COATED ORAL at 10:42

## 2025-07-24 RX ADMIN — AMLODIPINE BESYLATE 10 MG: 10 TABLET ORAL at 10:42

## 2025-07-24 RX ADMIN — PYRIDOSTIGMINE BROMIDE 60 MG: 60 TABLET ORAL at 10:43

## 2025-07-24 ASSESSMENT — PAIN SCALES - GENERAL
PAINLEVEL_OUTOF10: 0
PAINLEVEL_OUTOF10: 0

## 2025-07-24 NOTE — PROGRESS NOTES
Writer spoke with Dr. Spencer per Dr. Laughlin request to inquiry about surgery plan of care. Dr. Spencer updated to RN no obstruction,pt okay to be discharged per General surgery point. Writer updated Dr. Laughlin of this verbally bedside.

## 2025-07-24 NOTE — PLAN OF CARE
Problem: Safety - Adult  Goal: Free from fall injury  7/24/2025 1927 by Peace Izquierdo RN  Outcome: Progressing  7/24/2025 0533 by Kim Rizzo RN  Outcome: Progressing     Problem: Chronic Conditions and Co-morbidities  Goal: Patient's chronic conditions and co-morbidity symptoms are monitored and maintained or improved  7/24/2025 1927 by Peace Izquierdo RN  Outcome: Progressing  7/24/2025 0533 by Kim Rizzo RN  Outcome: Progressing  Flowsheets (Taken 7/23/2025 2000)  Care Plan - Patient's Chronic Conditions and Co-Morbidity Symptoms are Monitored and Maintained or Improved:   Monitor and assess patient's chronic conditions and comorbid symptoms for stability, deterioration, or improvement   Collaborate with multidisciplinary team to address chronic and comorbid conditions and prevent exacerbation or deterioration   Update acute care plan with appropriate goals if chronic or comorbid symptoms are exacerbated and prevent overall improvement and discharge     Problem: Discharge Planning  Goal: Discharge to home or other facility with appropriate resources  7/24/2025 1927 by Peace Izquierdo RN  Outcome: Progressing  7/24/2025 0533 by Kim Rizzo RN  Outcome: Progressing  Flowsheets (Taken 7/23/2025 2000)  Discharge to home or other facility with appropriate resources:   Identify barriers to discharge with patient and caregiver   Arrange for needed discharge resources and transportation as appropriate   Identify discharge learning needs (meds, wound care, etc)     Problem: Skin/Tissue Integrity  Goal: Skin integrity remains intact  Description: 1.  Monitor for areas of redness and/or skin breakdown  2.  Assess vascular access sites hourly  3.  Every 4-6 hours minimum:  Change oxygen saturation probe site  4.  Every 4-6 hours:  If on nasal continuous positive airway pressure, respiratory therapy assess nares and determine need for appliance change or resting period  7/24/2025 1927 by Peace Izquierdo

## 2025-07-24 NOTE — CARE COORDINATION
Returned phone call and spoke with patient's daughter to let her know that we are awaiting General Surgery recommendations to let us know if discharge to [facility] is possible today.    1235-Called [facility] and left the same information for them via a message.     1445-Called and left message at [facility] to see if they can take patient today also messaged them through [employer]. Transport Request sent.    1635-Received call from [employer] and Lang Isaac will  patient at 2000 tonight to transport patient to [facility]. Patient's nurse informed. Patient informed and she will tell her daughter.    1645-Call report to [phone].    1730-Transport packet completed with face sheet, AVS, med list and HENS, the same information was sent through [employer]. Packet given to patient's nurse.

## 2025-07-24 NOTE — PLAN OF CARE
Problem: Safety - Adult  Goal: Free from fall injury  7/24/2025 0533 by Kim Rizzo RN  Outcome: Progressing     Problem: Chronic Conditions and Co-morbidities  Goal: Patient's chronic conditions and co-morbidity symptoms are monitored and maintained or improved  7/24/2025 0533 by Kim Rizzo RN  Outcome: Progressing  Flowsheets (Taken 7/23/2025 2000)  Care Plan - Patient's Chronic Conditions and Co-Morbidity Symptoms are Monitored and Maintained or Improved:   Monitor and assess patient's chronic conditions and comorbid symptoms for stability, deterioration, or improvement   Collaborate with multidisciplinary team to address chronic and comorbid conditions and prevent exacerbation or deterioration   Update acute care plan with appropriate goals if chronic or comorbid symptoms are exacerbated and prevent overall improvement and discharge     Problem: Discharge Planning  Goal: Discharge to home or other facility with appropriate resources  7/24/2025 0533 by Kim Rizzo RN  Outcome: Progressing  Flowsheets (Taken 7/23/2025 2000)  Discharge to home or other facility with appropriate resources:   Identify barriers to discharge with patient and caregiver   Arrange for needed discharge resources and transportation as appropriate   Identify discharge learning needs (meds, wound care, etc)     Problem: Skin/Tissue Integrity  Goal: Skin integrity remains intact  Description: 1.  Monitor for areas of redness and/or skin breakdown  2.  Assess vascular access sites hourly  3.  Every 4-6 hours minimum:  Change oxygen saturation probe site  4.  Every 4-6 hours:  If on nasal continuous positive airway pressure, respiratory therapy assess nares and determine need for appliance change or resting period  7/24/2025 0533 by Kim Rizzo, RN  Outcome: Progressing  Flowsheets (Taken 7/23/2025 2000)  Skin Integrity Remains Intact: Monitor for areas of redness and/or skin breakdown     Problem: ABCDS Injury  Assessment  Goal: Absence of physical injury  7/24/2025 0533 by Kim Rizzo RN  Outcome: Progressing     Problem: Pain  Goal: Verbalizes/displays adequate comfort level or baseline comfort level  7/24/2025 0533 by Kim Rizzo RN  Outcome: Progressing     Problem: Nutrition Deficit:  Goal: Optimize nutritional status  7/24/2025 0533 by Kim Rizzo RN  Outcome: Progressing     Problem: Neurosensory - Adult  Goal: Achieves maximal functionality and self care  7/24/2025 0533 by Kim Rizzo RN  Outcome: Progressing  Flowsheets (Taken 7/23/2025 2000)  Achieves maximal functionality and self care:   Monitor swallowing and airway patency with patient fatigue and changes in neurological status   Encourage and assist patient to increase activity and self care with guidance from physical therapy/occupational therapy   Encourage visually impaired, hearing impaired and aphasic patients to use assistive/communication devices     Problem: Cardiovascular - Adult  Goal: Absence of cardiac dysrhythmias or at baseline  7/24/2025 0533 by Kim Rizzo RN  Outcome: Progressing  Flowsheets (Taken 7/23/2025 2000)  Absence of cardiac dysrhythmias or at baseline:   Monitor cardiac rate and rhythm   Assess for signs of decreased cardiac output   Administer antiarrhythmia medication and electrolyte replacement as ordered     Problem: Skin/Tissue Integrity - Adult  Goal: Skin integrity remains intact  Description: 1.  Monitor for areas of redness and/or skin breakdown  2.  Assess vascular access sites hourly  3.  Every 4-6 hours minimum:  Change oxygen saturation probe site  4.  Every 4-6 hours:  If on nasal continuous positive airway pressure, respiratory therapy assess nares and determine need for appliance change or resting period  7/24/2025 0533 by Kim Rizzo RN  Outcome: Progressing  Flowsheets (Taken 7/23/2025 2000)  Skin Integrity Remains Intact: Monitor for areas of redness and/or skin breakdown

## 2025-07-24 NOTE — PROGRESS NOTES
Dr. Laughlin bedside, pt is refusing all bowel regimen medications. Per Dr. Laughlin request writer sent perfect serve msg to Dr. Gamino / Gen Surg to inquiry their plan of care for this patient. Waiting response.

## 2025-07-24 NOTE — PROGRESS NOTES
Subjective    70-year-old here with transverse myelitis and a new sacral decubitus.  This wound is a stage II.  She has a wound VAC on.  She knows she needs to get a Roho cushion and to be turned every 2 hours.  She is going to a nursing home temporarily until this wound is a little better controlled and her plan is ultimately to go live back with family again.  She has done fairly well with that for 14 years without a decubitus up until now when she actually went to a nursing home for respite care.  Okay to discharge.  I would be happy to see her in follow-up in our private office for wound care if at is something that logistically is the best for her.    Objective    Alert and oriented  Chest symmetric excursion  Abdomen soft  Extremities can only move upper extremities right greater than left secondary to left rotator cuff problem  Sacral decubitus with wound VAC on and not examined today.    Assessment and plan    70-year-old here with transverse myelitis and a new sacral decubitus.  Okay to nursing home when arrangements can be made.  A wound VAC would allow her wound to heal in half the time.  Her goal is to get this wound healed and get back home with her family.  She has done amazingly well for the last 14 years without having a decubitus until she went into respite care and [facility]  I would be happy to see her in my private office for wound care if that would be simplest for her.  If they have wound care at her facility that would be perfectly acceptable as well.

## 2025-07-24 NOTE — PROGRESS NOTES
[facility]  Progress Note      NAME:  Claire Rubin  MEDICAL RECORD NUMBER:  [id number]  AGE: 70 y.o.   GENDER: female  : 1955  TODAY'S DATE:  2025    [facility] nursing to bedside for routine visit. NPWT dressing intact with continuous suction appreciated. A moderate amount of odor noted. This is a normal finding. Plan for dressing change  with WOC RN.     If suction fails or patient is discharged to facility:  - Remove vac dressing  - Cleanse wound with normal saline   - Pack wound with saline moistened gauze and change every 12 hours  - Facility vac to be placed upon admission      Measurements:  Negative Pressure Wound Therapy Coccyx (Active)   Dressing Status Intact w/seal 25 0830   Canister changed? No 25 0830   Output (ml) 0 ml 25 0754   Unit Type KCI Ulta 25 0830   Mode Continuous 25 0830   Target Pressure (mmHg) 125 25 0830   Intensity Low 25 0830   Dressing Change Due 25 1515   Number of days: 7       Wound 25 Coccyx (Active)   Wound Image   25 1515   Wound Etiology Pressure Unstageable 25 0830   Dressing Status Clean;Intact;Dry 25 0830   Wound Cleansed Not Cleansed 25 0400   Dressing/Treatment Negative pressure wound therapy 25 0830   Dressing Change Due 25 1515   Wound Assessment Other (Comment) 25 0830   Odor Moderate 25 0830   Number of days: 10     For concerns, the [facility] nursing team can be reached via [email or web address] by searching \"wound ostomy\" under groups and choosing the [facility] option Monday-Friday 8am-4pm.     MANOJ Mckinley, RN  [facility]  Wound, Ostomy, and Continence Nursing  [phone]    Electronically signed by Consuelo Ceballos RN on 2025 at 9:43 AM

## 2025-07-24 NOTE — PROGRESS NOTES
Comprehensive Nutrition Assessment    Type and Reason for Visit:  Reassess    Nutrition Recommendations/Plan:   Modify diet to CCHO (60 g/meal) with double protein.  Will d/c ONS per pt preference.   Monitor/encourage PO intake     Malnutrition Assessment:  Malnutrition Status:  No malnutrition (07/24/25 1323)    Context:  Acute Illness     Findings of the 6 clinical characteristics of malnutrition:  Energy Intake:  No decrease in energy intake  Weight Loss:  No weight loss     Body Fat Loss:  No body fat loss     Muscle Mass Loss:  No muscle mass loss    Fluid Accumulation:  No fluid accumulation     Strength:  Not Performed    Nutrition Assessment:    Pt reporta good appetite, eating % per chart. Pt states she is not using the Fausto or the ONS, she does not like them. Pt unwilling to try other flavors or other types of protien supplements. Pt agreeable to double protein at meals. Per pt NKFA, no chew/swallow issues. No BM noted, bowel regimine ordered, pt refusing to take. Trace BLE edema noted.    Nutrition Related Findings:    Labs/meds reviewed Wound Type: Pressure Injury, Stage II, Unstageable       Current Nutrition Intake & Therapies:    Average Meal Intake: 26-50%, 51-75%, %  Average Supplements Intake: Refusing to take  ADULT DIET; Regular; 4 carb choices (60 gm/meal)  ADULT ORAL NUTRITION SUPPLEMENT; Lunch, Dinner; Wound Healing Oral Supplement  ADULT ORAL NUTRITION SUPPLEMENT; AM Snack, PM Snack, HS Snack; Low Calorie/High Protein Oral Supplement    Anthropometric Measures:  Height: 160 cm (5' 2.99\")  Ideal Body Weight (IBW): 115 lbs (52 kg)       Current Body Weight: 100 kg (220 lb 7.4 oz), 191.7 % IBW.    Current BMI (kg/m2): 39.1  Usual Body Weight: 90.7 kg (199 lb 15.3 oz) (9/16/24)     % Weight Change (Calculated): 10.3  Weight Adjustment For: No Adjustment                 BMI Categories: Obese Class 2 (BMI 35.0 -39.9)    Estimated Daily Nutrient Needs:  Energy Requirements Based On:  Formula  Weight Used for Energy Requirements: Current  Energy (kcal/day): 1900 to 2000 kcal/day  Weight Used for Protein Requirements: Ideal  Protein (g/day): 65 to 85 g/day  Method Used for Fluid Requirements: 1 ml/kcal  Fluid (ml/day): 1900 to 2000 ml/day    Nutrition Diagnosis:   Increased nutrient needs related to increase demand for energy/nutrients as evidenced by wounds    Nutrition Interventions:   Food and/or Nutrient Delivery: Modify Current Diet, Discontinue Oral Nutrition Supplement  Nutrition Education/Counseling: No recommendation at this time  Coordination of Nutrition Care: Continue to monitor while inpatient       Goals:  Goals: PO intake 75% or greater  Type of Goal: Continue current goal  Previous Goal Met: Progressing toward Goal(s)    Nutrition Monitoring and Evaluation:   Behavioral-Environmental Outcomes: None Identified  Food/Nutrient Intake Outcomes: Food and Nutrient Intake  Physical Signs/Symptoms Outcomes: Biochemical Data, Constipation, Nutrition Focused Physical Findings, Skin, Weight    Discharge Planning:    Continue current diet     Latasha Hernandez MS, RD, LD  Contact: [phone]

## 2025-07-24 NOTE — DISCHARGE SUMMARY
[facility]  Office: [phone]  Sage Waters DO, Donato Valencia DO, Roberto Carlos Amaral DO, Jomar Valentine DO, Chery Gandhi MD, Bertha Ryan MD, Eric Pruitt MD, Marcia Burton MD,  Hunter Toscano MD, Kandice Scales MD, Bella Harvey MD,  Balwinder Chung DO, Yan Waters DO, Zaida Tse MD,  Isrrael Yin DO, Tosin Whaley MD, Becky Torrez MD, Laura Olguin MD,  Tam Ordonez MD, Eduardo Stock MD, Neli Laughlin MD, Salazar Dutta MD, Tavon Dominguez MD, Dodie Breen MD, Jenaro Snell DO, Lilia Brown MD, Mook Miguel DO, Parag Plascecnia MD, Rosas Serrano MD, Balwinder Lanier MD, Mohsin Reza, MD, Mary Edward MD, Shirley Waterhouse, CNP,  Argentina Philippe, CNP, Jenaro Mccabe, CNP,  Peace De La Fuente, DNP, Lakshmi Vee, CNP, Ysabel Watson, CNP, Mira Barillas, CNP, Carmencita Driver, CNP, Gabby Ireland, PA-C, Azra German, CNP, Man Ball, CNP,  Kylee Lanier, CNP, Myrna Tellez, CNP, Bruce Justin, PA-C, Susie Mobley, PA-C, Krystyna Samuels, CNP,        Autumn Ang, EDITH, Giselle Encarnacion, CNP, Azalia Chappell, CNP         [facility]   IN-PATIENT SERVICE   [facility]    Discharge Summary     Patient ID: Claire Rubin  :  1955   MRN: [id number]     ACCOUNT:  [id number]   Patient's PCP: Bjorn Yost MD  Admit Date: 2025   Discharge Date: 2025   Length of Stay: 11  Code Status:  Full Code  Admitting Physician: No admitting provider for patient encounter.  Discharge Physician: Neli Robles Sra, MD     Active Discharge Diagnoses:     Hospital Problem Lists:  Principal Problem:    Sepsis (HCC)  Active Problems:    Acute transverse myelitis in demyelinating disease of cnsl (HCC)    Controlled type 2 diabetes mellitus without complication, without long-term current use of insulin (HCC)    Essential hypertension    Lymphedema of both lower extremities    Neurogenic bladder    Paraplegia, complete (HCC)    Generalized weakness

## 2025-07-25 NOTE — PLAN OF CARE
Problem: Safety - Adult  Goal: Free from fall injury  7/24/2025 1959 by Peace Izquierdo RN  Outcome: Progressing  7/24/2025 1927 by Peace Izquierdo RN  Outcome: Progressing  Flowsheets (Taken 7/24/2025 0835)  Free From Fall Injury: Instruct family/caregiver on patient safety     Problem: Chronic Conditions and Co-morbidities  Goal: Patient's chronic conditions and co-morbidity symptoms are monitored and maintained or improved  7/24/2025 1959 by Peace Izquierdo RN  Outcome: Progressing  7/24/2025 1927 by Peace Izquierdo RN  Outcome: Progressing  Flowsheets (Taken 7/24/2025 0800)  Care Plan - Patient's Chronic Conditions and Co-Morbidity Symptoms are Monitored and Maintained or Improved:   Monitor and assess patient's chronic conditions and comorbid symptoms for stability, deterioration, or improvement   Collaborate with multidisciplinary team to address chronic and comorbid conditions and prevent exacerbation or deterioration   Update acute care plan with appropriate goals if chronic or comorbid symptoms are exacerbated and prevent overall improvement and discharge     Problem: Discharge Planning  Goal: Discharge to home or other facility with appropriate resources  7/24/2025 1959 by Peace Izquierdo RN  Outcome: Progressing  7/24/2025 1927 by Peace Izquierdo RN  Outcome: Progressing  Flowsheets (Taken 7/24/2025 0800)  Discharge to home or other facility with appropriate resources:   Identify barriers to discharge with patient and caregiver   Arrange for needed discharge resources and transportation as appropriate   Identify discharge learning needs (meds, wound care, etc)     Problem: Skin/Tissue Integrity  Goal: Skin integrity remains intact  Description: 1.  Monitor for areas of redness and/or skin breakdown  2.  Assess vascular access sites hourly  3.  Every 4-6 hours minimum:  Change oxygen saturation probe site  4.  Every 4-6 hours:  If on nasal continuous positive airway pressure, respiratory therapy assess  ARABELLA Hand  Outcome: Progressing     Problem: Skin/Tissue Integrity - Adult  Goal: Skin integrity remains intact  Description: 1.  Monitor for areas of redness and/or skin breakdown  2.  Assess vascular access sites hourly  3.  Every 4-6 hours minimum:  Change oxygen saturation probe site  4.  Every 4-6 hours:  If on nasal continuous positive airway pressure, respiratory therapy assess nares and determine need for appliance change or resting period  7/24/2025 1959 by Peace Izquierdo RN  Outcome: Progressing  7/24/2025 1927 by Peace Izquierdo RN  Outcome: Progressing  Flowsheets  Taken 7/24/2025 0835  Skin Integrity Remains Intact:   Monitor for areas of redness and/or skin breakdown   Assess vascular access sites hourly  Taken 7/24/2025 0800  Skin Integrity Remains Intact:   Monitor for areas of redness and/or skin breakdown   Assess vascular access sites hourly  Goal: Incisions, wounds, or drain sites healing without S/S of infection  7/24/2025 1959 by Pecae Izquierdo RN  Outcome: Progressing  7/24/2025 1927 by Peace Izquierdo RN  Outcome: Progressing  Flowsheets (Taken 7/24/2025 0835)  Incisions, Wounds, or Drain Sites Healing Without Sign and Symptoms of Infection: TWICE DAILY: Assess and document dressing/incision, wound bed, drain sites and surrounding tissue     Problem: Musculoskeletal - Adult  Goal: Return mobility to safest level of function  7/24/2025 1959 by Peace Izquierdo RN  Outcome: Progressing  7/24/2025 1927 by Peace Izquierdo RN  Outcome: Progressing  Goal: Maintain proper alignment of affected body part  7/24/2025 1959 by Peace Izquierdo RN  Outcome: Progressing  7/24/2025 1927 by Peace Izquierdo RN  Outcome: Progressing  Goal: Return ADL status to a safe level of function  7/24/2025 1959 by Peace Izquierdo RN  Outcome: Progressing  7/24/2025 1927 by Peace Izquierdo RN  Outcome: Progressing     Problem: Gastrointestinal - Adult  Goal: Maintains or returns to baseline bowel function  7/24/2025 1959

## 2025-07-25 NOTE — PROGRESS NOTES
Report called to BG Mariaelena BELL Dixie, updated wound vac removed wet to dry dressing applied. All questions answered Transportation time 20:00.

## 2025-08-01 NOTE — PROGRESS NOTES
Physician Progress Note      PATIENT:               CLAIRE RUBIN  CSN #:                  [id number]  :                       1955  ADMIT DATE:       2025 10:01 PM  DISCH DATE:        2025 8:45 PM  RESPONDING  PROVIDER #:        David Rowan MD          QUERY TEXT:    Noted documentation of quadriplegia on  per general surgery and paraplegia   per discharge summary per IM on .  Based on your medical judgment, please   clarify these findings and document any of the following are being evaluated   and/or treated.    The clinical indicators include:   Gen surgery note-  Patient is near quadriplegic from transverse myelitis  Physicians order on  DME device ordered - diagnosis sacral decubitus ulcer   quadriplegic from transverse myelitis  DME order- per IM provider  Claire Rubin requires a Roho cushion for her chair due to sacral decubitus   ulcers, quadriplegia from transverse myelitis   discharge summary complete paraplegia   patient is parapleg for nursing flowsheets ic bedbound at baseline    Per nursing flowsheets patient is at maximum assistance using max  Problematic for transfers pillow and support turn every 2 hours per nursing   maximum assistance for personal hygiene  RUE deformity-LUE weakness-RLE contracture flaccid LLE swelling unsteady with   foot drop  Right hand  weak-left hand  moderate left foot flexion weak right foot   flexion contracture full sensation BLE flaccid L LE motor response response   to command can move slightly  Options provided:  -- Quadriplegia confirmed present on admission  -- Paraplegia confirmed present on admission quadriplegia ruled out  -- Defer to consultant documentation regarding quadriplegia  -- Other - I will add my own diagnosis  -- Disagree - Not applicable / Not valid  -- Disagree - Clinically unable to determine / Unknown  -- Refer to Clinical Documentation Reviewer    PROVIDER RESPONSE TEXT:    The diagnosis of

## 2025-08-27 ENCOUNTER — TELEPHONE (OUTPATIENT)
Dept: PRIMARY CARE CLINIC | Age: 70
End: 2025-08-27

## 2025-08-29 ENCOUNTER — TELEPHONE (OUTPATIENT)
Dept: PRIMARY CARE CLINIC | Age: 70
End: 2025-08-29